# Patient Record
Sex: FEMALE | Race: WHITE | NOT HISPANIC OR LATINO | Employment: OTHER | URBAN - METROPOLITAN AREA
[De-identification: names, ages, dates, MRNs, and addresses within clinical notes are randomized per-mention and may not be internally consistent; named-entity substitution may affect disease eponyms.]

---

## 2017-02-21 LAB — HCV AB SER-ACNC: NEGATIVE

## 2018-02-21 ENCOUNTER — OFFICE VISIT (OUTPATIENT)
Dept: FAMILY MEDICINE CLINIC | Facility: CLINIC | Age: 63
End: 2018-02-21
Payer: MEDICARE

## 2018-02-21 VITALS
RESPIRATION RATE: 14 BRPM | SYSTOLIC BLOOD PRESSURE: 112 MMHG | TEMPERATURE: 98 F | HEIGHT: 61 IN | HEART RATE: 80 BPM | WEIGHT: 145.8 LBS | DIASTOLIC BLOOD PRESSURE: 76 MMHG | BODY MASS INDEX: 27.53 KG/M2

## 2018-02-21 DIAGNOSIS — Z12.39 SCREENING FOR BREAST CANCER: ICD-10-CM

## 2018-02-21 DIAGNOSIS — M51.36 DDD (DEGENERATIVE DISC DISEASE), LUMBAR: ICD-10-CM

## 2018-02-21 DIAGNOSIS — E53.8 B12 DEFICIENCY: Primary | ICD-10-CM

## 2018-02-21 DIAGNOSIS — I10 BENIGN ESSENTIAL HYPERTENSION: ICD-10-CM

## 2018-02-21 DIAGNOSIS — M48.02 CERVICAL STENOSIS OF SPINAL CANAL: ICD-10-CM

## 2018-02-21 DIAGNOSIS — D51.0 PERNICIOUS ANEMIA: ICD-10-CM

## 2018-02-21 DIAGNOSIS — E03.9 HYPOTHYROIDISM, UNSPECIFIED TYPE: ICD-10-CM

## 2018-02-21 DIAGNOSIS — J30.9 ALLERGIC RHINITIS, UNSPECIFIED CHRONICITY, UNSPECIFIED SEASONALITY, UNSPECIFIED TRIGGER: ICD-10-CM

## 2018-02-21 DIAGNOSIS — M51.9 DISC DISORDER OF LUMBAR REGION: ICD-10-CM

## 2018-02-21 PROCEDURE — 96372 THER/PROPH/DIAG INJ SC/IM: CPT | Performed by: FAMILY MEDICINE

## 2018-02-21 PROCEDURE — 99203 OFFICE O/P NEW LOW 30 MIN: CPT | Performed by: FAMILY MEDICINE

## 2018-02-21 RX ORDER — CLONAZEPAM 0.5 MG/1
TABLET ORAL
Refills: 2 | COMMUNITY
Start: 2018-02-12 | End: 2018-04-11 | Stop reason: SDUPTHER

## 2018-02-21 RX ORDER — TIZANIDINE 4 MG/1
TABLET ORAL
COMMUNITY
End: 2018-02-21 | Stop reason: SDUPTHER

## 2018-02-21 RX ORDER — SERTRALINE HYDROCHLORIDE 100 MG/1
200 TABLET, FILM COATED ORAL DAILY
COMMUNITY
End: 2018-08-21 | Stop reason: SDUPTHER

## 2018-02-21 RX ORDER — FLUTICASONE PROPIONATE 50 MCG
2 SPRAY, SUSPENSION (ML) NASAL DAILY
Qty: 16 G | Refills: 5 | Status: SHIPPED | OUTPATIENT
Start: 2018-02-21 | End: 2018-11-13 | Stop reason: SDUPTHER

## 2018-02-21 RX ORDER — LEVOTHYROXINE SODIUM 0.12 MG/1
1 TABLET ORAL DAILY
COMMUNITY
Start: 2013-04-23 | End: 2018-03-01 | Stop reason: SDUPTHER

## 2018-02-21 RX ORDER — DICYCLOMINE HYDROCHLORIDE 10 MG/1
CAPSULE ORAL
COMMUNITY
End: 2018-04-11 | Stop reason: SDUPTHER

## 2018-02-21 RX ORDER — CYANOCOBALAMIN 1000 UG/ML
1000 INJECTION INTRAMUSCULAR; SUBCUTANEOUS
Status: SHIPPED | OUTPATIENT
Start: 2018-02-21

## 2018-02-21 RX ORDER — OMEPRAZOLE 40 MG/1
40 CAPSULE, DELAYED RELEASE ORAL DAILY
Refills: 3 | COMMUNITY
Start: 2018-01-31 | End: 2018-04-11 | Stop reason: SDUPTHER

## 2018-02-21 RX ORDER — MONTELUKAST SODIUM 10 MG/1
TABLET ORAL
COMMUNITY
End: 2018-04-11 | Stop reason: ALTCHOICE

## 2018-02-21 RX ORDER — TIZANIDINE HYDROCHLORIDE 4 MG/1
CAPSULE, GELATIN COATED ORAL
Refills: 1 | COMMUNITY
Start: 2018-01-07 | End: 2018-10-10 | Stop reason: SDUPTHER

## 2018-02-21 RX ORDER — METAXALONE 800 MG/1
TABLET ORAL
COMMUNITY
End: 2018-10-10 | Stop reason: SDUPTHER

## 2018-02-21 RX ORDER — GABAPENTIN 600 MG/1
TABLET ORAL
COMMUNITY
End: 2018-07-11

## 2018-02-21 RX ORDER — AMLODIPINE BESYLATE 5 MG/1
5 TABLET ORAL DAILY
Refills: 3 | COMMUNITY
Start: 2018-01-17 | End: 2019-03-19 | Stop reason: SDUPTHER

## 2018-02-21 RX ORDER — FLUTICASONE PROPIONATE 50 MCG
SPRAY, SUSPENSION (ML) NASAL
COMMUNITY
End: 2018-02-21 | Stop reason: SDUPTHER

## 2018-02-21 RX ORDER — HYDROCODONE BITARTRATE AND ACETAMINOPHEN 5; 300 MG/1; MG/1
TABLET ORAL
COMMUNITY
End: 2018-02-21 | Stop reason: ALTCHOICE

## 2018-02-21 RX ORDER — PNV NO.95/FERROUS FUM/FOLIC AC 28MG-0.8MG
TABLET ORAL
COMMUNITY
End: 2018-07-11

## 2018-02-21 RX ORDER — LIDOCAINE 50 MG/G
PATCH TOPICAL
COMMUNITY
End: 2019-03-19 | Stop reason: SDUPTHER

## 2018-02-21 RX ORDER — SIMVASTATIN 20 MG
TABLET ORAL
COMMUNITY
End: 2018-04-11 | Stop reason: ALTCHOICE

## 2018-02-21 RX ORDER — OMEGA-3-ACID ETHYL ESTERS 1 G/1
2 CAPSULE, LIQUID FILLED ORAL DAILY
COMMUNITY
End: 2022-03-31

## 2018-02-21 RX ORDER — NEBIVOLOL 10 MG/1
20 TABLET ORAL DAILY
COMMUNITY
End: 2021-05-03 | Stop reason: SDUPTHER

## 2018-02-21 RX ORDER — HYDROCODONE BITARTRATE AND ACETAMINOPHEN 5; 325 MG/1; MG/1
1 TABLET ORAL EVERY 6 HOURS PRN
Qty: 30 TABLET | Refills: 0 | Status: SHIPPED | OUTPATIENT
Start: 2018-02-21 | End: 2018-04-11 | Stop reason: SDUPTHER

## 2018-02-21 RX ORDER — ATORVASTATIN CALCIUM 40 MG/1
40 TABLET, FILM COATED ORAL 3 TIMES WEEKLY
Refills: 3 | COMMUNITY
Start: 2018-01-31 | End: 2019-09-19 | Stop reason: SDUPTHER

## 2018-02-21 RX ADMIN — CYANOCOBALAMIN 1000 MCG: 1000 INJECTION INTRAMUSCULAR; SUBCUTANEOUS at 15:34

## 2018-02-21 NOTE — PROGRESS NOTES
FAMILY PRACTICE OFFICE VISIT       NAME: Blas Sibley  AGE: 60 y o  SEX: female       : 1955        MRN: [id number]    DATE: 2018  TIME: 9:54 PM    Assessment and Plan     Problem List Items Addressed This Visit     Benign essential hypertension    Hypothyroidism    Pernicious anemia    Cervical stenosis of spinal canal    Disc disorder of lumbar region      Other Visit Diagnoses     B12 deficiency    -  Primary    Relevant Medications    cyanocobalamin injection 1,000 mcg    DDD (degenerative disc disease), lumbar        Relevant Medications    HYDROcodone-acetaminophen (NORCO) 5-325 mg per tablet    Allergic rhinitis, unspecified chronicity, unspecified seasonality, unspecified trigger        Relevant Medications    fluticasone (FLONASE) 50 mcg/act nasal spray    Screening for breast cancer        Relevant Orders    Mammo screening bilateral w 3d & cad       Patient presents for initial exam   Will proceed with routine blood work  Prescription for 30 tablets of hydrocodone that patient uses sparingly as needed going to [place] pain med check is performed  Patient will continue daily medications for chronic medical conditions  Referral for mammography  She is up-to-date with colonoscopy  Will review bone density screening at next office visit  She is grieving death of significant other, symptoms of depression and anxiety are overall well controlled, she is surrounded by supportive family and friends and will contact me if her symptoms are changing at any time  Will schedule follow-up in 4 weeks with blood work in the interim  There are no Patient Instructions on file for this visit  Chief Complaint     Chief Complaint   Patient presents with    Annual Exam     Patient is here for an initial office visit  History of Present Illness     Patient presents to establish with our practice  Complex past medical and past surgical history reviewed  Medications updated    History of PSVT, she is under routine care of Cardiology, no chest pain, palpitations or dizziness  Recent appointment few weeks ago, lipid panel performed by Cardiology reviewed, excellent control, patient uses Lipitor 40 milligrams 3 days a week  She remains on Norvasc for hypertension  Patient is due for routine blood work   History of depression and anxiety  OnZoloft  200 mg po qd and Clonopin, chronic regimen, medications work well  Patient has been under ongoing stress within past 2 weeks as she has lost significant other due to sudden  heart attack  She is coping well, great family and friend support  She is mother of 2 adult sons, grandmother  chronic LBP, chronic neck pain,   DJD-  No connective tissue desease, patient is under care of [facility]  She is also suffering from chronic hip pain due to dysplasia  She uses scale ox in daytime as needed for muscle spasms, she uses tizanidine at nighttime she never max is both muscle relaxants together  She is requesting short-term prescription for Vicodin, she uses pain medications infrequently as needed only  [facility] search web site checked, patient's refills are appropriate, last prescription for oxycodone was prescribed bio a for 30 tablets and no refills  Patient states that Vicodin works better with less sedating side effects  Known  Osteoporosis - could tolerate oral Rx no recent bone density scan   Due for GYN exam a mammography    [facility] - DR Bouchra Cerda   History of pernicious anemia, patient is on vitamin B12 injections on a monthly basis  She remains on iron supplements as well  She is up-to-date with colonoscopy          Review of Systems   Review of Systems   Constitutional: Positive for fatigue  HENT: Negative  Eyes: Negative  Respiratory: Negative  Cardiovascular: Negative  Gastrointestinal: Negative  Endocrine: Negative  Genitourinary: Negative      Musculoskeletal: Positive for arthralgias, back pain, myalgias and neck pain  Allergic/Immunologic: Positive for environmental allergies  Neurological: Negative  Hematological: Negative  Psychiatric/Behavioral: Positive for dysphoric mood  The patient is nervous/anxious  Active Problem List     Patient Active Problem List   Diagnosis    Benign essential hypertension    Hypothyroidism    Lumbosacral radiculitis    Pernicious anemia    Cervical stenosis of spinal canal    Disc disorder of lumbar region       Past Medical History:  No past medical history on file  Past Surgical History:  No past surgical history on file  Family History:  No family history on file  Social History:  Social History     Social History    Marital status: /Civil Union     Spouse name: N/A    Number of children: N/A    Years of education: N/A     Occupational History    Not on file  Social History Main Topics    Smoking status: Never Smoker    Smokeless tobacco: Never Used    Alcohol use No    Drug use: No    Sexual activity: Not on file     Other Topics Concern    Not on file     Social History Narrative    No narrative on file     I have reviewed the patient's medical history in detail; there are no changes to the history as noted in the electronic medical record  Objective     Vitals:    02/21/18 0919   BP: 112/76   Pulse: 80   Resp: 14   Temp: 98 °F (36 7 °C)     Wt Readings from Last 3 Encounters:   02/21/18 66 1 kg (145 lb 12 8 oz)   10/22/13 63 kg (139 lb)       Physical Exam   Constitutional: She is oriented to person, place, and time  She appears well-developed and well-nourished  HENT:   Head: Normocephalic and atraumatic  Eyes: Conjunctivae are normal    Neck: Neck supple  Cardiovascular: Normal rate, regular rhythm and normal heart sounds  No murmur heard  Neurological: She is alert and oriented to person, place, and time  Psychiatric: She has a normal mood and affect   Judgment and thought content normal  Nursing note and vitals reviewed  Pertinent Laboratory/Diagnostic Studies:  No results found for: GLUCOSE, BUN, CREATININE, CALCIUM, NA, K, CO2, CL  No results found for: ALT, AST, GGT, ALKPHOS, BILITOT    No results found for: WBC, HGB, HCT, MCV, PLT    No results found for: TSH    No results found for: CHOL  No results found for: TRIG  No results found for: HDL  No results found for: LDLCALC  No results found for: HGBA1C    No results found for this or any previous visit      Orders Placed This Encounter   Procedures    Mammo screening bilateral w 3d & cad       ALLERGIES:  Allergies   Allergen Reactions    Anesthesia S-I-40  [Propofol]     Celecoxib     Cephalexin     Choline Magnesium Trisalicylate     Diclofenac     Fentanyl     Other     Sulfa Antibiotics     Zolpidem        Current Medications     Current Outpatient Prescriptions   Medication Sig Dispense Refill    amLODIPine (NORVASC) 5 mg tablet Take 5 mg by mouth daily  3    atorvastatin (LIPITOR) 40 mg tablet Take 40 mg by mouth every evening  3    Cholecalciferol ([employer]S VITAMIN D3) 1000 units capsule Take by mouth      clonazePAM (KlonoPIN) 0 5 mg tablet TAKE 1 TO 2 TABLETS EVERY EVENING AS NEEDED FOR ANXIETY  2    dicyclomine (BENTYL) 10 mg capsule Take by mouth      Ferrous Sulfate (IRON) 325 (65 Fe) MG TABS Take by mouth      fluticasone (FLONASE) 50 mcg/act nasal spray 2 sprays into each nostril daily 16 g 5    levothyroxine 125 mcg tablet Take 1 tablet by mouth daily      lidocaine (LIDODERM) 5 % Apply topically      metaxalone (SKELAXIN) 800 mg tablet Take by mouth      nebivolol (BYSTOLIC) 10 mg tablet Take by mouth      omega-3-acid ethyl esters (LOVAZA) 1 g capsule Take 2 g by mouth daily      omeprazole (PriLOSEC) 40 MG capsule Take 40 mg by mouth daily  3    sertraline (ZOLOFT) 100 mg tablet Take by mouth      TiZANidine (ZANAFLEX) 4 MG capsule TAKE ONE CAPSULE BY MOUTH 3 TIMES A DAY AS NEEDED FOR MUSCLE SPASMS  1  gabapentin (NEURONTIN) 600 MG tablet Take by mouth      HYDROcodone-acetaminophen (NORCO) 5-325 mg per tablet Take 1 tablet by mouth every 6 (six) hours as needed for pain (severe back/ neck  pain) Max Daily Amount: 4 tablets 30 tablet 0    montelukast (SINGULAIR) 10 mg tablet Take by mouth      simvastatin (ZOCOR) 20 mg tablet Take by mouth       Current Facility-Administered Medications   Medication Dose Route Frequency Provider Last Rate Last Dose    cyanocobalamin injection 1,000 mcg  1,000 mcg Intramuscular Q30 Days Dimas Peraza MD   1,000 mcg at 02/21/18 1534         [facility] [place]   There are no preventive care reminders to display for this patient  There is no immunization history on file for this patient      Dimas Peraza MD

## 2018-03-01 DIAGNOSIS — E03.9 HYPOTHYROIDISM, UNSPECIFIED TYPE: Primary | ICD-10-CM

## 2018-03-01 RX ORDER — LEVOTHYROXINE SODIUM 0.12 MG/1
125 TABLET ORAL DAILY
Qty: 30 TABLET | Refills: 3 | Status: SHIPPED | OUTPATIENT
Start: 2018-03-01 | End: 2018-06-09 | Stop reason: SDUPTHER

## 2018-03-28 ENCOUNTER — CLINICAL SUPPORT (OUTPATIENT)
Dept: FAMILY MEDICINE CLINIC | Facility: CLINIC | Age: 63
End: 2018-03-28
Payer: MEDICARE

## 2018-03-28 DIAGNOSIS — E53.8 B12 DEFICIENCY: Primary | ICD-10-CM

## 2018-03-28 PROCEDURE — 96372 THER/PROPH/DIAG INJ SC/IM: CPT | Performed by: FAMILY MEDICINE

## 2018-03-28 RX ORDER — CYANOCOBALAMIN 1000 UG/ML
1000 INJECTION INTRAMUSCULAR; SUBCUTANEOUS
Status: SHIPPED | OUTPATIENT
Start: 2018-03-28

## 2018-03-29 RX ADMIN — CYANOCOBALAMIN 1000 MCG: 1000 INJECTION INTRAMUSCULAR; SUBCUTANEOUS at 07:17

## 2018-04-11 ENCOUNTER — OFFICE VISIT (OUTPATIENT)
Dept: FAMILY MEDICINE CLINIC | Facility: CLINIC | Age: 63
End: 2018-04-11
Payer: MEDICARE

## 2018-04-11 VITALS
HEIGHT: 60 IN | BODY MASS INDEX: 28.27 KG/M2 | WEIGHT: 144 LBS | TEMPERATURE: 97.1 F | SYSTOLIC BLOOD PRESSURE: 110 MMHG | RESPIRATION RATE: 16 BRPM | DIASTOLIC BLOOD PRESSURE: 60 MMHG | HEART RATE: 74 BPM

## 2018-04-11 DIAGNOSIS — M48.02 CERVICAL STENOSIS OF SPINAL CANAL: ICD-10-CM

## 2018-04-11 DIAGNOSIS — Z12.11 SCREENING FOR COLON CANCER: ICD-10-CM

## 2018-04-11 DIAGNOSIS — K21.9 GASTROESOPHAGEAL REFLUX DISEASE WITHOUT ESOPHAGITIS: ICD-10-CM

## 2018-04-11 DIAGNOSIS — E03.9 HYPOTHYROIDISM, UNSPECIFIED TYPE: Primary | ICD-10-CM

## 2018-04-11 DIAGNOSIS — M51.36 DDD (DEGENERATIVE DISC DISEASE), LUMBAR: ICD-10-CM

## 2018-04-11 DIAGNOSIS — M81.0 OSTEOPOROSIS, UNSPECIFIED OSTEOPOROSIS TYPE, UNSPECIFIED PATHOLOGICAL FRACTURE PRESENCE: ICD-10-CM

## 2018-04-11 DIAGNOSIS — F41.9 ANXIETY: ICD-10-CM

## 2018-04-11 DIAGNOSIS — D51.0 PERNICIOUS ANEMIA: ICD-10-CM

## 2018-04-11 DIAGNOSIS — K58.0 IRRITABLE BOWEL SYNDROME WITH DIARRHEA: ICD-10-CM

## 2018-04-11 DIAGNOSIS — I10 BENIGN ESSENTIAL HYPERTENSION: ICD-10-CM

## 2018-04-11 DIAGNOSIS — Z00.00 HEALTHCARE MAINTENANCE: ICD-10-CM

## 2018-04-11 DIAGNOSIS — D50.8 IRON DEFICIENCY ANEMIA SECONDARY TO INADEQUATE DIETARY IRON INTAKE: ICD-10-CM

## 2018-04-11 PROBLEM — M81.8 OTHER OSTEOPOROSIS WITHOUT CURRENT PATHOLOGICAL FRACTURE: Status: ACTIVE | Noted: 2018-04-11

## 2018-04-11 PROCEDURE — 99214 OFFICE O/P EST MOD 30 MIN: CPT | Performed by: NURSE PRACTITIONER

## 2018-04-11 PROCEDURE — G0438 PPPS, INITIAL VISIT: HCPCS | Performed by: NURSE PRACTITIONER

## 2018-04-11 RX ORDER — HYDROCODONE BITARTRATE AND ACETAMINOPHEN 5; 325 MG/1; MG/1
1 TABLET ORAL EVERY 6 HOURS PRN
Qty: 30 TABLET | Refills: 0 | Status: SHIPPED | OUTPATIENT
Start: 2018-04-11 | End: 2018-06-04 | Stop reason: SDUPTHER

## 2018-04-11 RX ORDER — OMEPRAZOLE 40 MG/1
40 CAPSULE, DELAYED RELEASE ORAL DAILY
Qty: 30 CAPSULE | Refills: 5 | Status: SHIPPED | OUTPATIENT
Start: 2018-04-11 | End: 2018-07-11

## 2018-04-11 RX ORDER — DICYCLOMINE HYDROCHLORIDE 10 MG/1
10 CAPSULE ORAL
Qty: 120 CAPSULE | Refills: 5 | Status: SHIPPED | OUTPATIENT
Start: 2018-04-11 | End: 2018-10-13 | Stop reason: SDUPTHER

## 2018-04-11 RX ORDER — CLONAZEPAM 0.5 MG/1
0.5 TABLET ORAL
Qty: 30 TABLET | Refills: 2 | Status: SHIPPED | OUTPATIENT
Start: 2018-04-11 | End: 2018-05-25

## 2018-04-11 NOTE — PROGRESS NOTES
FAMILY PRACTICE OFFICE VISIT       NAME: Maulik June  AGE: 58 y o  SEX: female       : 1955        MRN: [id number]    DATE: 2018    Assessment and Plan     Problem List Items Addressed This Visit     Benign essential hypertension    Hypothyroidism - Primary    Pernicious anemia    Cervical stenosis of spinal canal    Gastroesophageal reflux disease without esophagitis    Relevant Medications    dicyclomine (BENTYL) 10 mg capsule    omeprazole (PriLOSEC) 40 MG capsule    Iron deficiency anemia secondary to inadequate dietary iron intake    Irritable bowel syndrome with diarrhea    Relevant Medications    dicyclomine (BENTYL) 10 mg capsule      Other Visit Diagnoses     DDD (degenerative disc disease), lumbar        Relevant Medications    HYDROcodone-acetaminophen (NORCO) 5-325 mg per tablet    Osteoporosis, unspecified osteoporosis type, unspecified pathological fracture presence        Relevant Orders    DXA bone density spine hip and pelvis    Anxiety        Relevant Medications    clonazePAM (KlonoPIN) 0 5 mg tablet    Screening for colon cancer        Relevant Orders    Ambulatory referral to Gastroenterology    Healthcare maintenance            1  Hypothyroidism, unspecified type  Levothyroxine 125 mcg daily  Due for TSH, has labs slips and will complete blood work as requested by Dr Urvashi Sauceda at last visit  2  Benign essential hypertension  Stable on Amlodipine 5 mg daily and Bystolic 10 mg daily  3  Pernicious anemia  Vitamin B12 injections monthly  Due for CBC  Has a slip, ordered by Dr Urvashi Sauceda at last visit  She will complete  4  Iron deficiency anemia secondary to inadequate dietary iron intake  Ferrous Sulfate 325 mg daily  Due for CBC, has order and she will complete  5  Cervical stenosis of spinal canal  Follows with Dr Bobbi Spurling at [place] [id number] and chiropractor, Dr Kiah Roach at [facility]  Stable on Gabapentin and Tizanidine 4 mg at bedtime daily   Skelaxin as needed during the day  She is aware not to take both of these medications at the same time  Requests a refill of Vicodin today, which she uses sparingly  [place] PDMP queried and appropriate  Prescription provided  Has used more vicodin over the last week than usual, due to an increase in left shoulder and arm pain  Chiropractic care is also helping with recent pain increase  6  DDD (degenerative disc disease), lumbar  See above  - HYDROcodone-acetaminophen (NORCO) 5-325 mg per tablet; Take 1 tablet by mouth every 6 (six) hours as needed for pain (severe back/ neck  pain) Max Daily Amount: 4 tablets  Dispense: 30 tablet; Refill: 0    7  Osteoporosis, unspecified osteoporosis type, unspecified pathological fracture presence  States she has a history of osteoporosis, ? Osteoporosis or osteopenia, will check Dexa scan  - DXA bone density spine hip and pelvis; Future    8  Irritable bowel syndrome with diarrhea  Stable on Dicyclomine  [facility] was [facility] with [facility], but has not seen him for quite some time, due to the stress of her 's illness and death 2 years ago  Renewed Dicylcomine today and have recommended she resume care with Dr Erinn Morton  - dicyclomine (BENTYL) 10 mg capsule; Take 1 capsule (10 mg total) by mouth 4 (four) times a day (before meals and at bedtime)  Dispense: 120 capsule; Refill: 5    9  Gastroesophageal reflux disease without esophagitis  Stable on Omeprazole  She has been taking this for many years  We discussed trial of weaning off this medication, due to long term side effects  She does however, sometimes feel regurgitation when bending over  Recommend she re-establish care with Dr Erinn Morton  - omeprazole (PriLOSEC) 40 MG capsule; Take 1 capsule (40 mg total) by mouth daily  Dispense: 30 capsule; Refill: 5    10  Anxiety  Stable on Sertraline 100 mg daily and Clonazepam 0 5 mg at bedtime  Clonazepam refilled today  [place] quer[place] and appropriate       - clonazePAM (KlonoPIN) 0 5 mg tablet; Take 1 tablet (0 5 mg total) by mouth daily at bedtime  Dispense: 30 tablet; Refill: 2    11  Screening for colon cancer  Up to date per patient  Will attempt to obtain copy of last colonoscopy  - Ambulatory referral to Gastroenterology; Future    12  Healthcare maintenance  Up to date with annual eye exam, Dr Lacho Santana  Colonoscopy up to date, Dr Willy Mckinney, [facility]  Mammogram and annual gyn exam due, she follows with Kentrell Willson CNM  Dexa-states she has a history of osteoporosis, but has not had a dexa scan for several years  Hepatitis C screening completed and negative  Zostavax completed at age 60  Complete blood work and follow up with Dr Hector Metzger in 3 months  Chief Complaint     Chief Complaint   Patient presents with    Follow-up    Medicare Wellness Visit     initial       History of Present Illness     Patient presents today for follow up visit    History of low back pain with lumbar DDD and neck pain due to cervical stenosis  She follows with chiropractor, Dr Jose Barfield at [facility] and Dr Melissa Tamayo at [facility]  She has been having increased left arm and shoulder pain over the last week and has been seeing Dr Jose Barfield weekly  She takes Tizanidine on a daily basis at bedtime  Uses Skelaxin on occasion during the day if needed  She is aware she cannot take both at the same time  Using Vicodin on an as needed basis sparingly  Requests refill of vicodin today  States she has about 4-6 pills left, but has been taking them more frequently over the past few days due to increased pain  She has a spinal cord stimulator in place that is not functioning  She does not wish to have it removed  Currently grieving  Lost her  2 years ago and has lost her significant other about 2 months ago  Anxiety and depression well controlled with Sertraline 100 mg daily, and Clonazepam 0 5 mg at bedtime        Health Maintenance:   Up to date with annual eye exam, Dr Nellie Min  Colonoscopy up to date, Dr Mireya Conrad, [facility]  Mammogram and annual gyn exam due, she follows with Areli Campos CNM  Dexa-states she has a history of osteoporosis, but has not had a dexa scan for several years  Hepatitis C screening negative  Zostavax completed at age 60  Seasonal allergic rhinitis well controlled with Flonase daily during spring and fall  Uses an OTC antihistamine as needed  Follows with cardiology for PSVT, HTN, and Hyperlipidemia, Dr Judah Reyes  IBS-D, taking Dicyclomine 2-3 times per day  GERD, stable with Prilosec, has taken Prilosec for many years  MITCH and pernicious anemia monthly B12 injections and Ferrous sulfate 325 mg daily  Review of Systems   Review of Systems   Constitutional: Negative for chills, fatigue, fever and unexpected weight change  HENT: Negative  Eyes: Negative  Respiratory: Negative for cough and shortness of breath  Cardiovascular: Negative for chest pain, palpitations and leg swelling  Gastrointestinal: Positive for diarrhea  Negative for abdominal pain, nausea and vomiting  Endocrine: Negative  Genitourinary: Negative for difficulty urinating  Musculoskeletal: Positive for arthralgias and back pain  Skin: Negative for rash  Neurological: Negative for dizziness, weakness and headaches  Hematological: Negative for adenopathy  Does not bruise/bleed easily  Psychiatric/Behavioral: Negative for suicidal ideas  The patient is nervous/anxious          Active Problem List     Patient Active Problem List   Diagnosis    Benign essential hypertension    Hypothyroidism    Lumbosacral radiculitis    Pernicious anemia    Cervical stenosis of spinal canal    Disc disorder of lumbar region    Other osteoporosis without current pathological fracture    Gastroesophageal reflux disease without esophagitis    Seasonal allergic rhinitis due to pollen    PSVT (paroxysmal supraventricular tachycardia) (HCC)    Iron deficiency anemia secondary to inadequate dietary iron intake    Irritable bowel syndrome with diarrhea       Past Medical History:  Past Medical History:   Diagnosis Date    Anxiety and depression     Bleeding ulcer     GERD (gastroesophageal reflux disease)     Spinal meningioma ([address] )        Past Surgical History:  Past Surgical History:   Procedure Laterality Date    SPINAL CORD STIMULATOR IMPLANT      SPINE SURGERY         Family History:  No family history on file  Social History:  Social History     Social History    Marital status:      Spouse name: N/A    Number of children: N/A    Years of education: N/A     Occupational History    Not on file  Social History Main Topics    Smoking status: Never Smoker    Smokeless tobacco: Never Used    Alcohol use No    Drug use: No    Sexual activity: Not on file     Other Topics Concern    Not on file     Social History Narrative        Retired    2 adult sons    Grandchildren       I have reviewed the patient's medical history in detail; there are no changes to the history as noted in the electronic medical record  Objective     Vitals:    04/11/18 1020   BP: 110/60   BP Location: Left leg   Patient Position: Sitting   Cuff Size: Standard   Pulse: 74   Resp: 16   Temp: (!) 97 1 °F (36 2 °C)   TempSrc: Tympanic   Weight: 65 3 kg (144 lb)   Height: 5' (1 524 m)     Wt Readings from Last 3 Encounters:   04/11/18 65 3 kg (144 lb)   02/21/18 66 1 kg (145 lb 12 8 oz)   10/22/13 63 kg (139 lb)       Physical Exam   Constitutional: She is oriented to person, place, and time  She appears well-developed and well-nourished  No distress  HENT:   Head: Normocephalic and atraumatic     Right Ear: Tympanic membrane and ear canal normal    Left Ear: Tympanic membrane and ear canal normal    Nose: Nose normal    Mouth/Throat: Oropharynx is clear and moist    Eyes: Conjunctivae and EOM are normal  Pupils are equal, round, and reactive to light  Neck: Normal range of motion  Neck supple  No thyromegaly present  No carotid bruit auscultated bilaterally   Cardiovascular: Normal rate and regular rhythm  No murmur heard  Pulmonary/Chest: Effort normal and breath sounds normal    Abdominal: Soft  Bowel sounds are normal  There is no tenderness  Musculoskeletal: Normal range of motion  She exhibits no edema  Lymphadenopathy:     She has no cervical adenopathy  Neurological: She is alert and oriented to person, place, and time  Skin: No rash noted  Psychiatric: She has a normal mood and affect  Nursing note and vitals reviewed          Orders Placed This Encounter   Procedures    DXA bone density spine hip and pelvis    Ambulatory referral to Gastroenterology       ALLERGIES:  Allergies   Allergen Reactions    Anesthesia S-I-40  [Propofol]     Celecoxib     Cephalexin     Choline Magnesium Trisalicylate     Diclofenac     Fentanyl     Other     Sulfa Antibiotics     Zolpidem        Current Medications     Current Outpatient Prescriptions   Medication Sig Dispense Refill    amLODIPine (NORVASC) 5 mg tablet Take 5 mg by mouth daily  3    atorvastatin (LIPITOR) 40 mg tablet Take 40 mg by mouth every evening  3    Calcium Carbonate-Vitamin D (CALCIUM CREAMIES PO) Take 600 mg by mouth daily      Cholecalciferol (CVS VITAMIN D3) 1000 units capsule Take by mouth      clonazePAM (KlonoPIN) 0 5 mg tablet Take 1 tablet (0 5 mg total) by mouth daily at bedtime 30 tablet 2    dicyclomine (BENTYL) 10 mg capsule Take 1 capsule (10 mg total) by mouth 4 (four) times a day (before meals and at bedtime) 120 capsule 5    Ferrous Sulfate (IRON) 325 (65 Fe) MG TABS Take by mouth      fluticasone (FLONASE) 50 mcg/act nasal spray 2 sprays into each nostril daily 16 g 5    HYDROcodone-acetaminophen (NORCO) 5-325 mg per tablet Take 1 tablet by mouth every 6 (six) hours as needed for pain (severe back/ neck  pain) Max Daily Amount: 4 tablets 30 tablet 0    levothyroxine 125 mcg tablet Take 1 tablet (125 mcg total) by mouth daily 30 tablet 3    lidocaine (LIDODERM) 5 % Apply topically      metaxalone (SKELAXIN) 800 mg tablet Take by mouth      nebivolol (BYSTOLIC) 10 mg tablet Take by mouth      omega-3-acid ethyl esters (LOVAZA) 1 g capsule Take 2 g by mouth daily      omeprazole (PriLOSEC) 40 MG capsule Take 1 capsule (40 mg total) by mouth daily 30 capsule 5    sertraline (ZOLOFT) 100 mg tablet Take by mouth      TiZANidine (ZANAFLEX) 4 MG capsule TAKE ONE CAPSULE BY MOUTH 3 TIMES A DAY AS NEEDED FOR MUSCLE SPASMS  1    gabapentin (NEURONTIN) 600 MG tablet Take by mouth       Current Facility-Administered Medications   Medication Dose Route Frequency Provider Last Rate Last Dose    cyanocobalamin injection 1,000 mcg  1,000 mcg Intramuscular Q30 Days Trinity Galvin MD   1,000 mcg at 02/21/18 1534    cyanocobalamin injection 1,000 mcg  1,000 mcg Intramuscular Q30 Days Trinity Galvin MD   1,000 mcg at 03/29/18 0717         Health Maintenance     Health Maintenance   Topic Date Due    HIV SCREENING  1955    Hepatitis C Screening  1955    COLONOSCOPY  1955    Depression Screening PHQ-9  04/09/1966    DTaP,Tdap,and Td Vaccines (2 - Td) 02/17/2018    INFLUENZA VACCINE  Completed     Immunization History   Administered Date(s) Administered    Tdap 02/17/2008    Zoster 04/29/2014       LAKEISHA Camarillo

## 2018-04-11 NOTE — PROGRESS NOTES
HPI:  Viki España is a 58 y o  female here for her Initial Wellness Visit  Patient Active Problem List   Diagnosis    Benign essential hypertension    Hypothyroidism    Lumbosacral radiculitis    Pernicious anemia    Cervical stenosis of spinal canal    Disc disorder of lumbar region    Other osteoporosis without current pathological fracture    Gastroesophageal reflux disease without esophagitis    Seasonal allergic rhinitis due to pollen    PSVT (paroxysmal supraventricular tachycardia) (HCC)    Iron deficiency anemia secondary to inadequate dietary iron intake    Irritable bowel syndrome with diarrhea     Past Medical History:   Diagnosis Date    Anxiety and depression     Bleeding ulcer     GERD (gastroesophageal reflux disease)     Spinal meningioma ([address] )      Past Surgical History:   Procedure Laterality Date    SPINAL CORD STIMULATOR IMPLANT      SPINE SURGERY       No family history on file    History   Smoking Status    Never Smoker   Smokeless Tobacco    Never Used     History   Alcohol Use No      History   Drug Use No     /60 (BP Location: Left leg, Patient Position: Sitting, Cuff Size: Standard)   Pulse 74   Temp (!) 97 1 °F (36 2 °C) (Tympanic)   Resp 16   Ht 5' (1 524 m)   Wt 65 3 kg (144 lb)   BMI 28 12 kg/m²       Current Outpatient Prescriptions   Medication Sig Dispense Refill    amLODIPine (NORVASC) 5 mg tablet Take 5 mg by mouth daily  3    atorvastatin (LIPITOR) 40 mg tablet Take 40 mg by mouth 3 (three) times a week    3    Calcium Carbonate-Vitamin D (CALCIUM CREAMIES PO) Take 600 mg by mouth daily      Cholecalciferol ([facility] VITAMIN D3) 1000 units capsule Take by mouth      clonazePAM (KlonoPIN) 0 5 mg tablet Take 1 tablet (0 5 mg total) by mouth daily at bedtime 30 tablet 2    dicyclomine (BENTYL) 10 mg capsule Take 1 capsule (10 mg total) by mouth 4 (four) times a day (before meals and at bedtime) 120 capsule 5    Ferrous Sulfate (IRON) 325 (65 Fe) MG TABS Take by mouth      fluticasone (FLONASE) 50 mcg/act nasal spray 2 sprays into each nostril daily 16 g 5    HYDROcodone-acetaminophen (NORCO) 5-325 mg per tablet Take 1 tablet by mouth every 6 (six) hours as needed for pain (severe back/ neck  pain) Max Daily Amount: 4 tablets 30 tablet 0    levothyroxine 125 mcg tablet Take 1 tablet (125 mcg total) by mouth daily 30 tablet 3    lidocaine (LIDODERM) 5 % Apply topically      metaxalone (SKELAXIN) 800 mg tablet Take by mouth      nebivolol (BYSTOLIC) 10 mg tablet Take by mouth      omega-3-acid ethyl esters (LOVAZA) 1 g capsule Take 2 g by mouth daily      omeprazole (PriLOSEC) 40 MG capsule Take 1 capsule (40 mg total) by mouth daily 30 capsule 5    sertraline (ZOLOFT) 100 mg tablet Take by mouth      TiZANidine (ZANAFLEX) 4 MG capsule TAKE ONE CAPSULE BY MOUTH 3 TIMES A DAY AS NEEDED FOR MUSCLE SPASMS  1    gabapentin (NEURONTIN) 600 MG tablet Take by mouth       Current Facility-Administered Medications   Medication Dose Route Frequency Provider Last Rate Last Dose    cyanocobalamin injection 1,000 mcg  1,000 mcg Intramuscular Q30 Days Gregoria Perez MD   1,000 mcg at 02/21/18 1534    cyanocobalamin injection 1,000 mcg  1,000 mcg Intramuscular Q30 Days Gregoria Perez MD   1,000 mcg at 03/29/18 5991     Allergies   Allergen Reactions    Anesthesia S-I-40  [Propofol]     Celecoxib     Cephalexin     Choline Magnesium Trisalicylate     Diclofenac     Fentanyl     Other     Sulfa Antibiotics     Zolpidem      Immunization History   Administered Date(s) Administered    Tdap 02/17/2008    Zoster 04/29/2014       Patient Care Team:  Gregoria Perez MD as PCP - General (Family Medicine)    Medicare Screening Tests and Risk Assessments:  AWV Clinical     ISAR:   Previous hospitalizations?:  No       Once in a Lifetime Medicare Screening:   EKG performed?:  No    AAA screening performed? (if performed, please add date to Health Maintenance):  No       Medicare Screening Tests and Risk Assessment:   AAA Risk Assessment    None Indicated:  Yes    Osteoporosis Risk Assessment     Female:  Yes   :  Yes :  No   Age over 48:  Yes Low body weight (<127lbs):  No   Tobacco use:  No Alcohol use:  Yes   Low calcium diet:  No PMHX of fractures:  Yes   FHX of fractures:  Yes    HIV Risk Assessment    None indicated:  Yes        Drug and Alcohol Use:   Tobacco use    Cigarettes:  never smoker    Smokeless:  never used smokeless tobacco    Tobacco use duration    Tobacco Cessation Readiness    Alcohol use    Alcohol use:  rare use    Concern about alcohol use:  No    Alcohol Treatment Readiness   Illicit Drug Use    Drug use:  never    Drug type:  no sedative use       Diet & Exercise:   Diet   What is your diet?:  Low Saturated Fat, Low Carb, No Added Salt, Limited junk food   How many servings a day of the following:   Fruits and Vegetables:  3-4 Meat:  1-2   Whole Grains:  2 Simple Carbs:  2   Dairy:  1 Soda:  1   Coffee:  1 Tea:  1   Exercise    Do you currently exercise?:  yes    Frequency:  daily    Type of exercise:  walking   [facility]        Cognitive Impairment Screening:   Depression screening preformed:  Yes     PHQ-9 Depression scale score:  5   Depression screening results:  mild to moderate symptoms   Cognitive Impairment Screening    Do you have difficulty learning or retaining new information?:  No        Functional Ability/Level of Safety:   Hearing    Hearing difficulties:  Yes Bilateral:  slightly decreased   Hearing aid:  No    Hearing Impairment Assessment    Hearing status:  Hearing loss in left ear, Hearing loss in right ear   Current Activities    Status:  unlimited ADL's, unlimited IADL's, unlimited social activities, unlimited driving   Help needed with the folllowing:    Using the phone:  No Transportation:  No   Shopping:  No Preparing Meals:  No   Doing Housework:  No Doing Laundry:  No   Managing Medications:  No Managing Money:  No   ADL    Feeding:  Independant   Oral hygiene and Facial grooming:  Independant   Bathing:  Independant   Upper Body Dressing:  Independant   Lower Body Dressing:  Independant   Toileting:  Independant   Bed Mobility:  Additional time   Fall Risk   Have you fallen in the last 12 months?:  Yes Are you unsteady on your feet?:  Yes   How many times?:  2 Are you taking any medications that may cause fatigue or dizziness?:  Yes   Do you have any chronic conditions that may contribute to a fall?:  Neuopathy Do you rush to the bathroom potentially risking a fall?:  No   Injury History   Polypharmacy:  Yes Antidepressant Use:  Yes   Sedative Use:  No Antihypertensive Use:  Yes   Previous Fall:  Yes Alcohol Use:  Yes   Deconditioning:  No Visual Impairment:  No   Cogitive Impairment:  No Mmobility Impairment:  Yes   Postural Hypotension:  No Urinary Incontinence:  Yes       Home Safety:   Are there hazards in your environment?:  No   Home Safety Risk Factors   Unfamilar with surroundings:  No Uneven floors:  No   Stairs or handrail saftey risk:  No Loose rugs:  No   Household clutter:  No Poor household lighting:  No   No grab bars in bathroom:  No Further evaluation needed:  No       Advanced Directives:   Advanced Directives    Living Will:  No    Advanced directive:  No    Patient's End of Life Decisions    End of life decisions comments:  Papers given and explained       Urinary Incontinence:   Do you have urinary incontinence?:  Yes        Glaucoma:            Provider Screening     Preventative Screening/Counseling:   Cardiovascular Screening/Counseling:   (Labs Q5 years, EKG optional one-time)   General:  Screening Current Counseling:  Healthy Diet, Improve Exercise Tolerance Due for: Lab Panel/Analytes:  Lipid Panel         Diabetes Screening/Counseling:   (2 tests/year if Pre-Diabetes or 1 test/year if no Diabetes)   General:  Risks and Benefits Discussed Counseling:  Healthy Diet, Healthy Weight, Improve Physical Activity Due for: Labs:  Blood Glucose         Colorectal Cancer Screening/Counseling:   (FOBT Q1 yr; Flex Sig Q4 yrs or Q10 yrs after Screening Colonoscopy; Screening Colonoscpy Q2 yrs High Risk or Q10 yrs Low Risk; Barium Enema Q2 yrs High Risk or Q4 yrs Low Risk)   General:  Screening Current           Prostate Cancer Screening/Counseling:   (Annual)          Breast Cancer Screening/Counseling:   (Baseline Age 28 - 43; Annual Age 36+)   General:  Risks and Benefits Discussed Due for: Studies:  mammogram         Cervical Cancer Screening/Counseling:   (Annual for High Risk or Childbearing Age with Abnormal Pap in Last 3 yrs; Every 2 all others)   General:  Risks and Benefits Discussed Due for: Studies:  Cervical Pap Smear          Osteoporosis Screening/Counseling:   (Every 2 Yrs if at risk or more if medically necessary)   General:  Risks and Benefits Discussed  Due for: Studies:  DXA Axial         AAA Screening/Counseling:   (Once per Lifetime with risk factors)    General:  Screening Not Indicated           Glaucoma Screening/Counseling:   (Annual)   General:  Screening Current          HIV Screening/Counseling:   (Voluntary; Once annually for high risk OR 3 times for Pregnancy at diagnosis of IUP; 3rd trimester; and at Labor         Hepatitis C Screening:             Immunizations:   Zostavax (Medicare D Coverage, Pt >64 yo):  Zostavax Vaccine UTD       Other Preventative Couseling (Non-Medicare Wellness Visit Required):       Referrals (Non-Medicare Wellness Visit Required):       Medical Equipment/Suppliers:           Physical Exam :MMSE=30  Physical Exam   Constitutional: She is oriented to person, place, and time  She appears well-developed and well-nourished  No distress  HENT:   Head: Normocephalic and atraumatic     Right Ear: Tympanic membrane and ear canal normal    Left Ear: Tympanic membrane and ear canal normal    Nose: Nose normal    Mouth/Throat: Uvula is midline and oropharynx is clear and moist    Eyes: Conjunctivae and EOM are normal  Pupils are equal, round, and reactive to light  Neck: Normal range of motion  Neck supple  No thyromegaly present  Cardiovascular: Normal rate, regular rhythm and normal heart sounds  Pulmonary/Chest: Effort normal and breath sounds normal    Abdominal: Soft  Bowel sounds are normal  There is no tenderness  Musculoskeletal: She exhibits no edema  Lymphadenopathy:     She has no cervical adenopathy  Neurological: She is alert and oriented to person, place, and time  Skin: Skin is warm and dry  Capillary refill takes less than 2 seconds  No rash noted  Psychiatric: She has a normal mood and affect  Nursing note and vitals reviewed  Reviewed Updated [facility] Prior Wellness Visits:   Last Medicare wellness visit information was reviewed, patient interviewed , no change since last AWVno  Last Medicare wellness visit information was reviewed, patient interviewed and updates made to the record today no    Assessment and Plan:  1  Hypothyroidism, unspecified type     2  Benign essential hypertension     3  Pernicious anemia     4  Iron deficiency anemia secondary to inadequate dietary iron intake     5  Cervical stenosis of spinal canal     6  DDD (degenerative disc disease), lumbar  HYDROcodone-acetaminophen (NORCO) 5-325 mg per tablet   7  Osteoporosis, unspecified osteoporosis type, unspecified pathological fracture presence  DXA bone density spine hip and pelvis   8  Irritable bowel syndrome with diarrhea  dicyclomine (BENTYL) 10 mg capsule   9  Gastroesophageal reflux disease without esophagitis  omeprazole (PriLOSEC) 40 MG capsule   10  Anxiety  clonazePAM (KlonoPIN) 0 5 mg tablet   11  Screening for colon cancer  Ambulatory referral to Gastroenterology   12   Healthcare maintenance       Up to date with annual eye exam, Dr Bo Hardwick  Colonoscopy up to date, Dr Kadie Garnica, [facility]  Mammogram and annual gyn exam due, she follows with Andrew Nuñez CNM  Dexa-states she has a history of osteoporosis, but has not had a dexa scan for several years  Dexa ordered  Hepatitis C screening completed and negative  Zostavax completed at age 60         Health Maintenance Due   Topic Date Due    HIV SCREENING  1955    Hepatitis C Screening  1955    COLONOSCOPY  1955    Depression Screening PHQ-9  04/09/1966    DTaP,Tdap,and Td Vaccines (2 - Td) 02/17/2018

## 2018-04-12 PROBLEM — K58.0 IRRITABLE BOWEL SYNDROME WITH DIARRHEA: Status: ACTIVE | Noted: 2018-04-12

## 2018-04-12 PROBLEM — K21.9 GASTROESOPHAGEAL REFLUX DISEASE WITHOUT ESOPHAGITIS: Status: ACTIVE | Noted: 2018-04-12

## 2018-04-12 PROBLEM — I47.1 PSVT (PAROXYSMAL SUPRAVENTRICULAR TACHYCARDIA) (HCC): Status: ACTIVE | Noted: 2018-04-12

## 2018-04-12 PROBLEM — J30.1 SEASONAL ALLERGIC RHINITIS DUE TO POLLEN: Status: ACTIVE | Noted: 2018-04-12

## 2018-04-12 PROBLEM — D50.8 IRON DEFICIENCY ANEMIA SECONDARY TO INADEQUATE DIETARY IRON INTAKE: Status: ACTIVE | Noted: 2018-04-12

## 2018-04-12 PROBLEM — I47.10 PSVT (PAROXYSMAL SUPRAVENTRICULAR TACHYCARDIA): Status: ACTIVE | Noted: 2018-04-12

## 2018-04-30 ENCOUNTER — APPOINTMENT (OUTPATIENT)
Dept: LAB | Facility: CLINIC | Age: 63
End: 2018-04-30
Payer: MEDICARE

## 2018-04-30 ENCOUNTER — CLINICAL SUPPORT (OUTPATIENT)
Dept: FAMILY MEDICINE CLINIC | Facility: CLINIC | Age: 63
End: 2018-04-30
Payer: MEDICARE

## 2018-04-30 DIAGNOSIS — I10 BENIGN ESSENTIAL HYPERTENSION: ICD-10-CM

## 2018-04-30 DIAGNOSIS — M51.9 DISC DISORDER OF LUMBAR REGION: ICD-10-CM

## 2018-04-30 DIAGNOSIS — E53.8 B12 DEFICIENCY: Primary | ICD-10-CM

## 2018-04-30 DIAGNOSIS — E03.9 HYPOTHYROIDISM, UNSPECIFIED TYPE: ICD-10-CM

## 2018-04-30 DIAGNOSIS — D51.0 PERNICIOUS ANEMIA: ICD-10-CM

## 2018-04-30 LAB
25(OH)D3 SERPL-MCNC: 23.1 NG/ML (ref 30–100)
ALBUMIN SERPL BCP-MCNC: 4.4 G/DL (ref 3.5–5)
ALP SERPL-CCNC: 119 U/L (ref 46–116)
ALT SERPL W P-5'-P-CCNC: 40 U/L (ref 12–78)
ANION GAP SERPL CALCULATED.3IONS-SCNC: 6 MMOL/L (ref 4–13)
AST SERPL W P-5'-P-CCNC: 31 U/L (ref 5–45)
BASOPHILS # BLD AUTO: 0.02 THOUSANDS/ΜL (ref 0–0.1)
BASOPHILS NFR BLD AUTO: 0 % (ref 0–1)
BILIRUB SERPL-MCNC: 0.6 MG/DL (ref 0.2–1)
BUN SERPL-MCNC: 22 MG/DL (ref 5–25)
CALCIUM SERPL-MCNC: 9.6 MG/DL (ref 8.3–10.1)
CHLORIDE SERPL-SCNC: 101 MMOL/L (ref 100–108)
CO2 SERPL-SCNC: 31 MMOL/L (ref 21–32)
CREAT SERPL-MCNC: 0.74 MG/DL (ref 0.6–1.3)
EOSINOPHIL # BLD AUTO: 0.14 THOUSAND/ΜL (ref 0–0.61)
EOSINOPHIL NFR BLD AUTO: 2 % (ref 0–6)
ERYTHROCYTE [DISTWIDTH] IN BLOOD BY AUTOMATED COUNT: 13.2 % (ref 11.6–15.1)
GFR SERPL CREATININE-BSD FRML MDRD: 86 ML/MIN/1.73SQ M
GLUCOSE SERPL-MCNC: 100 MG/DL (ref 65–140)
HCT VFR BLD AUTO: 42.7 % (ref 34.8–46.1)
HGB BLD-MCNC: 13.9 G/DL (ref 11.5–15.4)
LYMPHOCYTES # BLD AUTO: 1.91 THOUSANDS/ΜL (ref 0.6–4.47)
LYMPHOCYTES NFR BLD AUTO: 24 % (ref 14–44)
MCH RBC QN AUTO: 30.5 PG (ref 26.8–34.3)
MCHC RBC AUTO-ENTMCNC: 32.6 G/DL (ref 31.4–37.4)
MCV RBC AUTO: 94 FL (ref 82–98)
MONOCYTES # BLD AUTO: 0.49 THOUSAND/ΜL (ref 0.17–1.22)
MONOCYTES NFR BLD AUTO: 6 % (ref 4–12)
NEUTROPHILS # BLD AUTO: 5.32 THOUSANDS/ΜL (ref 1.85–7.62)
NEUTS SEG NFR BLD AUTO: 68 % (ref 43–75)
NRBC BLD AUTO-RTO: 0 /100 WBCS
PLATELET # BLD AUTO: 244 THOUSANDS/UL (ref 149–390)
PMV BLD AUTO: 9.7 FL (ref 8.9–12.7)
POTASSIUM SERPL-SCNC: 4.3 MMOL/L (ref 3.5–5.3)
PROT SERPL-MCNC: 8.9 G/DL (ref 6.4–8.2)
RBC # BLD AUTO: 4.56 MILLION/UL (ref 3.81–5.12)
SODIUM SERPL-SCNC: 138 MMOL/L (ref 136–145)
TSH SERPL DL<=0.05 MIU/L-ACNC: 0.42 UIU/ML (ref 0.36–3.74)
VIT B12 SERPL-MCNC: >6000 PG/ML (ref 100–900)
WBC # BLD AUTO: 7.9 THOUSAND/UL (ref 4.31–10.16)

## 2018-04-30 PROCEDURE — 85025 COMPLETE CBC W/AUTO DIFF WBC: CPT

## 2018-04-30 PROCEDURE — 96372 THER/PROPH/DIAG INJ SC/IM: CPT | Performed by: FAMILY MEDICINE

## 2018-04-30 PROCEDURE — 82607 VITAMIN B-12: CPT

## 2018-04-30 PROCEDURE — 80053 COMPREHEN METABOLIC PANEL: CPT

## 2018-04-30 PROCEDURE — 82306 VITAMIN D 25 HYDROXY: CPT

## 2018-04-30 PROCEDURE — 84443 ASSAY THYROID STIM HORMONE: CPT

## 2018-04-30 PROCEDURE — 36415 COLL VENOUS BLD VENIPUNCTURE: CPT

## 2018-04-30 RX ORDER — CYANOCOBALAMIN 1000 UG/ML
1000 INJECTION INTRAMUSCULAR; SUBCUTANEOUS
Status: SHIPPED | OUTPATIENT
Start: 2018-04-30

## 2018-04-30 RX ADMIN — CYANOCOBALAMIN 1000 MCG: 1000 INJECTION INTRAMUSCULAR; SUBCUTANEOUS at 17:37

## 2018-05-08 ENCOUNTER — TELEPHONE (OUTPATIENT)
Dept: FAMILY MEDICINE CLINIC | Facility: CLINIC | Age: 63
End: 2018-05-08

## 2018-05-08 DIAGNOSIS — R74.8 ELEVATED ALKALINE PHOSPHATASE LEVEL: Primary | ICD-10-CM

## 2018-05-08 NOTE — TELEPHONE ENCOUNTER
Patient is aware of MD instructions  Patient is taking Vitamin D3 2000 units daily  FYI  Patient states that she switched to Zantac 150mg daily for acid reflux and is doing much better

## 2018-05-08 NOTE — TELEPHONE ENCOUNTER
Please advise patient to increase dose of vitamin D3 from 2000 units once a day to 4000 units daily    Thank you

## 2018-05-08 NOTE — TELEPHONE ENCOUNTER
----- Message from Terry Tom MD sent at 5/8/2018  4:02 PM EDT -----  Please contact patient  I received her blood work  Was all normal aside from minimal elevation of 1 of liver function tests and decreased level of vitamin-D  Her vitamin-D level is 23, optimal is over 40  Please find out what's her actual current dose of vitamin-D so I can make adjustments  Patient should repeat blood work including liver function test in early July prior to her next office visit with me    Thank you

## 2018-05-23 ENCOUNTER — TELEPHONE (OUTPATIENT)
Dept: FAMILY MEDICINE CLINIC | Facility: CLINIC | Age: 63
End: 2018-05-23

## 2018-05-23 DIAGNOSIS — F41.1 GAD (GENERALIZED ANXIETY DISORDER): Primary | ICD-10-CM

## 2018-05-23 NOTE — TELEPHONE ENCOUNTER
Patient called stating that her [facility] not [facility] Clonazepam  5 mg but they do have 1mg  Can you change script to state take 1/2 tablet at night?   Please call and advise

## 2018-05-25 PROBLEM — F41.1 GAD (GENERALIZED ANXIETY DISORDER): Status: ACTIVE | Noted: 2018-05-25

## 2018-05-25 RX ORDER — CLONAZEPAM 1 MG/1
0.5 TABLET ORAL DAILY
Qty: 15 TABLET | Refills: 2 | Status: SHIPPED | OUTPATIENT
Start: 2018-05-25 | End: 2018-08-21 | Stop reason: SDUPTHER

## 2018-05-25 NOTE — TELEPHONE ENCOUNTER
I will send new prescription for Klonopin 1 mg tablets to be used as a half a tablet once a day    Thank you

## 2018-06-04 DIAGNOSIS — M51.36 DDD (DEGENERATIVE DISC DISEASE), LUMBAR: ICD-10-CM

## 2018-06-05 ENCOUNTER — CLINICAL SUPPORT (OUTPATIENT)
Dept: FAMILY MEDICINE CLINIC | Facility: CLINIC | Age: 63
End: 2018-06-05
Payer: MEDICARE

## 2018-06-05 DIAGNOSIS — E53.8 VITAMIN B12 DEFICIENCY: Primary | ICD-10-CM

## 2018-06-05 PROCEDURE — 96372 THER/PROPH/DIAG INJ SC/IM: CPT | Performed by: FAMILY MEDICINE

## 2018-06-05 RX ORDER — HYDROCODONE BITARTRATE AND ACETAMINOPHEN 5; 325 MG/1; MG/1
1 TABLET ORAL EVERY 6 HOURS PRN
Qty: 30 TABLET | Refills: 0 | Status: SHIPPED | OUTPATIENT
Start: 2018-06-05 | End: 2018-07-11 | Stop reason: SDUPTHER

## 2018-06-05 RX ORDER — CYANOCOBALAMIN 1000 UG/ML
1000 INJECTION INTRAMUSCULAR; SUBCUTANEOUS ONCE
Status: COMPLETED | OUTPATIENT
Start: 2018-06-05 | End: 2018-06-05

## 2018-06-05 RX ADMIN — CYANOCOBALAMIN 1000 MCG: 1000 INJECTION INTRAMUSCULAR; SUBCUTANEOUS at 15:47

## 2018-06-09 DIAGNOSIS — E03.9 HYPOTHYROIDISM, UNSPECIFIED TYPE: ICD-10-CM

## 2018-06-09 RX ORDER — LEVOTHYROXINE SODIUM 0.12 MG/1
125 TABLET ORAL DAILY
Qty: 30 TABLET | Refills: 2 | Status: SHIPPED | OUTPATIENT
Start: 2018-06-09 | End: 2018-09-07 | Stop reason: SDUPTHER

## 2018-07-11 ENCOUNTER — OFFICE VISIT (OUTPATIENT)
Dept: FAMILY MEDICINE CLINIC | Facility: CLINIC | Age: 63
End: 2018-07-11
Payer: MEDICARE

## 2018-07-11 ENCOUNTER — APPOINTMENT (OUTPATIENT)
Dept: LAB | Facility: CLINIC | Age: 63
End: 2018-07-11
Payer: MEDICARE

## 2018-07-11 VITALS
DIASTOLIC BLOOD PRESSURE: 60 MMHG | BODY MASS INDEX: 28.7 KG/M2 | WEIGHT: 146.2 LBS | TEMPERATURE: 97.2 F | HEIGHT: 60 IN | HEART RATE: 70 BPM | SYSTOLIC BLOOD PRESSURE: 102 MMHG

## 2018-07-11 DIAGNOSIS — M51.36 DDD (DEGENERATIVE DISC DISEASE), LUMBAR: ICD-10-CM

## 2018-07-11 DIAGNOSIS — R74.8 ELEVATED ALKALINE PHOSPHATASE LEVEL: ICD-10-CM

## 2018-07-11 DIAGNOSIS — I10 BENIGN ESSENTIAL HYPERTENSION: ICD-10-CM

## 2018-07-11 DIAGNOSIS — E78.2 MIXED HYPERLIPIDEMIA: ICD-10-CM

## 2018-07-11 DIAGNOSIS — K21.9 GASTROESOPHAGEAL REFLUX DISEASE WITHOUT ESOPHAGITIS: ICD-10-CM

## 2018-07-11 DIAGNOSIS — E53.8 VITAMIN B 12 DEFICIENCY: ICD-10-CM

## 2018-07-11 DIAGNOSIS — K58.0 IRRITABLE BOWEL SYNDROME WITH DIARRHEA: ICD-10-CM

## 2018-07-11 DIAGNOSIS — D51.0 PERNICIOUS ANEMIA: ICD-10-CM

## 2018-07-11 DIAGNOSIS — E03.9 HYPOTHYROIDISM, UNSPECIFIED TYPE: Primary | ICD-10-CM

## 2018-07-11 DIAGNOSIS — M51.9 DISC DISORDER OF LUMBAR REGION: ICD-10-CM

## 2018-07-11 DIAGNOSIS — F41.1 GAD (GENERALIZED ANXIETY DISORDER): ICD-10-CM

## 2018-07-11 LAB
25(OH)D3 SERPL-MCNC: 31 NG/ML (ref 30–100)
ALBUMIN SERPL BCP-MCNC: 4.1 G/DL (ref 3.5–5)
ALP SERPL-CCNC: 118 U/L (ref 46–116)
ALT SERPL W P-5'-P-CCNC: 34 U/L (ref 12–78)
AST SERPL W P-5'-P-CCNC: 27 U/L (ref 5–45)
BILIRUB DIRECT SERPL-MCNC: 0.09 MG/DL (ref 0–0.2)
BILIRUB SERPL-MCNC: 0.55 MG/DL (ref 0.2–1)
CHOLEST SERPL-MCNC: 189 MG/DL (ref 50–200)
HDLC SERPL-MCNC: 54 MG/DL (ref 40–60)
LDLC SERPL CALC-MCNC: 108 MG/DL (ref 0–100)
NONHDLC SERPL-MCNC: 135 MG/DL
PROT SERPL-MCNC: 8.4 G/DL (ref 6.4–8.2)
TRIGL SERPL-MCNC: 135 MG/DL

## 2018-07-11 PROCEDURE — 80061 LIPID PANEL: CPT

## 2018-07-11 PROCEDURE — 36415 COLL VENOUS BLD VENIPUNCTURE: CPT

## 2018-07-11 PROCEDURE — 82306 VITAMIN D 25 HYDROXY: CPT

## 2018-07-11 PROCEDURE — 80076 HEPATIC FUNCTION PANEL: CPT

## 2018-07-11 PROCEDURE — 99214 OFFICE O/P EST MOD 30 MIN: CPT | Performed by: FAMILY MEDICINE

## 2018-07-11 RX ORDER — OMEPRAZOLE 20 MG/1
CAPSULE, DELAYED RELEASE ORAL
COMMUNITY
End: 2018-07-11

## 2018-07-11 RX ORDER — FERROUS SULFATE 325(65) MG
TABLET ORAL
COMMUNITY

## 2018-07-11 RX ORDER — DOXYCYCLINE HYCLATE 100 MG
TABLET ORAL
COMMUNITY
End: 2019-05-09

## 2018-07-11 RX ORDER — BIOTIN 1 MG
TABLET ORAL
COMMUNITY
End: 2018-07-11

## 2018-07-11 RX ORDER — HYDROCODONE BITARTRATE AND ACETAMINOPHEN 5; 325 MG/1; MG/1
1 TABLET ORAL EVERY 6 HOURS PRN
Qty: 30 TABLET | Refills: 0 | Status: SHIPPED | OUTPATIENT
Start: 2018-07-11 | End: 2018-08-21 | Stop reason: SDUPTHER

## 2018-07-11 RX ORDER — CYANOCOBALAMIN 1000 UG/ML
1000 INJECTION INTRAMUSCULAR; SUBCUTANEOUS
Status: SHIPPED | OUTPATIENT
Start: 2018-07-11 | End: 2019-07-06

## 2018-07-11 RX ADMIN — CYANOCOBALAMIN 1000 MCG: 1000 INJECTION INTRAMUSCULAR; SUBCUTANEOUS at 12:20

## 2018-07-11 NOTE — PROGRESS NOTES
FAMILY PRACTICE OFFICE VISIT       NAME: Ivy Nayak  AGE: 58 y o  SEX: female       : 1955        MRN: [id number]    DATE: 2018  TIME: 5:54 AM    Assessment and Plan     Problem List Items Addressed This Visit     Benign essential hypertension    Hypothyroidism - Primary    Pernicious anemia    Disc disorder of lumbar region    Gastroesophageal reflux disease without esophagitis    Irritable bowel syndrome with diarrhea    Elevated alkaline phosphatase level    Relevant Orders    Vitamin D 25 hydroxy (Completed)    KERON (generalized anxiety disorder)    Mixed hyperlipidemia    Relevant Orders    Lipid panel (Completed)      Other Visit Diagnoses     DDD (degenerative disc disease), lumbar        Relevant Medications    HYDROcodone-acetaminophen (NORCO) 5-325 mg per tablet    Vitamin B 12 deficiency        Relevant Medications    cyanocobalamin injection 1,000 mcg       Follow-up chronic medical conditions  Hypertension  Well controlled, continue Bystolic and Norvasc  Hyperlipidemia:  Continue Lipitor  Patient remains under care of Cardiology at [facility] due to remote history of PSVT she denies symptoms of chest pain or palpitations  Hypothyroidism:  Continue Synthroid  Chronic significant osteoarthritis  Lumbar sacral and cervical spine disc disease  Patient is under care of chiropractor  She uses Skelaxin and tizanidine in alternating matter  She is well aware not to combine those 2 muscle relaxants  Chronic pain is well controlled with sparingly and p r n  use of hydrocodone  Generalized anxiety disorder  Depression  Symptoms are well controlled on Zoloft 200 mg daily and Klonopin as needed  Vitamin B12 deficiency  Iron deficiency  Patient uses iron sulfate daily  She is up-to-date with colonoscopy and EGD  Will continue with vitamin B12 injections on a monthly basis  Acid reflux disease:  Patient remains on PP I   IBS:  She uses Bentyl as needed    Will proceed with blood work including LFTs, lipid panel and vitamin-D  Will follow-up over the phone  Routine follow-up in the office in 4 months  I have spent 25 minutes with Patient  today in which greater than 50% of this time was spent in counseling/coordination of care regarding Diagnostic results, Prognosis, Risks and benefits of tx options, Intructions for management, Patient and family education, Importance of tx compliance, Risk factor reductions and Impressions  There are no Patient Instructions on file for this visit  Chief Complaint     Chief Complaint   Patient presents with    Follow-up     Patient is here for a follow up visit  History of Present Illness     Patient presents for follow-up of chronic medical conditions  She has been experiencing chronic pain of osteoarthritis  , chronic low back and neck pain, known lumbar sacral disc disease  She uses oxycodone rarely, sparingly as needed  Patient remains on tizanidine on a as needed basis as well  Patient remains under care of chiropractor, DR James Joshua   Results of recent blood work discussed  Patient remains on medications for hypertension, hyperlipidemia  Her alkaline phosphatase was very mildly elevated at 119  She denies any abdominal pain, dyspepsia or nausea/vomiting  Patient does have gallbladder  Her vitamin-D level was decreased  She remains on vitamin B12 injections on a monthly basis as directed  Symptoms of depression anxiety are well controlled on Zoloft 200 mg daily and Klonopin 0 5 mg as needed  Patient remains on levothyroxine for treatment of hypothyroidism  UTD with colonoscopy and EGD till 78 th bday  Patient is up-to-date with mammography  Review of Systems   Review of Systems   Constitutional: Negative  HENT: Negative  Respiratory: Negative  Cardiovascular: Negative  Gastrointestinal: Negative  Genitourinary: Negative      Musculoskeletal: Positive for arthralgias and back pain    Allergic/Immunologic: Negative  Neurological: Negative  Hematological: Negative  Psychiatric/Behavioral:        As outlined in HPI       Active Problem List     Patient Active Problem List   Diagnosis    Benign essential hypertension    Hypothyroidism    Lumbosacral radiculitis    Pernicious anemia    Cervical stenosis of spinal canal    Disc disorder of lumbar region    Other osteoporosis without current pathological fracture    Gastroesophageal reflux disease without esophagitis    Seasonal allergic rhinitis due to pollen    PSVT (paroxysmal supraventricular tachycardia) (HCC)    Iron deficiency anemia secondary to inadequate dietary iron intake    Irritable bowel syndrome with diarrhea    Elevated alkaline phosphatase level    KERON (generalized anxiety disorder)    Mixed hyperlipidemia       Past Medical History:  Past Medical History:   Diagnosis Date    Anxiety and depression     Bleeding ulcer     GERD (gastroesophageal reflux disease)     Spinal meningioma ([address] )        Past Surgical History:  Past Surgical History:   Procedure Laterality Date    SPINAL CORD STIMULATOR IMPLANT      SPINE SURGERY         Family History:  Family History   Problem Relation Age of Onset    Heart disease Mother     Esophageal cancer Father     Kidney cancer Father     Thyroid disease Father     Diabetes Son        Social History:  Social History     Social History    Marital status:      Spouse name: N/A    Number of children: N/A    Years of education: N/A     Occupational History    Not on file       Social History Main Topics    Smoking status: Never Smoker    Smokeless tobacco: Never Used    Alcohol use No    Drug use: No    Sexual activity: Not on file     Other Topics Concern    Not on file     Social History Narrative        Retired    2 adult sons    Grandchildren       Objective     Vitals:    07/11/18 1050   BP: 102/60   Pulse: 70   Temp: (!) 97 2 °F (36 2 °C)     Wt Readings from Last 3 Encounters:   07/11/18 66 3 kg (146 lb 3 2 oz)   04/11/18 65 3 kg (144 lb)   02/21/18 66 1 kg (145 lb 12 8 oz)       Physical Exam   Constitutional: She is oriented to person, place, and time  She appears well-developed and well-nourished  HENT:   Head: Normocephalic and atraumatic  Eyes: Conjunctivae are normal    Neck: Neck supple  Carotid bruit is not present  No thyromegaly present  Cardiovascular: Normal rate, regular rhythm and normal heart sounds  Pulmonary/Chest: Effort normal and breath sounds normal  She has no wheezes  She has no rales  Abdominal: Soft  Normal appearance and bowel sounds are normal  She exhibits no distension and no abdominal bruit  There is no tenderness  There is no rebound  Musculoskeletal: Normal range of motion  Chronic arthritic deformities bilateral hands/fingers  T-spine kyphosis   Neurological: She is alert and oriented to person, place, and time  No cranial nerve deficit  Psychiatric: She has a normal mood and affect  Her behavior is normal    Nursing note and vitals reviewed        Pertinent Laboratory/Diagnostic Studies:  Lab Results   Component Value Date    GLUCOSE 100 04/30/2018    BUN 22 04/30/2018    CREATININE 0 74 04/30/2018    CALCIUM 9 6 04/30/2018     04/30/2018    K 4 3 04/30/2018    CO2 31 04/30/2018     04/30/2018     Lab Results   Component Value Date    ALT 34 07/11/2018    AST 27 07/11/2018    ALKPHOS 118 (H) 07/11/2018    BILITOT 0 55 07/11/2018       Lab Results   Component Value Date    WBC 7 90 04/30/2018    HGB 13 9 04/30/2018    HCT 42 7 04/30/2018    MCV 94 04/30/2018     04/30/2018       No results found for: TSH    Lab Results   Component Value Date    CHOL 189 07/11/2018     Lab Results   Component Value Date    TRIG 135 07/11/2018     Lab Results   Component Value Date    HDL 54 07/11/2018     Lab Results   Component Value Date    LDLCALC 108 (H) 07/11/2018     No results found for: HGBA1C    Results for orders placed or performed in visit on 04/30/18   CBC and differential   Result Value Ref Range    WBC 7 90 4 31 - 10 16 Thousand/uL    RBC 4 56 3 81 - 5 12 Million/uL    Hemoglobin 13 9 11 5 - 15 4 g/dL    Hematocrit 42 7 34 8 - 46 1 %    MCV 94 82 - 98 fL    MCH 30 5 26 8 - 34 3 pg    MCHC 32 6 31 4 - 37 4 g/dL    RDW 13 2 11 6 - 15 1 %    MPV 9 7 8 9 - 12 7 fL    Platelets 156 977 - 180 Thousands/uL    nRBC 0 /100 WBCs    Neutrophils Relative 68 43 - 75 %    Lymphocytes Relative 24 14 - 44 %    Monocytes Relative 6 4 - 12 %    Eosinophils Relative 2 0 - 6 %    Basophils Relative 0 0 - 1 %    Neutrophils Absolute 5 32 1 85 - 7 62 Thousands/µL    Lymphocytes Absolute 1 91 0 60 - 4 47 Thousands/µL    Monocytes Absolute 0 49 0 17 - 1 22 Thousand/µL    Eosinophils Absolute 0 14 0 00 - 0 61 Thousand/µL    Basophils Absolute 0 02 0 00 - 0 10 Thousands/µL   Comprehensive metabolic panel   Result Value Ref Range    Sodium 138 136 - 145 mmol/L    Potassium 4 3 3 5 - 5 3 mmol/L    Chloride 101 100 - 108 mmol/L    CO2 31 21 - 32 mmol/L    Anion Gap 6 4 - 13 mmol/L    BUN 22 5 - 25 mg/dL    Creatinine 0 74 0 60 - 1 30 mg/dL    Glucose 100 65 - 140 mg/dL    Calcium 9 6 8 3 - 10 1 mg/dL    AST 31 5 - 45 U/L    ALT 40 12 - 78 U/L    Alkaline Phosphatase 119 (H) 46 - 116 U/L    Total Protein 8 9 (H) 6 4 - 8 2 g/dL    Albumin 4 4 3 5 - 5 0 g/dL    Total Bilirubin 0 60 0 20 - 1 00 mg/dL    eGFR 86 ml/min/1 73sq m   TSH, 3rd generation   Result Value Ref Range    TSH 3RD GENERATON 0 420 0 358 - 3 740 uIU/mL   Vitamin D 25 hydroxy   Result Value Ref Range    Vit D, 25-Hydroxy 23 1 (L) 30 0 - 100 0 ng/mL   Vitamin B12   Result Value Ref Range    Vitamin B-12 >6,000 (H) 100 - 900 pg/mL       Orders Placed This Encounter   Procedures    Vitamin D 25 hydroxy    Lipid panel       ALLERGIES:  Allergies   Allergen Reactions    Anesthesia S-I-40  [Propofol]     Celecoxib     Cephalexin     Cephalosporins Other (See Comments)     GI Upset (vomit/diarrhea)    Choline Magnesium Trisalicylate     Diclofenac     Diclofenac Potassium GI Intolerance    Diclofenac Sodium GI Intolerance    Fentanyl     Metformin      Other reaction(s): Nausea and/or vomiting    Nabumetone GI Intolerance    Other GI Intolerance    Propoxyphene Other (See Comments)     unknown    Sulfa Antibiotics     Sulfacetamide     Zolpidem        Current Medications     Current Outpatient Prescriptions   Medication Sig Dispense Refill    amLODIPine (NORVASC) 5 mg tablet Take 5 mg by mouth daily  3    atorvastatin (LIPITOR) 40 mg tablet Take 40 mg by mouth 3 (three) times a week    3    Calcium Carbonate-Vitamin D (CALCIUM CREAMIES PO) Take 600 mg by mouth daily      Cholecalciferol ([employer] VITAMIN D3) 1000 units capsule Take by mouth      clonazePAM (KlonoPIN) 1 mg tablet Take 0 5 tablets (0 5 mg total) by mouth daily 15 tablet 2    Cyanocobalamin 1000 MCG/ML LIQD cyanocobalamin (vit B-12) 1,000 mcg/mL injection solution      doxycycline hyclate (VIBRA-TABS) 100 mg tablet doxycycline hyclate 100 mg tablet      ferrous sulfate 325 (65 Fe) mg tablet Take by oral route        fluticasone (FLONASE) 50 mcg/act nasal spray 2 sprays into each nostril daily 16 g 5    HYDROcodone-acetaminophen (NORCO) 5-325 mg per tablet Take 1 tablet by mouth every 6 (six) hours as needed for pain (severe back/ neck  pain) Max Daily Amount: 4 tablets 30 tablet 0    levothyroxine 125 mcg tablet TAKE 1 TABLET (125 MCG TOTAL) BY MOUTH DAILY 30 tablet 2    lidocaine (LIDODERM) 5 % Apply topically      metaxalone (SKELAXIN) 800 mg tablet Take by mouth      nebivolol (BYSTOLIC) 10 mg tablet Take by mouth      omega-3-acid ethyl esters (LOVAZA) 1 g capsule Take 2 g by mouth daily      sertraline (ZOLOFT) 100 mg tablet Take 200 mg by mouth daily       TiZANidine (ZANAFLEX) 4 MG capsule TAKE ONE CAPSULE BY MOUTH 3 TIMES A DAY AS NEEDED FOR MUSCLE SPASMS  1    dicyclomine (BENTYL) 10 mg capsule Take 1 capsule (10 mg total) by mouth 4 (four) times a day (before meals and at bedtime) 120 capsule 5     Current Facility-Administered Medications   Medication Dose Route Frequency Provider Last Rate Last Dose    cyanocobalamin injection 1,000 mcg  1,000 mcg Intramuscular Q30 Days Zoe Goldstein MD   1,000 mcg at 02/21/18 1534    cyanocobalamin injection 1,000 mcg  1,000 mcg Intramuscular Q30 Days Zoe Goldstein MD   1,000 mcg at 03/29/18 3686    cyanocobalamin injection 1,000 mcg  1,000 mcg Intramuscular Q30 Days Zoe Goldstein MD   1,000 mcg at 04/30/18 1737    cyanocobalamin injection 1,000 mcg  1,000 mcg Intramuscular Q30 Days Zoe Goldstein MD   1,000 mcg at 07/11/18 1220         Health Maintenance     Health Maintenance   Topic Date Due    HIV SCREENING  1955    Hepatitis C Screening  1955    CRC Screening: Colonoscopy  1955    DTaP,Tdap,and Td Vaccines (2 - Td) 02/17/2018    INFLUENZA VACCINE  09/01/2018    Depression Screening PHQ-9  07/11/2019     Immunization History   Administered Date(s) Administered    Influenza 10/08/2007, 10/24/2008, 10/13/2009, 10/17/2011, 10/13/2012, 10/21/2013, 11/05/2014, 10/02/2015, 10/13/2016, 10/10/2017, 10/10/2017    Td (adult), Unspecified 08/12/1993    Tdap 02/17/2008    Zoster 04/29/2014       Zoe Goldstein MD

## 2018-07-13 ENCOUNTER — TELEPHONE (OUTPATIENT)
Dept: FAMILY MEDICINE CLINIC | Facility: CLINIC | Age: 63
End: 2018-07-13

## 2018-07-13 DIAGNOSIS — R74.8 ELEVATED ALKALINE PHOSPHATASE LEVEL: Primary | ICD-10-CM

## 2018-07-13 NOTE — TELEPHONE ENCOUNTER
----- Message from Cecille Hess MD sent at 7/13/2018  6:03 AM EDT -----  Please contact patient  I received her blood work  Her vitamin-D level has improved significantly  Her cholesterol is excellent  Please continue same daily medications  Alkaline phosphatase, liver test, is still very minimally elevated only by 2 points above normal   I am not concerned about this minimal elevation per se but just for comprehensive sake I believe it would be prudent to check right upper quadrant ultrasound to rule out possible quite gallstones  I will place orders      Thank you

## 2018-08-10 ENCOUNTER — CLINICAL SUPPORT (OUTPATIENT)
Dept: FAMILY MEDICINE CLINIC | Facility: CLINIC | Age: 63
End: 2018-08-10
Payer: MEDICARE

## 2018-08-10 DIAGNOSIS — E53.8 B12 DEFICIENCY: Primary | ICD-10-CM

## 2018-08-10 PROCEDURE — 96372 THER/PROPH/DIAG INJ SC/IM: CPT | Performed by: FAMILY MEDICINE

## 2018-08-10 RX ORDER — CYANOCOBALAMIN 1000 UG/ML
1000 INJECTION INTRAMUSCULAR; SUBCUTANEOUS
Status: SHIPPED | OUTPATIENT
Start: 2018-08-10

## 2018-08-10 RX ADMIN — CYANOCOBALAMIN 1000 MCG: 1000 INJECTION INTRAMUSCULAR; SUBCUTANEOUS at 10:17

## 2018-08-21 DIAGNOSIS — F41.1 GAD (GENERALIZED ANXIETY DISORDER): ICD-10-CM

## 2018-08-21 DIAGNOSIS — M51.36 DDD (DEGENERATIVE DISC DISEASE), LUMBAR: ICD-10-CM

## 2018-08-21 RX ORDER — SERTRALINE HYDROCHLORIDE 100 MG/1
TABLET, FILM COATED ORAL
Qty: 60 TABLET | Refills: 3 | Status: SHIPPED | OUTPATIENT
Start: 2018-08-21 | End: 2018-12-13 | Stop reason: SDUPTHER

## 2018-08-21 RX ORDER — CLONAZEPAM 1 MG/1
0.5 TABLET ORAL DAILY
Qty: 15 TABLET | Refills: 3 | Status: SHIPPED | OUTPATIENT
Start: 2018-08-21 | End: 2018-12-18 | Stop reason: SDUPTHER

## 2018-08-22 DIAGNOSIS — M51.36 DDD (DEGENERATIVE DISC DISEASE), LUMBAR: ICD-10-CM

## 2018-08-22 RX ORDER — HYDROCODONE BITARTRATE AND ACETAMINOPHEN 5; 325 MG/1; MG/1
TABLET ORAL
Qty: 30 TABLET | Refills: 0 | Status: SHIPPED | OUTPATIENT
Start: 2018-08-22 | End: 2018-09-29 | Stop reason: SDUPTHER

## 2018-08-22 RX ORDER — HYDROCODONE BITARTRATE AND ACETAMINOPHEN 5; 325 MG/1; MG/1
1 TABLET ORAL EVERY 6 HOURS PRN
Qty: 30 TABLET | Refills: 0 | OUTPATIENT
Start: 2018-08-22

## 2018-09-07 DIAGNOSIS — E03.9 HYPOTHYROIDISM, UNSPECIFIED TYPE: ICD-10-CM

## 2018-09-08 RX ORDER — OMEPRAZOLE 40 MG/1
CAPSULE, DELAYED RELEASE ORAL
COMMUNITY
Start: 2018-08-09 | End: 2018-10-10

## 2018-09-08 RX ORDER — LEVOTHYROXINE SODIUM 0.12 MG/1
125 TABLET ORAL DAILY
Qty: 30 TABLET | Refills: 2 | Status: SHIPPED | OUTPATIENT
Start: 2018-09-08 | End: 2019-01-07 | Stop reason: SDUPTHER

## 2018-09-10 ENCOUNTER — CLINICAL SUPPORT (OUTPATIENT)
Dept: FAMILY MEDICINE CLINIC | Facility: CLINIC | Age: 63
End: 2018-09-10
Payer: MEDICARE

## 2018-09-10 DIAGNOSIS — E53.8 B12 DEFICIENCY: Primary | ICD-10-CM

## 2018-09-10 PROCEDURE — 96372 THER/PROPH/DIAG INJ SC/IM: CPT | Performed by: FAMILY MEDICINE

## 2018-09-10 RX ORDER — CYANOCOBALAMIN 1000 UG/ML
1000 INJECTION INTRAMUSCULAR; SUBCUTANEOUS
Status: SHIPPED | OUTPATIENT
Start: 2018-09-10

## 2018-09-10 RX ADMIN — CYANOCOBALAMIN 1000 MCG: 1000 INJECTION INTRAMUSCULAR; SUBCUTANEOUS at 11:27

## 2018-09-29 DIAGNOSIS — M51.36 DDD (DEGENERATIVE DISC DISEASE), LUMBAR: ICD-10-CM

## 2018-09-29 RX ORDER — HYDROCODONE BITARTRATE AND ACETAMINOPHEN 5; 325 MG/1; MG/1
TABLET ORAL
Qty: 30 TABLET | Refills: 0 | Status: SHIPPED | OUTPATIENT
Start: 2018-09-29 | End: 2018-11-09 | Stop reason: SDUPTHER

## 2018-10-02 ENCOUNTER — TELEPHONE (OUTPATIENT)
Dept: FAMILY MEDICINE CLINIC | Facility: CLINIC | Age: 63
End: 2018-10-02

## 2018-10-02 NOTE — TELEPHONE ENCOUNTER
Patient states Dr Rayne Irwin asked her to call today to give an update on how she is doing & she is getting better slowly but surely

## 2018-10-10 ENCOUNTER — CLINICAL SUPPORT (OUTPATIENT)
Dept: FAMILY MEDICINE CLINIC | Facility: CLINIC | Age: 63
End: 2018-10-10
Payer: MEDICARE

## 2018-10-10 ENCOUNTER — OFFICE VISIT (OUTPATIENT)
Dept: FAMILY MEDICINE CLINIC | Facility: CLINIC | Age: 63
End: 2018-10-10
Payer: MEDICARE

## 2018-10-10 VITALS
SYSTOLIC BLOOD PRESSURE: 140 MMHG | WEIGHT: 148.8 LBS | BODY MASS INDEX: 29.21 KG/M2 | HEART RATE: 60 BPM | RESPIRATION RATE: 16 BRPM | DIASTOLIC BLOOD PRESSURE: 84 MMHG | TEMPERATURE: 98.4 F | HEIGHT: 60 IN

## 2018-10-10 DIAGNOSIS — E53.8 B12 DEFICIENCY: Primary | ICD-10-CM

## 2018-10-10 DIAGNOSIS — S33.8XXA SACRUM SPRAIN, INITIAL ENCOUNTER: Primary | ICD-10-CM

## 2018-10-10 DIAGNOSIS — Z23 NEED FOR INFLUENZA VACCINATION: ICD-10-CM

## 2018-10-10 DIAGNOSIS — Z23 ENCOUNTER FOR IMMUNIZATION: ICD-10-CM

## 2018-10-10 DIAGNOSIS — M51.9 DISC DISORDER OF LUMBAR REGION: ICD-10-CM

## 2018-10-10 DIAGNOSIS — K21.9 GASTROESOPHAGEAL REFLUX DISEASE WITHOUT ESOPHAGITIS: ICD-10-CM

## 2018-10-10 PROCEDURE — 90682 RIV4 VACC RECOMBINANT DNA IM: CPT

## 2018-10-10 PROCEDURE — 99213 OFFICE O/P EST LOW 20 MIN: CPT | Performed by: FAMILY MEDICINE

## 2018-10-10 PROCEDURE — 90471 IMMUNIZATION ADMIN: CPT

## 2018-10-10 RX ORDER — METAXALONE 800 MG/1
800 TABLET ORAL 2 TIMES DAILY PRN
Qty: 60 TABLET | Refills: 2 | Status: SHIPPED | OUTPATIENT
Start: 2018-10-10 | End: 2020-10-08

## 2018-10-10 RX ORDER — CYANOCOBALAMIN 1000 UG/ML
1000 INJECTION INTRAMUSCULAR; SUBCUTANEOUS ONCE
Status: COMPLETED | OUTPATIENT
Start: 2018-10-10 | End: 2018-10-10

## 2018-10-10 RX ORDER — TIZANIDINE HYDROCHLORIDE 4 MG/1
CAPSULE, GELATIN COATED ORAL
Qty: 60 CAPSULE | Refills: 0
Start: 2018-10-10 | End: 2019-02-04 | Stop reason: SDUPTHER

## 2018-10-10 RX ORDER — PANTOPRAZOLE SODIUM 40 MG/1
40 TABLET, DELAYED RELEASE ORAL DAILY
Qty: 30 TABLET | Refills: 1 | Status: SHIPPED | OUTPATIENT
Start: 2018-10-10 | End: 2020-08-18 | Stop reason: SDUPTHER

## 2018-10-10 RX ADMIN — CYANOCOBALAMIN 1000 MCG: 1000 INJECTION INTRAMUSCULAR; SUBCUTANEOUS at 12:28

## 2018-10-10 NOTE — PROGRESS NOTES
FAMILY PRACTICE OFFICE VISIT       NAME: Deisy Bell  AGE: 58 y o  SEX: female       : 1955        MRN: [id number]        Assessment and Plan     Problem List Items Addressed This Visit     Disc disorder of lumbar region    Relevant Medications    metaxalone (SKELAXIN) 800 mg tablet    TiZANidine (ZANAFLEX) 4 MG capsule    Gastroesophageal reflux disease without esophagitis    Relevant Medications    RaNITidine HCl (ZANTAC 75 PO)    pantoprazole (PROTONIX) 40 mg tablet      Other Visit Diagnoses     Sacrum sprain, initial encounter    -  Primary    Relevant Orders    XR sacrum and coccyx    Need for influenza vaccination           Patient presents for evaluation of lower back pain after recent injury  Will proceed with x-ray to rule out possibility of occult fracture  Patient will continue regimen of ibuprofen 600 mg q day and oxycodone as needed daily  She will start using muscle relaxants as outlined above  I advised her to start Protonix once a day while on ibuprofen for secondary GI protection due to remote history of peptic ulcer disease  Patient will use PPI only while on anti-inflammatory  Orders for  TENS unit  Flu vaccine was administered today        Patient Instructions   Please take 600 mg of ibuprofen  Once a day after food    Please use Protonix while you are on ibuprofen for secondary gastrointestinal protection          Chief Complaint     Chief Complaint   Patient presents with    Back Pain     Pt is here w/ c/o lower back pian after falling two weeks ago        History of Present Illness     Patient presents for evaluation of lower back pain after falling in late September  Reportedly she lost balance while making her bed and hit her lower back/tailbone area against the chest   She has been experiencing pain in the lower sacrum/coccygeal area ever since  Patient uses oxycodone once daily  She has tried ibuprofen 600 mg on a few occasions and medication has helped    Patient is concerned about long-term use of ibuprofen due to remote history of peptic ulcer disease  She denies any GI upset at present time  Patient uses Zantac over-the-counter as needed  She is also requesting refill of tizanidine and  Skelaxin   Patient usually uses Skelaxin daytime and tizanidine at night  Review of Systems   Review of Systems   Constitutional: Negative  HENT: Negative  Respiratory: Negative  Gastrointestinal: Negative  Musculoskeletal: Positive for arthralgias and back pain  Neurological: Negative  Psychiatric/Behavioral: Negative  Active Problem List     Patient Active Problem List   Diagnosis    Benign essential hypertension    Hypothyroidism    Lumbosacral radiculitis    Pernicious anemia    Cervical stenosis of spinal canal    Disc disorder of lumbar region    Other osteoporosis without current pathological fracture    Gastroesophageal reflux disease without esophagitis    Seasonal allergic rhinitis due to pollen    PSVT (paroxysmal supraventricular tachycardia) (HCC)    Iron deficiency anemia secondary to inadequate dietary iron intake    Irritable bowel syndrome with diarrhea    Elevated alkaline phosphatase level    KERON (generalized anxiety disorder)    Mixed hyperlipidemia       Past Medical History:  Past Medical History:   Diagnosis Date    Anxiety and depression     Bleeding ulcer     GERD (gastroesophageal reflux disease)     Spinal meningioma ([address] )        Past Surgical History:  Past Surgical History:   Procedure Laterality Date    SPINAL CORD STIMULATOR IMPLANT      SPINE SURGERY         Family History:  Family History   Problem Relation Age of Onset    Heart disease Mother     Esophageal cancer Father     Kidney cancer Father     Thyroid disease Father     Diabetes Son        Social History:  Social History     Social History    Marital status:       Spouse name: N/A    Number of children: N/A    Years of education: N/A Occupational History    Not on file  Social History Main Topics    Smoking status: Never Smoker    Smokeless tobacco: Never Used    Alcohol use No    Drug use: No    Sexual activity: Not on file     Other Topics Concern    Not on file     Social History Narrative        Retired    2 adult sons    Grandchildren         Objective     Vitals:    10/10/18 1504   BP: 140/84   BP Location: Left arm   Patient Position: Sitting   Cuff Size: Adult   Pulse: 60   Resp: 16   Temp: 98 4 °F (36 9 °C)   TempSrc: Tympanic   Weight: 67 5 kg (148 lb 12 8 oz)   Height: 5' (1 524 m)     Wt Readings from Last 3 Encounters:   10/10/18 67 5 kg (148 lb 12 8 oz)   07/11/18 66 3 kg (146 lb 3 2 oz)   04/11/18 65 3 kg (144 lb)       Physical Exam   Constitutional: She is oriented to person, place, and time  She appears well-developed and well-nourished  HENT:   Head: Normocephalic and atraumatic  Eyes: Conjunctivae are normal    Neck: Neck supple  Carotid bruit is not present  No thyromegaly present  Cardiovascular: Normal rate, regular rhythm and normal heart sounds  No murmur heard  Pulmonary/Chest: Effort normal and breath sounds normal  No respiratory distress  She has no wheezes  Abdominal: She exhibits no abdominal bruit  Musculoskeletal: Normal range of motion  She exhibits no edema  Tenderness with palpation of sacrum and coccyx  Negative straight leg raising bilaterally  No tenderness with palpation of lumbar spine  No ecchymosis or localized edema  Neurological: She is alert and oriented to person, place, and time  No cranial nerve deficit  Coordination normal    Psychiatric: She has a normal mood and affect  Her behavior is normal    Nursing note and vitals reviewed        Pertinent Laboratory/Diagnostic Studies:  Lab Results   Component Value Date    BUN 22 04/30/2018    CREATININE 0 74 04/30/2018    CALCIUM 9 6 04/30/2018     04/30/2018    K 4 3 04/30/2018    CO2 31 04/30/2018  04/30/2018     Lab Results   Component Value Date    ALT 34 07/11/2018    AST 27 07/11/2018    ALKPHOS 118 (H) 07/11/2018       Lab Results   Component Value Date    WBC 7 90 04/30/2018    HGB 13 9 04/30/2018    HCT 42 7 04/30/2018    MCV 94 04/30/2018     04/30/2018       No results found for: TSH    No results found for: CHOL  Lab Results   Component Value Date    TRIG 135 07/11/2018     Lab Results   Component Value Date    HDL 54 07/11/2018     Lab Results   Component Value Date    LDLCALC 108 (H) 07/11/2018     No results found for: HGBA1C    Results for orders placed or performed in visit on 07/11/18   Hepatic function panel   Result Value Ref Range    Total Bilirubin 0 55 0 20 - 1 00 mg/dL    Bilirubin, Direct 0 09 0 00 - 0 20 mg/dL    Alkaline Phosphatase 118 (H) 46 - 116 U/L    AST 27 5 - 45 U/L    ALT 34 12 - 78 U/L    Total Protein 8 4 (H) 6 4 - 8 2 g/dL    Albumin 4 1 3 5 - 5 0 g/dL   Vitamin D 25 hydroxy   Result Value Ref Range    Vit D, 25-Hydroxy 31 0 30 0 - 100 0 ng/mL   Lipid panel   Result Value Ref Range    Cholesterol 189 50 - 200 mg/dL    Triglycerides 135 <=150 mg/dL    HDL, Direct 54 40 - 60 mg/dL    LDL Calculated 108 (H) 0 - 100 mg/dL    Non-HDL-Chol (CHOL-HDL) 135 mg/dl       Orders Placed This Encounter   Procedures    XR sacrum and coccyx       ALLERGIES:  Allergies   Allergen Reactions    Anesthesia S-I-40  [Propofol]     Celecoxib     Cephalexin     Cephalosporins Other (See Comments)     GI Upset (vomit/diarrhea)    Choline Magnesium Trisalicylate     Diclofenac     Diclofenac Potassium GI Intolerance    Diclofenac Sodium GI Intolerance    Fentanyl     Metformin      Other reaction(s): Nausea and/or vomiting    Nabumetone GI Intolerance    Other GI Intolerance    Propoxyphene Other (See Comments)     unknown    Sulfa Antibiotics     Sulfacetamide     Zolpidem        Current Medications     Current Outpatient Prescriptions   Medication Sig Dispense Refill  amLODIPine (NORVASC) 5 mg tablet Take 5 mg by mouth daily  3    atorvastatin (LIPITOR) 40 mg tablet Take 40 mg by mouth 3 (three) times a week    3    Calcium Carbonate-Vitamin D (CALCIUM CREAMIES PO) Take 600 mg by mouth daily      Cholecalciferol ([employer] VITAMIN D3) 1000 units capsule Take by mouth      clonazePAM (KlonoPIN) 1 mg tablet TAKE 0 5 TABLETS (0 5 MG TOTAL) BY MOUTH DAILY 15 tablet 3    Cyanocobalamin 1000 MCG/ML LIQD cyanocobalamin (vit B-12) 1,000 mcg/mL injection solution      dicyclomine (BENTYL) 10 mg capsule Take 1 capsule (10 mg total) by mouth 4 (four) times a day (before meals and at bedtime) 120 capsule 5    doxycycline hyclate (VIBRA-TABS) 100 mg tablet doxycycline hyclate 100 mg tablet      ferrous sulfate 325 (65 Fe) mg tablet Take by oral route        fluticasone (FLONASE) 50 mcg/act nasal spray 2 sprays into each nostril daily 16 g 5    HYDROcodone-acetaminophen (NORCO) 5-325 mg per tablet Take 1 tablet 3 times a day as needed for severe low back pain 30 tablet 0    levothyroxine 125 mcg tablet TAKE 1 TABLET (125 MCG TOTAL) BY MOUTH DAILY 30 tablet 2    lidocaine (LIDODERM) 5 % Apply topically      metaxalone (SKELAXIN) 800 mg tablet Take 1 tablet (800 mg total) by mouth 2 (two) times a day as needed for muscle spasms 60 tablet 2    nebivolol (BYSTOLIC) 10 mg tablet Take 10 mg by mouth daily        omega-3-acid ethyl esters (LOVAZA) 1 g capsule Take 2 g by mouth daily      RaNITidine HCl (ZANTAC 75 PO) Take 75 mg by mouth daily      sertraline (ZOLOFT) 100 mg tablet TAKE 2 TABLETS BY MOUTH EVERY DAY 60 tablet 3    TiZANidine (ZANAFLEX) 4 MG capsule Take 2 caps qhs PRN : muscle spasms 60 capsule 0    pantoprazole (PROTONIX) 40 mg tablet Take 1 tablet (40 mg total) by mouth daily 30 tablet 1     Current Facility-Administered Medications   Medication Dose Route Frequency Provider Last Rate Last Dose    cyanocobalamin injection 1,000 mcg  1,000 mcg Intramuscular Q30 Days Kaylyn Morris MD   1,000 mcg at 02/21/18 1534    cyanocobalamin injection 1,000 mcg  1,000 mcg Intramuscular Q30 Days Kaylyn Morris MD   1,000 mcg at 03/29/18 6358    cyanocobalamin injection 1,000 mcg  1,000 mcg Intramuscular Q30 Days Kaylyn Morris MD   1,000 mcg at 04/30/18 1737    cyanocobalamin injection 1,000 mcg  1,000 mcg Intramuscular Q30 Days Kaylyn Morris MD   1,000 mcg at 07/11/18 1220    cyanocobalamin injection 1,000 mcg  1,000 mcg Intramuscular Y81 Days Dennise Soler MD   3,649 mcg at 08/10/18 1017    cyanocobalamin injection 1,000 mcg  1,000 mcg Intramuscular Q30 Days Kaylyn Morris MD   1,000 mcg at 09/10/18 1127         Health Maintenance     Health Maintenance   Topic Date Due    DTaP,Tdap,and Td Vaccines (2 - Td) 02/17/2018    Depression Screening PHQ  07/11/2019    CRC Screening: Colonoscopy  08/28/2023    INFLUENZA VACCINE  Completed     Immunization History   Administered Date(s) Administered    Influenza 10/08/2007, 10/24/2008, 10/13/2009, 10/17/2011, 10/13/2012, 10/21/2013, 11/05/2014, 10/02/2015, 10/13/2016, 10/10/2017, 10/10/2017    Influenza, recombinant, quadrivalent,injectable, preservative free 10/10/2018    Td (adult), Unspecified 08/12/1993    Tdap 02/17/2008    Zoster 04/29/2014       Kaylyn Morris MD

## 2018-10-10 NOTE — PATIENT INSTRUCTIONS
Please take 600 mg of ibuprofen  Once a day after food    Please use Protonix while you are on ibuprofen for secondary gastrointestinal protection

## 2018-10-11 DIAGNOSIS — M54.41 CHRONIC BILATERAL LOW BACK PAIN WITH BILATERAL SCIATICA: Primary | ICD-10-CM

## 2018-10-11 DIAGNOSIS — M54.42 CHRONIC BILATERAL LOW BACK PAIN WITH BILATERAL SCIATICA: Primary | ICD-10-CM

## 2018-10-11 DIAGNOSIS — G89.29 CHRONIC BILATERAL LOW BACK PAIN WITH BILATERAL SCIATICA: Primary | ICD-10-CM

## 2018-10-13 DIAGNOSIS — K58.0 IRRITABLE BOWEL SYNDROME WITH DIARRHEA: ICD-10-CM

## 2018-10-15 RX ORDER — DICYCLOMINE HYDROCHLORIDE 10 MG/1
CAPSULE ORAL
Qty: 120 CAPSULE | Refills: 5 | Status: SHIPPED | OUTPATIENT
Start: 2018-10-15 | End: 2019-05-09

## 2018-11-09 ENCOUNTER — CLINICAL SUPPORT (OUTPATIENT)
Dept: FAMILY MEDICINE CLINIC | Facility: CLINIC | Age: 63
End: 2018-11-09
Payer: MEDICARE

## 2018-11-09 DIAGNOSIS — E53.8 B12 DEFICIENCY: Primary | ICD-10-CM

## 2018-11-09 DIAGNOSIS — M51.36 DDD (DEGENERATIVE DISC DISEASE), LUMBAR: ICD-10-CM

## 2018-11-09 RX ORDER — CYANOCOBALAMIN 1000 UG/ML
1000 INJECTION INTRAMUSCULAR; SUBCUTANEOUS ONCE
Status: COMPLETED | OUTPATIENT
Start: 2018-11-09 | End: 2018-11-09

## 2018-11-09 RX ORDER — HYDROCODONE BITARTRATE AND ACETAMINOPHEN 5; 325 MG/1; MG/1
TABLET ORAL
Qty: 30 TABLET | Refills: 0 | Status: SHIPPED | OUTPATIENT
Start: 2018-11-09 | End: 2018-12-18 | Stop reason: SDUPTHER

## 2018-11-09 RX ADMIN — CYANOCOBALAMIN 1000 MCG: 1000 INJECTION INTRAMUSCULAR; SUBCUTANEOUS at 10:53

## 2018-11-13 DIAGNOSIS — J30.9 ALLERGIC RHINITIS: ICD-10-CM

## 2018-11-14 RX ORDER — FLUTICASONE PROPIONATE 50 MCG
SPRAY, SUSPENSION (ML) NASAL
Qty: 3 BOTTLE | Refills: 3 | Status: SHIPPED | OUTPATIENT
Start: 2018-11-14 | End: 2022-06-24 | Stop reason: ALTCHOICE

## 2018-12-10 ENCOUNTER — CLINICAL SUPPORT (OUTPATIENT)
Dept: FAMILY MEDICINE CLINIC | Facility: CLINIC | Age: 63
End: 2018-12-10
Payer: MEDICARE

## 2018-12-10 DIAGNOSIS — E53.8 B12 DEFICIENCY: Primary | ICD-10-CM

## 2018-12-10 PROCEDURE — 96372 THER/PROPH/DIAG INJ SC/IM: CPT | Performed by: FAMILY MEDICINE

## 2018-12-10 RX ORDER — CYANOCOBALAMIN 1000 UG/ML
1000 INJECTION INTRAMUSCULAR; SUBCUTANEOUS ONCE
Status: COMPLETED | OUTPATIENT
Start: 2018-12-10 | End: 2018-12-10

## 2018-12-10 RX ADMIN — CYANOCOBALAMIN 1000 MCG: 1000 INJECTION INTRAMUSCULAR; SUBCUTANEOUS at 17:44

## 2018-12-13 DIAGNOSIS — F41.1 GAD (GENERALIZED ANXIETY DISORDER): ICD-10-CM

## 2018-12-14 RX ORDER — SERTRALINE HYDROCHLORIDE 100 MG/1
TABLET, FILM COATED ORAL
Qty: 60 TABLET | Refills: 3 | Status: SHIPPED | OUTPATIENT
Start: 2018-12-14 | End: 2019-04-15 | Stop reason: SDUPTHER

## 2018-12-18 DIAGNOSIS — F41.1 GAD (GENERALIZED ANXIETY DISORDER): ICD-10-CM

## 2018-12-18 DIAGNOSIS — M51.36 DDD (DEGENERATIVE DISC DISEASE), LUMBAR: ICD-10-CM

## 2018-12-18 RX ORDER — CLONAZEPAM 1 MG/1
TABLET ORAL
Qty: 15 TABLET | Refills: 3 | Status: SHIPPED | OUTPATIENT
Start: 2018-12-18 | End: 2019-02-12 | Stop reason: SDUPTHER

## 2018-12-18 RX ORDER — HYDROCODONE BITARTRATE AND ACETAMINOPHEN 5; 325 MG/1; MG/1
TABLET ORAL
Qty: 30 TABLET | Refills: 0 | Status: SHIPPED | OUTPATIENT
Start: 2018-12-18 | End: 2018-12-21 | Stop reason: SDUPTHER

## 2018-12-19 DIAGNOSIS — F41.1 GAD (GENERALIZED ANXIETY DISORDER): ICD-10-CM

## 2018-12-21 ENCOUNTER — TELEPHONE (OUTPATIENT)
Dept: FAMILY MEDICINE CLINIC | Facility: CLINIC | Age: 63
End: 2018-12-21

## 2018-12-21 DIAGNOSIS — M51.36 DDD (DEGENERATIVE DISC DISEASE), LUMBAR: ICD-10-CM

## 2018-12-21 RX ORDER — HYDROCODONE BITARTRATE AND ACETAMINOPHEN 5; 325 MG/1; MG/1
TABLET ORAL
Qty: 30 TABLET | Refills: 0 | Status: SHIPPED | OUTPATIENT
Start: 2018-12-21 | End: 2019-02-04 | Stop reason: SDUPTHER

## 2018-12-21 RX ORDER — CLONAZEPAM 1 MG/1
TABLET ORAL
Qty: 15 TABLET | Refills: 1 | Status: SHIPPED | OUTPATIENT
Start: 2018-12-21 | End: 2019-02-11 | Stop reason: SDUPTHER

## 2018-12-21 NOTE — TELEPHONE ENCOUNTER
The prescription for Hydrocodone-acetaminophen was not transmitted to the pharmacy  Please redo the order

## 2019-01-07 DIAGNOSIS — E03.9 HYPOTHYROIDISM, UNSPECIFIED TYPE: ICD-10-CM

## 2019-01-07 RX ORDER — LEVOTHYROXINE SODIUM 125 UG/1
125 TABLET ORAL DAILY
Qty: 30 TABLET | Refills: 2 | Status: SHIPPED | OUTPATIENT
Start: 2019-01-07 | End: 2019-04-15 | Stop reason: SDUPTHER

## 2019-01-10 ENCOUNTER — CLINICAL SUPPORT (OUTPATIENT)
Dept: FAMILY MEDICINE CLINIC | Facility: CLINIC | Age: 64
End: 2019-01-10
Payer: MEDICARE

## 2019-01-10 DIAGNOSIS — E53.8 VITAMIN B12 DEFICIENCY: Primary | ICD-10-CM

## 2019-01-10 PROCEDURE — 96372 THER/PROPH/DIAG INJ SC/IM: CPT

## 2019-01-10 RX ORDER — CYANOCOBALAMIN 1000 UG/ML
1000 INJECTION INTRAMUSCULAR; SUBCUTANEOUS ONCE
Status: COMPLETED | OUTPATIENT
Start: 2019-01-10 | End: 2019-01-10

## 2019-01-10 RX ADMIN — CYANOCOBALAMIN 1000 MCG: 1000 INJECTION INTRAMUSCULAR; SUBCUTANEOUS at 11:57

## 2019-02-04 DIAGNOSIS — M51.36 DDD (DEGENERATIVE DISC DISEASE), LUMBAR: ICD-10-CM

## 2019-02-04 DIAGNOSIS — M51.9 DISC DISORDER OF LUMBAR REGION: ICD-10-CM

## 2019-02-04 RX ORDER — HYDROCODONE BITARTRATE AND ACETAMINOPHEN 5; 325 MG/1; MG/1
TABLET ORAL
Qty: 30 TABLET | Refills: 0 | Status: SHIPPED | OUTPATIENT
Start: 2019-02-04 | End: 2019-03-21 | Stop reason: SDUPTHER

## 2019-02-04 RX ORDER — TIZANIDINE HYDROCHLORIDE 4 MG/1
CAPSULE, GELATIN COATED ORAL
Qty: 60 CAPSULE | Refills: 3 | Status: SHIPPED | OUTPATIENT
Start: 2019-02-04 | End: 2019-06-04 | Stop reason: SDUPTHER

## 2019-02-11 ENCOUNTER — CLINICAL SUPPORT (OUTPATIENT)
Dept: FAMILY MEDICINE CLINIC | Facility: CLINIC | Age: 64
End: 2019-02-11
Payer: MEDICARE

## 2019-02-11 DIAGNOSIS — F41.1 GAD (GENERALIZED ANXIETY DISORDER): ICD-10-CM

## 2019-02-11 DIAGNOSIS — E53.8 VITAMIN B12 DEFICIENCY: Primary | ICD-10-CM

## 2019-02-11 PROCEDURE — 96372 THER/PROPH/DIAG INJ SC/IM: CPT | Performed by: FAMILY MEDICINE

## 2019-02-11 RX ORDER — CYANOCOBALAMIN 1000 UG/ML
1000 INJECTION INTRAMUSCULAR; SUBCUTANEOUS
Status: SHIPPED | OUTPATIENT
Start: 2019-02-11

## 2019-02-11 RX ADMIN — CYANOCOBALAMIN 1000 MCG: 1000 INJECTION INTRAMUSCULAR; SUBCUTANEOUS at 09:58

## 2019-02-12 RX ORDER — CLONAZEPAM 1 MG/1
0.5 TABLET ORAL DAILY
Qty: 30 TABLET | Refills: 5 | Status: SHIPPED | OUTPATIENT
Start: 2019-02-12 | End: 2019-03-19 | Stop reason: SDUPTHER

## 2019-03-11 ENCOUNTER — CLINICAL SUPPORT (OUTPATIENT)
Dept: FAMILY MEDICINE CLINIC | Facility: CLINIC | Age: 64
End: 2019-03-11
Payer: MEDICARE

## 2019-03-11 DIAGNOSIS — E53.8 VITAMIN B12 DEFICIENCY: Primary | ICD-10-CM

## 2019-03-11 PROCEDURE — 96372 THER/PROPH/DIAG INJ SC/IM: CPT

## 2019-03-11 RX ORDER — CYANOCOBALAMIN 1000 UG/ML
1000 INJECTION INTRAMUSCULAR; SUBCUTANEOUS ONCE
Status: COMPLETED | OUTPATIENT
Start: 2019-03-11 | End: 2019-03-11

## 2019-03-11 RX ADMIN — CYANOCOBALAMIN 1000 MCG: 1000 INJECTION INTRAMUSCULAR; SUBCUTANEOUS at 17:41

## 2019-03-19 DIAGNOSIS — R52 PAIN: ICD-10-CM

## 2019-03-19 DIAGNOSIS — F41.1 GAD (GENERALIZED ANXIETY DISORDER): ICD-10-CM

## 2019-03-19 DIAGNOSIS — I10 BENIGN ESSENTIAL HYPERTENSION: Primary | ICD-10-CM

## 2019-03-21 DIAGNOSIS — M51.36 DDD (DEGENERATIVE DISC DISEASE), LUMBAR: ICD-10-CM

## 2019-03-21 RX ORDER — AMLODIPINE BESYLATE 5 MG/1
5 TABLET ORAL DAILY
Qty: 30 TABLET | Refills: 5 | Status: SHIPPED | OUTPATIENT
Start: 2019-03-21 | End: 2019-08-26 | Stop reason: SDUPTHER

## 2019-03-21 RX ORDER — LIDOCAINE 50 MG/G
PATCH TOPICAL
Qty: 60 PATCH | Refills: 5 | Status: SHIPPED | OUTPATIENT
Start: 2019-03-21 | End: 2021-05-14

## 2019-03-21 RX ORDER — HYDROCODONE BITARTRATE AND ACETAMINOPHEN 5; 325 MG/1; MG/1
TABLET ORAL
Qty: 30 TABLET | Refills: 0 | Status: SHIPPED | OUTPATIENT
Start: 2019-03-21 | End: 2019-04-23 | Stop reason: SDUPTHER

## 2019-03-21 RX ORDER — CLONAZEPAM 1 MG/1
0.5 TABLET ORAL DAILY
Qty: 30 TABLET | Refills: 5 | Status: SHIPPED | OUTPATIENT
Start: 2019-03-21 | End: 2019-09-17 | Stop reason: SDUPTHER

## 2019-04-15 ENCOUNTER — CLINICAL SUPPORT (OUTPATIENT)
Dept: FAMILY MEDICINE CLINIC | Facility: CLINIC | Age: 64
End: 2019-04-15
Payer: MEDICARE

## 2019-04-15 DIAGNOSIS — E53.8 VITAMIN B12 DEFICIENCY: Primary | ICD-10-CM

## 2019-04-15 DIAGNOSIS — F41.1 GAD (GENERALIZED ANXIETY DISORDER): ICD-10-CM

## 2019-04-15 DIAGNOSIS — E03.9 HYPOTHYROIDISM, UNSPECIFIED TYPE: ICD-10-CM

## 2019-04-15 PROCEDURE — 96372 THER/PROPH/DIAG INJ SC/IM: CPT | Performed by: FAMILY MEDICINE

## 2019-04-15 RX ORDER — CYANOCOBALAMIN 1000 UG/ML
1000 INJECTION INTRAMUSCULAR; SUBCUTANEOUS ONCE
Status: COMPLETED | OUTPATIENT
Start: 2019-04-15 | End: 2019-04-15

## 2019-04-15 RX ADMIN — CYANOCOBALAMIN 2000 MCG: 1000 INJECTION INTRAMUSCULAR; SUBCUTANEOUS at 18:04

## 2019-04-16 ENCOUNTER — TELEPHONE (OUTPATIENT)
Dept: FAMILY MEDICINE CLINIC | Facility: CLINIC | Age: 64
End: 2019-04-16

## 2019-04-16 DIAGNOSIS — E78.2 MIXED HYPERLIPIDEMIA: ICD-10-CM

## 2019-04-16 DIAGNOSIS — D50.8 IRON DEFICIENCY ANEMIA SECONDARY TO INADEQUATE DIETARY IRON INTAKE: ICD-10-CM

## 2019-04-16 DIAGNOSIS — E03.9 ACQUIRED HYPOTHYROIDISM: Primary | ICD-10-CM

## 2019-04-16 DIAGNOSIS — E55.9 VITAMIN D DEFICIENCY: ICD-10-CM

## 2019-04-16 DIAGNOSIS — M81.0 OSTEOPOROSIS, UNSPECIFIED OSTEOPOROSIS TYPE, UNSPECIFIED PATHOLOGICAL FRACTURE PRESENCE: ICD-10-CM

## 2019-04-16 DIAGNOSIS — I10 BENIGN ESSENTIAL HYPERTENSION: ICD-10-CM

## 2019-04-16 DIAGNOSIS — E53.8 VITAMIN B12 DEFICIENCY: ICD-10-CM

## 2019-04-16 DIAGNOSIS — R74.8 ELEVATED ALKALINE PHOSPHATASE LEVEL: ICD-10-CM

## 2019-04-16 RX ORDER — LEVOTHYROXINE SODIUM 0.12 MG/1
TABLET ORAL
Qty: 30 TABLET | Refills: 2 | Status: SHIPPED | OUTPATIENT
Start: 2019-04-16 | End: 2019-07-28 | Stop reason: SDUPTHER

## 2019-04-16 RX ORDER — SERTRALINE HYDROCHLORIDE 100 MG/1
TABLET, FILM COATED ORAL
Qty: 60 TABLET | Refills: 3 | Status: SHIPPED | OUTPATIENT
Start: 2019-04-16 | End: 2019-08-26 | Stop reason: SDUPTHER

## 2019-04-22 ENCOUNTER — OFFICE VISIT (OUTPATIENT)
Dept: URGENT CARE | Facility: CLINIC | Age: 64
End: 2019-04-22
Payer: MEDICARE

## 2019-04-22 VITALS
HEIGHT: 60 IN | TEMPERATURE: 98.6 F | BODY MASS INDEX: 28.07 KG/M2 | DIASTOLIC BLOOD PRESSURE: 83 MMHG | RESPIRATION RATE: 16 BRPM | WEIGHT: 143 LBS | SYSTOLIC BLOOD PRESSURE: 154 MMHG | HEART RATE: 71 BPM | OXYGEN SATURATION: 94 %

## 2019-04-22 DIAGNOSIS — Z23 NEED FOR TDAP VACCINATION: Primary | ICD-10-CM

## 2019-04-22 DIAGNOSIS — S61.219A LACERATION WITHOUT FOREIGN BODY OF UNSPECIFIED FINGER WITHOUT DAMAGE TO NAIL, INITIAL ENCOUNTER: ICD-10-CM

## 2019-04-22 PROCEDURE — 90471 IMMUNIZATION ADMIN: CPT | Performed by: PHYSICIAN ASSISTANT

## 2019-04-22 PROCEDURE — 90715 TDAP VACCINE 7 YRS/> IM: CPT

## 2019-04-22 PROCEDURE — G0463 HOSPITAL OUTPT CLINIC VISIT: HCPCS | Performed by: PHYSICIAN ASSISTANT

## 2019-04-22 PROCEDURE — 12001 RPR S/N/AX/GEN/TRNK 2.5CM/<: CPT | Performed by: PHYSICIAN ASSISTANT

## 2019-04-22 PROCEDURE — 99213 OFFICE O/P EST LOW 20 MIN: CPT | Performed by: PHYSICIAN ASSISTANT

## 2019-04-23 DIAGNOSIS — M51.36 DDD (DEGENERATIVE DISC DISEASE), LUMBAR: ICD-10-CM

## 2019-04-24 RX ORDER — HYDROCODONE BITARTRATE AND ACETAMINOPHEN 5; 325 MG/1; MG/1
TABLET ORAL
Qty: 30 TABLET | Refills: 0 | Status: SHIPPED | OUTPATIENT
Start: 2019-04-24 | End: 2019-06-13 | Stop reason: SDUPTHER

## 2019-05-08 RX ORDER — MONTELUKAST SODIUM 10 MG/1
TABLET ORAL DAILY
COMMUNITY
End: 2019-05-09

## 2019-05-08 RX ORDER — NALOXONE HYDROCHLORIDE 4 MG/.1ML
1 SPRAY NASAL
COMMUNITY
Start: 2016-05-04 | End: 2019-05-09

## 2019-05-08 RX ORDER — OXYCODONE HYDROCHLORIDE AND ACETAMINOPHEN 5; 325 MG/1; MG/1
TABLET ORAL
COMMUNITY
End: 2019-05-09

## 2019-05-08 RX ORDER — GABAPENTIN 100 MG/1
200 CAPSULE ORAL
COMMUNITY
Start: 2017-08-21 | End: 2019-05-09

## 2019-05-09 ENCOUNTER — OFFICE VISIT (OUTPATIENT)
Dept: FAMILY MEDICINE CLINIC | Facility: CLINIC | Age: 64
End: 2019-05-09
Payer: MEDICARE

## 2019-05-09 VITALS
RESPIRATION RATE: 16 BRPM | HEART RATE: 66 BPM | DIASTOLIC BLOOD PRESSURE: 70 MMHG | SYSTOLIC BLOOD PRESSURE: 124 MMHG | BODY MASS INDEX: 28.78 KG/M2 | HEIGHT: 60 IN | OXYGEN SATURATION: 97 % | TEMPERATURE: 98.5 F | WEIGHT: 146.6 LBS

## 2019-05-09 DIAGNOSIS — Z13.820 SCREENING FOR OSTEOPOROSIS: ICD-10-CM

## 2019-05-09 DIAGNOSIS — K58.0 IRRITABLE BOWEL SYNDROME WITH DIARRHEA: ICD-10-CM

## 2019-05-09 DIAGNOSIS — M51.9 DISC DISORDER OF LUMBAR REGION: ICD-10-CM

## 2019-05-09 DIAGNOSIS — I10 BENIGN ESSENTIAL HYPERTENSION: Primary | ICD-10-CM

## 2019-05-09 DIAGNOSIS — Z12.31 VISIT FOR SCREENING MAMMOGRAM: ICD-10-CM

## 2019-05-09 DIAGNOSIS — K21.9 GASTROESOPHAGEAL REFLUX DISEASE WITHOUT ESOPHAGITIS: ICD-10-CM

## 2019-05-09 DIAGNOSIS — I47.1 PSVT (PAROXYSMAL SUPRAVENTRICULAR TACHYCARDIA) (HCC): ICD-10-CM

## 2019-05-09 DIAGNOSIS — F41.1 GAD (GENERALIZED ANXIETY DISORDER): ICD-10-CM

## 2019-05-09 DIAGNOSIS — E03.9 HYPOTHYROIDISM, UNSPECIFIED TYPE: ICD-10-CM

## 2019-05-09 DIAGNOSIS — R74.8 ELEVATED ALKALINE PHOSPHATASE LEVEL: ICD-10-CM

## 2019-05-09 DIAGNOSIS — Z23 ENCOUNTER FOR IMMUNIZATION: ICD-10-CM

## 2019-05-09 DIAGNOSIS — D50.8 IRON DEFICIENCY ANEMIA SECONDARY TO INADEQUATE DIETARY IRON INTAKE: ICD-10-CM

## 2019-05-09 DIAGNOSIS — Z00.00 ENCOUNTER FOR MEDICARE ANNUAL WELLNESS EXAM: ICD-10-CM

## 2019-05-09 DIAGNOSIS — E78.2 MIXED HYPERLIPIDEMIA: ICD-10-CM

## 2019-05-09 PROCEDURE — G0009 ADMIN PNEUMOCOCCAL VACCINE: HCPCS

## 2019-05-09 PROCEDURE — G0439 PPPS, SUBSEQ VISIT: HCPCS | Performed by: FAMILY MEDICINE

## 2019-05-09 PROCEDURE — 90670 PCV13 VACCINE IM: CPT

## 2019-05-09 PROCEDURE — 99214 OFFICE O/P EST MOD 30 MIN: CPT | Performed by: FAMILY MEDICINE

## 2019-05-12 PROBLEM — M41.20 SCOLIOSIS (AND KYPHOSCOLIOSIS), IDIOPATHIC: Status: ACTIVE | Noted: 2019-05-12

## 2019-05-12 PROBLEM — M81.8 OTHER OSTEOPOROSIS WITHOUT CURRENT PATHOLOGICAL FRACTURE: Status: RESOLVED | Noted: 2018-04-11 | Resolved: 2019-05-12

## 2019-05-22 ENCOUNTER — CLINICAL SUPPORT (OUTPATIENT)
Dept: FAMILY MEDICINE CLINIC | Facility: CLINIC | Age: 64
End: 2019-05-22
Payer: MEDICARE

## 2019-05-22 DIAGNOSIS — E53.8 VITAMIN B12 DEFICIENCY: Primary | ICD-10-CM

## 2019-05-22 PROCEDURE — 96372 THER/PROPH/DIAG INJ SC/IM: CPT

## 2019-05-22 RX ORDER — CYANOCOBALAMIN 1000 UG/ML
1000 INJECTION INTRAMUSCULAR; SUBCUTANEOUS ONCE
Status: COMPLETED | OUTPATIENT
Start: 2019-05-22 | End: 2019-05-22

## 2019-05-22 RX ADMIN — CYANOCOBALAMIN 1000 MCG: 1000 INJECTION INTRAMUSCULAR; SUBCUTANEOUS at 14:25

## 2019-06-04 DIAGNOSIS — M51.9 DISC DISORDER OF LUMBAR REGION: ICD-10-CM

## 2019-06-04 RX ORDER — TIZANIDINE HYDROCHLORIDE 4 MG/1
CAPSULE, GELATIN COATED ORAL
Qty: 60 CAPSULE | Refills: 3 | Status: SHIPPED | OUTPATIENT
Start: 2019-06-04 | End: 2019-08-19 | Stop reason: ALTCHOICE

## 2019-06-13 DIAGNOSIS — M51.36 DDD (DEGENERATIVE DISC DISEASE), LUMBAR: ICD-10-CM

## 2019-06-14 RX ORDER — HYDROCODONE BITARTRATE AND ACETAMINOPHEN 5; 325 MG/1; MG/1
TABLET ORAL
Qty: 30 TABLET | Refills: 0 | Status: SHIPPED | OUTPATIENT
Start: 2019-06-14 | End: 2019-07-22 | Stop reason: SDUPTHER

## 2019-06-19 ENCOUNTER — CLINICAL SUPPORT (OUTPATIENT)
Dept: FAMILY MEDICINE CLINIC | Facility: CLINIC | Age: 64
End: 2019-06-19
Payer: MEDICARE

## 2019-06-19 DIAGNOSIS — E53.8 VITAMIN B12 DEFICIENCY: Primary | ICD-10-CM

## 2019-06-19 PROCEDURE — 96372 THER/PROPH/DIAG INJ SC/IM: CPT

## 2019-06-19 RX ORDER — CYANOCOBALAMIN 1000 UG/ML
1000 INJECTION INTRAMUSCULAR; SUBCUTANEOUS ONCE
Status: COMPLETED | OUTPATIENT
Start: 2019-06-19 | End: 2019-06-19

## 2019-06-19 RX ADMIN — CYANOCOBALAMIN 1000 MCG: 1000 INJECTION INTRAMUSCULAR; SUBCUTANEOUS at 16:51

## 2019-07-22 DIAGNOSIS — M51.36 DDD (DEGENERATIVE DISC DISEASE), LUMBAR: ICD-10-CM

## 2019-07-22 RX ORDER — HYDROCODONE BITARTRATE AND ACETAMINOPHEN 5; 325 MG/1; MG/1
TABLET ORAL
Qty: 30 TABLET | Refills: 0 | Status: SHIPPED | OUTPATIENT
Start: 2019-07-22 | End: 2019-08-23 | Stop reason: SDUPTHER

## 2019-07-23 ENCOUNTER — CLINICAL SUPPORT (OUTPATIENT)
Dept: FAMILY MEDICINE CLINIC | Facility: CLINIC | Age: 64
End: 2019-07-23
Payer: MEDICARE

## 2019-07-23 DIAGNOSIS — E53.8 VITAMIN B12 DEFICIENCY: Primary | ICD-10-CM

## 2019-07-23 PROCEDURE — 96372 THER/PROPH/DIAG INJ SC/IM: CPT

## 2019-07-23 RX ORDER — CYANOCOBALAMIN 1000 UG/ML
1000 INJECTION INTRAMUSCULAR; SUBCUTANEOUS ONCE
Status: COMPLETED | OUTPATIENT
Start: 2019-07-23 | End: 2019-07-23

## 2019-07-23 RX ADMIN — CYANOCOBALAMIN 1000 MCG: 1000 INJECTION INTRAMUSCULAR; SUBCUTANEOUS at 14:32

## 2019-07-28 DIAGNOSIS — E03.9 HYPOTHYROIDISM, UNSPECIFIED TYPE: ICD-10-CM

## 2019-07-28 RX ORDER — LEVOTHYROXINE SODIUM 0.12 MG/1
TABLET ORAL
Qty: 30 TABLET | Refills: 2 | Status: SHIPPED | OUTPATIENT
Start: 2019-07-28 | End: 2019-10-18 | Stop reason: SDUPTHER

## 2019-08-19 DIAGNOSIS — M51.9 DISC DISORDER OF LUMBAR REGION: Primary | ICD-10-CM

## 2019-08-19 RX ORDER — TIZANIDINE 4 MG/1
8 TABLET ORAL
Qty: 60 TABLET | Refills: 0 | Status: SHIPPED | OUTPATIENT
Start: 2019-08-19 | End: 2019-09-18 | Stop reason: SDUPTHER

## 2019-08-23 ENCOUNTER — CLINICAL SUPPORT (OUTPATIENT)
Dept: FAMILY MEDICINE CLINIC | Facility: CLINIC | Age: 64
End: 2019-08-23
Payer: MEDICARE

## 2019-08-23 DIAGNOSIS — M51.36 DDD (DEGENERATIVE DISC DISEASE), LUMBAR: ICD-10-CM

## 2019-08-23 DIAGNOSIS — E53.8 VITAMIN B12 DEFICIENCY: Primary | ICD-10-CM

## 2019-08-23 PROCEDURE — 96372 THER/PROPH/DIAG INJ SC/IM: CPT

## 2019-08-23 RX ORDER — HYDROCODONE BITARTRATE AND ACETAMINOPHEN 5; 325 MG/1; MG/1
TABLET ORAL
Qty: 30 TABLET | Refills: 0 | Status: SHIPPED | OUTPATIENT
Start: 2019-08-23 | End: 2019-10-08 | Stop reason: SDUPTHER

## 2019-08-23 RX ORDER — CYANOCOBALAMIN 1000 UG/ML
1000 INJECTION INTRAMUSCULAR; SUBCUTANEOUS ONCE
Status: COMPLETED | OUTPATIENT
Start: 2019-08-23 | End: 2019-08-23

## 2019-08-23 RX ADMIN — CYANOCOBALAMIN 1000 MCG: 1000 INJECTION INTRAMUSCULAR; SUBCUTANEOUS at 08:18

## 2019-08-26 DIAGNOSIS — I10 BENIGN ESSENTIAL HYPERTENSION: ICD-10-CM

## 2019-08-26 DIAGNOSIS — F41.1 GAD (GENERALIZED ANXIETY DISORDER): ICD-10-CM

## 2019-08-26 RX ORDER — SERTRALINE HYDROCHLORIDE 100 MG/1
TABLET, FILM COATED ORAL
Qty: 60 TABLET | Refills: 3 | Status: SHIPPED | OUTPATIENT
Start: 2019-08-26 | End: 2019-12-23 | Stop reason: SDUPTHER

## 2019-08-26 RX ORDER — AMLODIPINE BESYLATE 5 MG/1
TABLET ORAL
Qty: 30 TABLET | Refills: 5 | Status: SHIPPED | OUTPATIENT
Start: 2019-08-26 | End: 2020-01-15

## 2019-09-17 DIAGNOSIS — F41.1 GAD (GENERALIZED ANXIETY DISORDER): ICD-10-CM

## 2019-09-18 DIAGNOSIS — M51.9 DISC DISORDER OF LUMBAR REGION: ICD-10-CM

## 2019-09-18 RX ORDER — CLONAZEPAM 1 MG/1
0.5 TABLET ORAL DAILY
Qty: 30 TABLET | Refills: 5 | Status: SHIPPED | OUTPATIENT
Start: 2019-09-18 | End: 2020-03-17 | Stop reason: SDUPTHER

## 2019-09-18 RX ORDER — TIZANIDINE 4 MG/1
TABLET ORAL
Qty: 60 TABLET | Refills: 3 | Status: SHIPPED | OUTPATIENT
Start: 2019-09-18 | End: 2020-02-20

## 2019-09-19 DIAGNOSIS — E78.2 MIXED HYPERLIPIDEMIA: Primary | ICD-10-CM

## 2019-09-20 RX ORDER — ATORVASTATIN CALCIUM 40 MG/1
40 TABLET, FILM COATED ORAL 3 TIMES WEEKLY
Qty: 12 TABLET | Refills: 1 | Status: SHIPPED | OUTPATIENT
Start: 2019-09-20 | End: 2019-11-26 | Stop reason: SDUPTHER

## 2019-09-23 ENCOUNTER — CLINICAL SUPPORT (OUTPATIENT)
Dept: FAMILY MEDICINE CLINIC | Facility: CLINIC | Age: 64
End: 2019-09-23
Payer: MEDICARE

## 2019-09-23 DIAGNOSIS — E53.8 VITAMIN B12 DEFICIENCY: Primary | ICD-10-CM

## 2019-09-23 PROCEDURE — 96372 THER/PROPH/DIAG INJ SC/IM: CPT

## 2019-09-23 RX ORDER — CYANOCOBALAMIN 1000 UG/ML
1000 INJECTION INTRAMUSCULAR; SUBCUTANEOUS
Status: SHIPPED | OUTPATIENT
Start: 2019-09-23

## 2019-09-23 RX ADMIN — CYANOCOBALAMIN 1000 MCG: 1000 INJECTION INTRAMUSCULAR; SUBCUTANEOUS at 17:39

## 2019-09-29 DIAGNOSIS — M51.9 DISC DISORDER OF LUMBAR REGION: ICD-10-CM

## 2019-09-29 RX ORDER — TIZANIDINE HYDROCHLORIDE 4 MG/1
CAPSULE, GELATIN COATED ORAL
Qty: 60 CAPSULE | Refills: 3 | OUTPATIENT
Start: 2019-09-29

## 2019-10-08 DIAGNOSIS — M51.36 DDD (DEGENERATIVE DISC DISEASE), LUMBAR: ICD-10-CM

## 2019-10-09 RX ORDER — HYDROCODONE BITARTRATE AND ACETAMINOPHEN 5; 325 MG/1; MG/1
TABLET ORAL
Qty: 30 TABLET | Refills: 0 | Status: SHIPPED | OUTPATIENT
Start: 2019-10-09 | End: 2019-11-15 | Stop reason: SDUPTHER

## 2019-10-18 DIAGNOSIS — E03.9 HYPOTHYROIDISM, UNSPECIFIED TYPE: ICD-10-CM

## 2019-10-18 RX ORDER — LEVOTHYROXINE SODIUM 0.12 MG/1
TABLET ORAL
Qty: 30 TABLET | Refills: 0 | Status: SHIPPED | OUTPATIENT
Start: 2019-10-18 | End: 2019-11-12 | Stop reason: SDUPTHER

## 2019-10-18 NOTE — TELEPHONE ENCOUNTER
Please contact patient  I refilled her levothyroxine  She has not had blood work done since July of 2018  I did place new blood work orders for her in April of this year, please strongly advised patient to proceed    Thank you

## 2019-10-29 ENCOUNTER — CLINICAL SUPPORT (OUTPATIENT)
Dept: FAMILY MEDICINE CLINIC | Facility: CLINIC | Age: 64
End: 2019-10-29
Payer: MEDICARE

## 2019-10-29 DIAGNOSIS — E53.8 VITAMIN B12 DEFICIENCY: Primary | ICD-10-CM

## 2019-10-29 PROCEDURE — 96372 THER/PROPH/DIAG INJ SC/IM: CPT

## 2019-10-29 RX ORDER — CYANOCOBALAMIN 1000 UG/ML
1000 INJECTION INTRAMUSCULAR; SUBCUTANEOUS
Status: SHIPPED | OUTPATIENT
Start: 2019-10-29

## 2019-10-29 RX ADMIN — CYANOCOBALAMIN 1000 MCG: 1000 INJECTION INTRAMUSCULAR; SUBCUTANEOUS at 20:10

## 2019-11-12 DIAGNOSIS — E03.9 HYPOTHYROIDISM, UNSPECIFIED TYPE: ICD-10-CM

## 2019-11-12 RX ORDER — LEVOTHYROXINE SODIUM 0.12 MG/1
TABLET ORAL
Qty: 30 TABLET | Refills: 0 | Status: SHIPPED | OUTPATIENT
Start: 2019-11-12 | End: 2019-12-15 | Stop reason: SDUPTHER

## 2019-11-12 NOTE — TELEPHONE ENCOUNTER
Please contact patient  I just refilled her levothyroxine again  She has not had blood work since 2018  She needs to proceed ASAP  Please see telephone note from October  Please either reach her over the phone or mail her a letter    Thank you

## 2019-11-15 DIAGNOSIS — M51.36 DDD (DEGENERATIVE DISC DISEASE), LUMBAR: ICD-10-CM

## 2019-11-17 RX ORDER — HYDROCODONE BITARTRATE AND ACETAMINOPHEN 5; 325 MG/1; MG/1
TABLET ORAL
Qty: 30 TABLET | Refills: 0 | Status: SHIPPED | OUTPATIENT
Start: 2019-11-17 | End: 2019-12-23 | Stop reason: SDUPTHER

## 2019-11-21 RX ORDER — SIMVASTATIN 20 MG
TABLET ORAL DAILY
COMMUNITY
End: 2019-11-22 | Stop reason: ALTCHOICE

## 2019-11-21 RX ORDER — GABAPENTIN 600 MG/1
TABLET ORAL
COMMUNITY
End: 2019-11-22 | Stop reason: ALTCHOICE

## 2019-11-21 RX ORDER — DICYCLOMINE HYDROCHLORIDE 10 MG/1
CAPSULE ORAL
COMMUNITY
End: 2019-11-22 | Stop reason: ALTCHOICE

## 2019-11-22 ENCOUNTER — OFFICE VISIT (OUTPATIENT)
Dept: FAMILY MEDICINE CLINIC | Facility: CLINIC | Age: 64
End: 2019-11-22
Payer: MEDICARE

## 2019-11-22 ENCOUNTER — APPOINTMENT (OUTPATIENT)
Dept: LAB | Facility: CLINIC | Age: 64
End: 2019-11-22
Payer: MEDICARE

## 2019-11-22 VITALS
TEMPERATURE: 97.7 F | DIASTOLIC BLOOD PRESSURE: 78 MMHG | OXYGEN SATURATION: 97 % | RESPIRATION RATE: 16 BRPM | WEIGHT: 153.2 LBS | HEIGHT: 60 IN | HEART RATE: 62 BPM | BODY MASS INDEX: 30.08 KG/M2 | SYSTOLIC BLOOD PRESSURE: 130 MMHG

## 2019-11-22 DIAGNOSIS — R74.8 ELEVATED ALKALINE PHOSPHATASE LEVEL: ICD-10-CM

## 2019-11-22 DIAGNOSIS — D50.8 IRON DEFICIENCY ANEMIA SECONDARY TO INADEQUATE DIETARY IRON INTAKE: ICD-10-CM

## 2019-11-22 DIAGNOSIS — D51.0 PERNICIOUS ANEMIA: ICD-10-CM

## 2019-11-22 DIAGNOSIS — M41.20 SCOLIOSIS (AND KYPHOSCOLIOSIS), IDIOPATHIC: ICD-10-CM

## 2019-11-22 DIAGNOSIS — E53.8 VITAMIN B12 DEFICIENCY: ICD-10-CM

## 2019-11-22 DIAGNOSIS — E03.9 HYPOTHYROIDISM, UNSPECIFIED TYPE: ICD-10-CM

## 2019-11-22 DIAGNOSIS — K21.9 GASTROESOPHAGEAL REFLUX DISEASE WITHOUT ESOPHAGITIS: ICD-10-CM

## 2019-11-22 DIAGNOSIS — E78.2 MIXED HYPERLIPIDEMIA: ICD-10-CM

## 2019-11-22 DIAGNOSIS — E03.9 ACQUIRED HYPOTHYROIDISM: ICD-10-CM

## 2019-11-22 DIAGNOSIS — F41.8 DEPRESSION WITH ANXIETY: ICD-10-CM

## 2019-11-22 DIAGNOSIS — E55.9 VITAMIN D DEFICIENCY: ICD-10-CM

## 2019-11-22 DIAGNOSIS — I47.1 PSVT (PAROXYSMAL SUPRAVENTRICULAR TACHYCARDIA) (HCC): ICD-10-CM

## 2019-11-22 DIAGNOSIS — I10 BENIGN ESSENTIAL HYPERTENSION: Primary | ICD-10-CM

## 2019-11-22 DIAGNOSIS — I10 BENIGN ESSENTIAL HYPERTENSION: ICD-10-CM

## 2019-11-22 LAB
25(OH)D3 SERPL-MCNC: 41.8 NG/ML (ref 30–100)
ALBUMIN SERPL BCP-MCNC: 4.4 G/DL (ref 3.5–5)
ALP SERPL-CCNC: 94 U/L (ref 46–116)
ALT SERPL W P-5'-P-CCNC: 34 U/L (ref 12–78)
ANION GAP SERPL CALCULATED.3IONS-SCNC: 2 MMOL/L (ref 4–13)
AST SERPL W P-5'-P-CCNC: 24 U/L (ref 5–45)
BILIRUB SERPL-MCNC: 0.45 MG/DL (ref 0.2–1)
BUN SERPL-MCNC: 21 MG/DL (ref 5–25)
CALCIUM SERPL-MCNC: 9.5 MG/DL (ref 8.3–10.1)
CHLORIDE SERPL-SCNC: 104 MMOL/L (ref 100–108)
CHOLEST SERPL-MCNC: 198 MG/DL (ref 50–200)
CO2 SERPL-SCNC: 32 MMOL/L (ref 21–32)
CREAT SERPL-MCNC: 0.69 MG/DL (ref 0.6–1.3)
ERYTHROCYTE [DISTWIDTH] IN BLOOD BY AUTOMATED COUNT: 12.8 % (ref 11.6–15.1)
GFR SERPL CREATININE-BSD FRML MDRD: 92 ML/MIN/1.73SQ M
GLUCOSE P FAST SERPL-MCNC: 105 MG/DL (ref 65–99)
HCT VFR BLD AUTO: 39.7 % (ref 34.8–46.1)
HDLC SERPL-MCNC: 52 MG/DL
HGB BLD-MCNC: 13.1 G/DL (ref 11.5–15.4)
IRON SERPL-MCNC: 91 UG/DL (ref 50–170)
LDLC SERPL CALC-MCNC: 115 MG/DL (ref 0–100)
MCH RBC QN AUTO: 30.7 PG (ref 26.8–34.3)
MCHC RBC AUTO-ENTMCNC: 33 G/DL (ref 31.4–37.4)
MCV RBC AUTO: 93 FL (ref 82–98)
NONHDLC SERPL-MCNC: 146 MG/DL
PLATELET # BLD AUTO: 196 THOUSANDS/UL (ref 149–390)
PMV BLD AUTO: 9.6 FL (ref 8.9–12.7)
POTASSIUM SERPL-SCNC: 4.5 MMOL/L (ref 3.5–5.3)
PROT SERPL-MCNC: 8.2 G/DL (ref 6.4–8.2)
RBC # BLD AUTO: 4.27 MILLION/UL (ref 3.81–5.12)
SODIUM SERPL-SCNC: 138 MMOL/L (ref 136–145)
TRIGL SERPL-MCNC: 154 MG/DL
TSH SERPL DL<=0.05 MIU/L-ACNC: 1.26 UIU/ML (ref 0.36–3.74)
VIT B12 SERPL-MCNC: 522 PG/ML (ref 100–900)
WBC # BLD AUTO: 6.69 THOUSAND/UL (ref 4.31–10.16)

## 2019-11-22 PROCEDURE — 80061 LIPID PANEL: CPT

## 2019-11-22 PROCEDURE — 80053 COMPREHEN METABOLIC PANEL: CPT

## 2019-11-22 PROCEDURE — 99214 OFFICE O/P EST MOD 30 MIN: CPT | Performed by: FAMILY MEDICINE

## 2019-11-22 PROCEDURE — 83540 ASSAY OF IRON: CPT

## 2019-11-22 PROCEDURE — 82607 VITAMIN B-12: CPT

## 2019-11-22 PROCEDURE — 85027 COMPLETE CBC AUTOMATED: CPT

## 2019-11-22 PROCEDURE — 84443 ASSAY THYROID STIM HORMONE: CPT

## 2019-11-22 PROCEDURE — 36415 COLL VENOUS BLD VENIPUNCTURE: CPT

## 2019-11-22 PROCEDURE — 82306 VITAMIN D 25 HYDROXY: CPT

## 2019-11-22 NOTE — PROGRESS NOTES
FAMILY PRACTICE OFFICE VISIT       NAME: Consuelo Rodas  AGE: 71 y o  SEX: female       : 1955        MRN: [id number]        Assessment and Plan     Problem List Items Addressed This Visit        Digestive    Gastroesophageal reflux disease without esophagitis     · Symptoms are well controlled with p r n  Protonix            Endocrine    Hypothyroidism     · History of thyroid nodules, advised patient to proceed with thyroid ultrasound  · Continue levothyroxine 125 mcg daily  · Monitor TSH            Cardiovascular and Mediastinum    Benign essential hypertension - Primary     · Blood pressure is well controlled on regimen of Bystolic 20 mg daily and Norvasc 5 mg daily         PSVT (paroxysmal supraventricular tachycardia) (HCC)     · LV cardiology  Follows  ·  On Bystolic 20 mg daily            Musculoskeletal and Integument    Scoliosis (and kyphoscoliosis), idiopathic     · Chronic severe low back/spine pain due to scoliosis and multilevel arthritic disc disease/stenosis  · Patient uses Vicodin very sparingly as needed, no history of misuse  · She remains on regimen of Skelaxin daytime and tizanidine nighttime            Other    Pernicious anemia     Vitamin B12 injections every month         Elevated alkaline phosphatase level     · Mild asymptomatic elevation noted on previous blood work  · Patient denies symptoms of abdominal pain or dyspepsia  · Pending re-evaluation and will proceed with right upper quadrant ultrasound if still elevated         Depression with anxiety     · Symptoms are well controlled on Zoloft 200 mg daily and Klonopin 0 5 mg daily         Mixed hyperlipidemia     · [facility]iology follows, currently on Lipitor 40 mg 3 days per week            Patient presents for follow-up  She will be proceeding with complete blood work today  Patient declines flu vaccine    I reminded her to proceed with health maintenance screenings including mammogram, gyn exam, bone density scan as well as thyroid and liver ultrasound  Will continue same daily medications for chronic medical conditions  Patient remains under care of [facility] Cardiology  Patient declines flu vaccine today  Follow up pending labs, diagnostic results and in 6 months  Patient Instructions   Please contact  by [facility] Central scheduling to schedule your mammogram, bone density scan, liver ultrasound and thyroid ultrasound [phone]      Discussed with the patient and all questioned fully answered  She will call me if any problems arise  M*Modal software was used to dictate this note  It may contain errors with dictating incorrect words/spelling  Please contact provider directly with any questions  Chief Complaint     Chief Complaint   Patient presents with    Follow-up     Pt is here for 3 mos f/u   Needs blood work       History of Present Illness     Patient presents for follow-up of chronic medical conditions  She remains under care of Cardiology for treatment of hypertension and PSVT  Patient reports that her insurance has declined Bystolic and she has been off medication for over a months with worsening of palpitations symptoms  She is back on Bystolic 5 mg daily as of now and feels well  Patient denies symptoms of chest pain, dyspnea, leg edema or palpitations  She remains on Lipitor 40 mg 3 days per week for hyperlipidemia  Patient is past due mammogram and Pap smear  Pending thyroid ultrasound, right upper quadrant ultrasound mammogram and DEXA scan, strongly advised her to proceed  Chronic arthritic back pain  Chronic polyarticular arthralgias, osteoarthritis  Patient uses Vicodin on a as needed basis, sparingly, no history of misuse  She remains on regimen of tizanidine for chronic muscle spasm  Patient is up-to-date with colonoscopy screening, last study 2013    Patient is following healthy lifestyle, she is active, enjoys her 2 new puppies, walks quite a bit     Symptoms of depression anxiety are well controlled on Zoloft 200 mg daily and Klonopin p r n                   Review of Systems   Review of Systems   Constitutional: Negative  HENT: Negative  Eyes: Negative  Respiratory: Negative  Cardiovascular: Negative  Gastrointestinal: Negative  Endocrine: Negative  Genitourinary: Negative  Musculoskeletal: Positive for arthralgias and back pain  Skin: Negative  Allergic/Immunologic: Negative  Neurological: Negative  Psychiatric/Behavioral: The patient is nervous/anxious  Active Problem List     Patient Active Problem List   Diagnosis    Benign essential hypertension    Hypothyroidism    Lumbosacral radiculitis    Pernicious anemia    Cervical stenosis of spinal canal    Disc disorder of lumbar region    Gastroesophageal reflux disease without esophagitis    Seasonal allergic rhinitis due to pollen    PSVT (paroxysmal supraventricular tachycardia) (HCC)    Iron deficiency anemia secondary to inadequate dietary iron intake    Irritable bowel syndrome with diarrhea    Elevated alkaline phosphatase level    Depression with anxiety    Mixed hyperlipidemia    Scoliosis (and kyphoscoliosis), idiopathic       Past Medical History:  Past Medical History:   Diagnosis Date    Anxiety and depression     Bleeding ulcer     Disease of thyroid gland     GERD (gastroesophageal reflux disease)     Scoliosis     Spinal meningioma ([address] )        Past Surgical History:  Past Surgical History:   Procedure Laterality Date    SPINAL CORD STIMULATOR IMPLANT      SPINE SURGERY         Family History:  Family History   Problem Relation Age of Onset    Heart disease Mother     Esophageal cancer Father     Kidney cancer Father     Thyroid disease Father     Diabetes Son        Social History:  Social History     Socioeconomic History    Marital status:       Spouse name: Not on file    Number of children: Not on file    Years of education: Not on file    Highest education level: Not on file   Occupational History    Not on file   Social Needs    Financial resource strain: Not on file    Food insecurity:     Worry: Not on file     Inability: Not on file    Transportation needs:     Medical: Not on file     Non-medical: Not on file   Tobacco Use    Smoking status: Never Smoker    Smokeless tobacco: Never Used   Substance and Sexual Activity    Alcohol use: Yes     Comment: rare    Drug use: No    Sexual activity: Not on file   Lifestyle    Physical activity:     Days per week: Not on file     Minutes per session: Not on file    Stress: Not on file   Relationships    Social connections:     Talks on phone: Not on file     Gets together: Not on file     Attends Yarsanism service: Not on file     Active member of club or organization: Not on file     Attends meetings of clubs or organizations: Not on file     Relationship status: Not on file    Intimate partner violence:     Fear of current or ex partner: Not on file     Emotionally abused: Not on file     Physically abused: Not on file     Forced sexual activity: Not on file   Other Topics Concern    Not on file   Social History Narrative        Retired    2 adult sons    Grandchildren       Objective     Vitals:    11/22/19 1103   BP: 130/78   Pulse: 62   Resp: 16   Temp: 97 7 °F (36 5 °C)   TempSrc: Tympanic   SpO2: 97%   Weight: 69 5 kg (153 lb 3 2 oz)   Height: 5' (1 524 m)     Wt Readings from Last 3 Encounters:   11/22/19 69 5 kg (153 lb 3 2 oz)   05/09/19 66 5 kg (146 lb 9 6 oz)   04/22/19 64 9 kg (143 lb)       Physical Exam   Constitutional: She is oriented to person, place, and time  She appears well-developed and well-nourished  HENT:   Head: Normocephalic and atraumatic  Eyes: Conjunctivae are normal    Neck: Neck supple  Carotid bruit is not present  No thyromegaly present  Cardiovascular: Normal rate, regular rhythm and normal heart sounds  No murmur heard  Pulmonary/Chest: Effort normal and breath sounds normal  No respiratory distress  She has no wheezes  Abdominal: Bowel sounds are normal  She exhibits no abdominal bruit  Musculoskeletal: Normal range of motion  She exhibits no edema  Neurological: She is alert and oriented to person, place, and time  No cranial nerve deficit  Coordination normal    Psychiatric: She has a normal mood and affect  Her behavior is normal    Nursing note and vitals reviewed  Pertinent Laboratory/Diagnostic Studies:  Lab Results   Component Value Date    BUN 21 11/22/2019    CREATININE 0 69 11/22/2019    CALCIUM 9 5 11/22/2019    K 4 5 11/22/2019    CO2 32 11/22/2019     11/22/2019     Lab Results   Component Value Date    ALT 34 11/22/2019    AST 24 11/22/2019    ALKPHOS 94 11/22/2019       Lab Results   Component Value Date    WBC 6 69 11/22/2019    HGB 13 1 11/22/2019    HCT 39 7 11/22/2019    MCV 93 11/22/2019     11/22/2019       No results found for: TSH    No results found for: CHOL  Lab Results   Component Value Date    TRIG 154 (H) 11/22/2019     Lab Results   Component Value Date    HDL 52 11/22/2019     Lab Results   Component Value Date    LDLCALC 115 (H) 11/22/2019     No results found for: HGBA1C    Results for orders placed or performed in visit on 05/09/19   Hepatitis C antibody   Result Value Ref Range    HEP C AB negative        No orders of the defined types were placed in this encounter        ALLERGIES:  Allergies   Allergen Reactions    Acetaminophen     Cephalosporins Other (See Comments)     GI Upset (vomit/diarrhea)    Medical Tape Rash    Metformin      Other reaction(s): Nausea and/or vomiting    Nabumetone GI Intolerance    Other GI Intolerance    Anesthesia S-I-40 [Propofol]     Celecoxib     Cephalexin     Choline Magnesium Trisalicylate     Diclofenac     Diclofenac Potassium GI Intolerance    Diclofenac Sodium GI Intolerance    Fentanyl     Propoxyphene Other (See Comments)     unknown    Sulfa Antibiotics     Sulfacetamide     Zolpidem        Current Medications     Current Outpatient Medications   Medication Sig Dispense Refill    amLODIPine (NORVASC) 5 mg tablet TAKE 1 TABLET BY MOUTH EVERY DAY 30 tablet 5    atorvastatin (LIPITOR) 40 mg tablet Take 1 tablet (40 mg total) by mouth 3 (three) times a week 12 tablet 1    Calcium Carbonate-Vitamin D (CALCIUM CREAMIES PO) Take 600 mg by mouth daily      Cholecalciferol ([employer] VITAMIN D3) 1000 units capsule Take 2,000 Units by mouth      clonazePAM (KlonoPIN) 1 mg tablet Take 0 5 tablets (0 5 mg total) by mouth daily 30 tablet 5    Cyanocobalamin 1000 MCG/ML LIQD cyanocobalamin (vit B-12) 1,000 mcg/mL injection solution      ferrous sulfate 325 (65 Fe) mg tablet Take by oral route        fluticasone (FLONASE) 50 mcg/act nasal spray USE 2 SPRAYS IN EACH NOSTRIL EVERY DAY 3 Bottle 3    HYDROcodone-acetaminophen (NORCO) 5-325 mg per tablet Take 1 tablet 3 times a day as needed for severe low back pain (Patient taking differently: 1 tablet daily Take 1 tablet 3 times a day as needed for severe low back pain) 30 tablet 0    levothyroxine 125 mcg tablet TAKE 1 TABLET BY MOUTH EVERY DAY 30 tablet 0    lidocaine (LIDODERM) 5 % Apply 1-2 patches daily as needed, remove in 12 hours 60 patch 5    metaxalone (SKELAXIN) 800 mg tablet Take 1 tablet (800 mg total) by mouth 2 (two) times a day as needed for muscle spasms 60 tablet 2    nebivolol (BYSTOLIC) 10 mg tablet Take 10 mg by mouth daily        omega-3-acid ethyl esters (LOVAZA) 1 g capsule Take 2 g by mouth daily      pantoprazole (PROTONIX) 40 mg tablet Take 1 tablet (40 mg total) by mouth daily (Patient taking differently: Take 40 mg by mouth as needed ) 30 tablet 1    sertraline (ZOLOFT) 100 mg tablet TAKE 2 TABLETS BY MOUTH EVERY DAY 60 tablet 3    tiZANidine (ZANAFLEX) 4 mg tablet TAKE 2 TABLETS BY MOUTH AT BEDTIME AS NEEDED FOR MUSCLE SPASMS 60 tablet 3     Current Facility-Administered Medications   Medication Dose Route Frequency Provider Last Rate Last Dose    cyanocobalamin injection 1,000 mcg  1,000 mcg Intramuscular Q30 Days Baldemar No MD   1,000 mcg at 02/21/18 1534    cyanocobalamin injection 1,000 mcg  1,000 mcg Intramuscular Q30 Days Baldemar No MD   1,000 mcg at 03/29/18 0989    cyanocobalamin injection 1,000 mcg  1,000 mcg Intramuscular Q30 Days Baldemar No MD   1,000 mcg at 04/30/18 1737    cyanocobalamin injection 1,000 mcg  1,000 mcg Intramuscular Y59 Days Lanie Lim MD   0,899 mcg at 08/10/18 1017    cyanocobalamin injection 1,000 mcg  1,000 mcg Intramuscular Q30 Days Baldemar No MD   1,000 mcg at 09/10/18 1127    cyanocobalamin injection 1,000 mcg  1,000 mcg Intramuscular Q30 Days Baldemar No MD   1,000 mcg at 02/11/19 3009    cyanocobalamin injection 1,000 mcg  1,000 mcg Intramuscular Q30 Days Baldemar No MD   1,000 mcg at 09/23/19 1739    cyanocobalamin injection 1,000 mcg  1,000 mcg Intramuscular Q30 Days Baldemar No MD   1,000 mcg at 10/29/19 2010       Medications Discontinued During This Encounter   Medication Reason    dicyclomine (BENTYL) 10 mg capsule Therapy completed    gabapentin (NEURONTIN) 600 MG tablet Therapy completed    RaNITidine HCl (ZANTAC 75 PO) Alternate therapy    simvastatin (ZOCOR) 20 mg tablet Alternate therapy       Health Maintenance     Health Maintenance   Topic Date Due    MAMMOGRAM  1955    DXA SCAN  1955    HIV Screening  04/09/1969    Cervical Cancer Screening  04/09/1975    Falls: Plan of Care  04/09/2019    Influenza Vaccine  11/22/2020 (Originally 7/1/2019)    Fall Risk  05/09/2020    Depression Screening PHQ  05/09/2020    Urinary Incontinence Screening  05/09/2020    Medicare Annual Wellness Visit (AWV)  05/09/2020    Pneumococcal Vaccine: 65+ Years (2 of 2 - PPSV23) 05/09/2020    BMI: Followup Plan  05/12/2020    BMI: Adult  11/22/2020    CRC Screening: Colonoscopy  08/28/2023    DTaP,Tdap,and Td Vaccines (3 - Td) 04/22/2029    Hepatitis C Screening  Completed    Pneumococcal Vaccine: Pediatrics (0 to 5 Years) and At-Risk Patients (6 to 59 Years)  Aged Out    HIB Vaccine  Aged Out    Hepatitis B Vaccine  Aged Out    IPV Vaccine  Aged Out    Hepatitis A Vaccine  Aged Out    Meningococcal ACWY Vaccine  Aged Out    HPV Vaccine  Aged [employer] History   Administered Date(s) Administered    INFLUENZA 10/08/2007, 10/24/2008, 10/13/2009, 10/17/2011, 10/13/2012, 10/21/2013, 11/05/2014, 10/02/2015, 10/13/2016, 10/10/2017, 10/10/2017    Influenza, recombinant, quadrivalent,injectable, preservative free 10/10/2018    Pneumococcal Conjugate 13-Valent 05/09/2019    Td (adult), Unspecified 08/12/1993    Tdap 02/17/2008, 04/22/2019    Zoster 04/29/2014       Ekaterina Rodriguez MD

## 2019-11-22 NOTE — PATIENT INSTRUCTIONS
Please contact  by [facility]'s Central scheduling to schedule your mammogram, bone density scan, liver ultrasound and thyroid ultrasound [phone]

## 2019-11-26 DIAGNOSIS — E78.2 MIXED HYPERLIPIDEMIA: ICD-10-CM

## 2019-11-27 ENCOUNTER — TELEPHONE (OUTPATIENT)
Dept: FAMILY MEDICINE CLINIC | Facility: CLINIC | Age: 64
End: 2019-11-27

## 2019-11-27 PROBLEM — F41.8 DEPRESSION WITH ANXIETY: Status: ACTIVE | Noted: 2018-05-25

## 2019-11-27 RX ORDER — ATORVASTATIN CALCIUM 40 MG/1
TABLET, FILM COATED ORAL
Qty: 18 TABLET | Refills: 3 | Status: SHIPPED | OUTPATIENT
Start: 2019-11-27 | End: 2020-01-15

## 2019-11-27 NOTE — TELEPHONE ENCOUNTER
Please contact patient  All her blood work was normal aside from borderline cholesterol  Her total cholesterol was 198 with LDL of 115  Her liver testing is actually back to normal which is great news  I would advise to increase dose of Lipitor from 40 mg 3 days a week to 40 mg 4 days a week  Since patient's liver testing is normal, she may hold off liver ultrasound but please proceed with thyroid ultrasound, mammogram and bone density scan as we have discussed    Thank you

## 2019-11-27 NOTE — ASSESSMENT & PLAN NOTE
· History of thyroid nodules, advised patient to proceed with thyroid ultrasound  · Continue levothyroxine 125 mcg daily    · Monitor TSH

## 2019-11-27 NOTE — ASSESSMENT & PLAN NOTE
· Mild asymptomatic elevation noted on previous blood work  · Patient denies symptoms of abdominal pain or dyspepsia    · Pending re-evaluation and will proceed with right upper quadrant ultrasound if still elevated

## 2019-11-27 NOTE — ASSESSMENT & PLAN NOTE
· [facility] Cardiology follows, currently on Lipitor 40 mg 3 days per week

## 2019-12-06 ENCOUNTER — CLINICAL SUPPORT (OUTPATIENT)
Dept: FAMILY MEDICINE CLINIC | Facility: CLINIC | Age: 64
End: 2019-12-06
Payer: MEDICARE

## 2019-12-06 DIAGNOSIS — E53.8 VITAMIN B12 DEFICIENCY: Primary | ICD-10-CM

## 2019-12-06 RX ORDER — CYANOCOBALAMIN 1000 UG/ML
1000 INJECTION INTRAMUSCULAR; SUBCUTANEOUS
Status: SHIPPED | OUTPATIENT
Start: 2019-12-06

## 2019-12-06 RX ADMIN — CYANOCOBALAMIN 1000 MCG: 1000 INJECTION INTRAMUSCULAR; SUBCUTANEOUS at 15:13

## 2019-12-15 DIAGNOSIS — E03.9 HYPOTHYROIDISM, UNSPECIFIED TYPE: ICD-10-CM

## 2019-12-16 RX ORDER — LEVOTHYROXINE SODIUM 0.12 MG/1
TABLET ORAL
Qty: 30 TABLET | Refills: 5 | Status: SHIPPED | OUTPATIENT
Start: 2019-12-16 | End: 2020-04-18

## 2019-12-23 DIAGNOSIS — F41.1 GAD (GENERALIZED ANXIETY DISORDER): ICD-10-CM

## 2019-12-23 DIAGNOSIS — M51.36 DDD (DEGENERATIVE DISC DISEASE), LUMBAR: ICD-10-CM

## 2019-12-23 RX ORDER — SERTRALINE HYDROCHLORIDE 100 MG/1
TABLET, FILM COATED ORAL
Qty: 60 TABLET | Refills: 5 | Status: SHIPPED | OUTPATIENT
Start: 2019-12-23 | End: 2020-04-17

## 2019-12-23 RX ORDER — HYDROCODONE BITARTRATE AND ACETAMINOPHEN 5; 325 MG/1; MG/1
TABLET ORAL
Qty: 30 TABLET | Refills: 0 | Status: SHIPPED | OUTPATIENT
Start: 2019-12-23 | End: 2020-01-31 | Stop reason: SDUPTHER

## 2020-01-07 ENCOUNTER — CLINICAL SUPPORT (OUTPATIENT)
Dept: FAMILY MEDICINE CLINIC | Facility: CLINIC | Age: 65
End: 2020-01-07
Payer: MEDICARE

## 2020-01-07 DIAGNOSIS — E53.8 VITAMIN B 12 DEFICIENCY: Primary | ICD-10-CM

## 2020-01-07 PROCEDURE — 96372 THER/PROPH/DIAG INJ SC/IM: CPT

## 2020-01-07 RX ORDER — CYANOCOBALAMIN 1000 UG/ML
1000 INJECTION INTRAMUSCULAR; SUBCUTANEOUS ONCE
Status: COMPLETED | OUTPATIENT
Start: 2020-01-07 | End: 2020-01-07

## 2020-01-07 RX ADMIN — CYANOCOBALAMIN 1000 MCG: 1000 INJECTION INTRAMUSCULAR; SUBCUTANEOUS at 17:42

## 2020-01-15 DIAGNOSIS — I10 BENIGN ESSENTIAL HYPERTENSION: ICD-10-CM

## 2020-01-15 DIAGNOSIS — E78.2 MIXED HYPERLIPIDEMIA: ICD-10-CM

## 2020-01-15 RX ORDER — ATORVASTATIN CALCIUM 40 MG/1
TABLET, FILM COATED ORAL
Qty: 18 TABLET | Refills: 5 | Status: SHIPPED | OUTPATIENT
Start: 2020-01-15 | End: 2020-07-12

## 2020-01-15 RX ORDER — AMLODIPINE BESYLATE 5 MG/1
TABLET ORAL
Qty: 30 TABLET | Refills: 5 | Status: SHIPPED | OUTPATIENT
Start: 2020-01-15 | End: 2020-07-12

## 2020-01-31 DIAGNOSIS — M51.36 DDD (DEGENERATIVE DISC DISEASE), LUMBAR: ICD-10-CM

## 2020-01-31 RX ORDER — HYDROCODONE BITARTRATE AND ACETAMINOPHEN 5; 325 MG/1; MG/1
TABLET ORAL
Qty: 30 TABLET | Refills: 0 | Status: SHIPPED | OUTPATIENT
Start: 2020-01-31 | End: 2020-03-17 | Stop reason: SDUPTHER

## 2020-02-07 ENCOUNTER — CLINICAL SUPPORT (OUTPATIENT)
Dept: FAMILY MEDICINE CLINIC | Facility: CLINIC | Age: 65
End: 2020-02-07
Payer: MEDICARE

## 2020-02-07 DIAGNOSIS — E53.8 VITAMIN B 12 DEFICIENCY: Primary | ICD-10-CM

## 2020-02-07 PROCEDURE — 96372 THER/PROPH/DIAG INJ SC/IM: CPT

## 2020-02-07 RX ORDER — CYANOCOBALAMIN 1000 UG/ML
1000 INJECTION INTRAMUSCULAR; SUBCUTANEOUS ONCE
Status: COMPLETED | OUTPATIENT
Start: 2020-02-07 | End: 2020-02-07

## 2020-02-07 RX ADMIN — CYANOCOBALAMIN 1000 MCG: 1000 INJECTION INTRAMUSCULAR; SUBCUTANEOUS at 12:31

## 2020-02-20 DIAGNOSIS — M51.9 DISC DISORDER OF LUMBAR REGION: ICD-10-CM

## 2020-02-20 RX ORDER — TIZANIDINE 4 MG/1
TABLET ORAL
Qty: 60 TABLET | Refills: 3 | Status: SHIPPED | OUTPATIENT
Start: 2020-02-20 | End: 2020-06-05

## 2020-03-17 DIAGNOSIS — M51.36 DDD (DEGENERATIVE DISC DISEASE), LUMBAR: ICD-10-CM

## 2020-03-17 DIAGNOSIS — F41.1 GAD (GENERALIZED ANXIETY DISORDER): ICD-10-CM

## 2020-03-18 ENCOUNTER — CLINICAL SUPPORT (OUTPATIENT)
Dept: FAMILY MEDICINE CLINIC | Facility: CLINIC | Age: 65
End: 2020-03-18
Payer: MEDICARE

## 2020-03-18 DIAGNOSIS — E53.8 VITAMIN B 12 DEFICIENCY: Primary | ICD-10-CM

## 2020-03-18 PROCEDURE — 96372 THER/PROPH/DIAG INJ SC/IM: CPT

## 2020-03-18 RX ORDER — CLONAZEPAM 1 MG/1
0.5 TABLET ORAL DAILY
Qty: 15 TABLET | Refills: 5 | Status: SHIPPED | OUTPATIENT
Start: 2020-03-18 | End: 2020-07-15 | Stop reason: SDUPTHER

## 2020-03-18 RX ORDER — HYDROCODONE BITARTRATE AND ACETAMINOPHEN 5; 325 MG/1; MG/1
TABLET ORAL
Qty: 30 TABLET | Refills: 0 | Status: SHIPPED | OUTPATIENT
Start: 2020-03-18 | End: 2020-04-16 | Stop reason: SDUPTHER

## 2020-03-18 RX ADMIN — CYANOCOBALAMIN 1000 MCG: 1000 INJECTION INTRAMUSCULAR; SUBCUTANEOUS at 09:25

## 2020-03-19 RX ORDER — CYANOCOBALAMIN 1000 UG/ML
1000 INJECTION INTRAMUSCULAR; SUBCUTANEOUS ONCE
Status: COMPLETED | OUTPATIENT
Start: 2020-03-19 | End: 2020-03-18

## 2020-04-16 DIAGNOSIS — M51.36 DDD (DEGENERATIVE DISC DISEASE), LUMBAR: ICD-10-CM

## 2020-04-17 DIAGNOSIS — F41.1 GAD (GENERALIZED ANXIETY DISORDER): ICD-10-CM

## 2020-04-17 RX ORDER — HYDROCODONE BITARTRATE AND ACETAMINOPHEN 5; 325 MG/1; MG/1
TABLET ORAL
Qty: 30 TABLET | Refills: 0 | Status: SHIPPED | OUTPATIENT
Start: 2020-04-17 | End: 2020-05-26 | Stop reason: SDUPTHER

## 2020-04-17 RX ORDER — SERTRALINE HYDROCHLORIDE 100 MG/1
TABLET, FILM COATED ORAL
Qty: 180 TABLET | Refills: 1 | Status: SHIPPED | OUTPATIENT
Start: 2020-04-17 | End: 2020-10-09

## 2020-04-18 DIAGNOSIS — E03.9 HYPOTHYROIDISM, UNSPECIFIED TYPE: ICD-10-CM

## 2020-04-18 RX ORDER — LEVOTHYROXINE SODIUM 0.12 MG/1
TABLET ORAL
Qty: 90 TABLET | Refills: 1 | Status: SHIPPED | OUTPATIENT
Start: 2020-04-18 | End: 2020-10-11

## 2020-04-20 ENCOUNTER — CLINICAL SUPPORT (OUTPATIENT)
Dept: FAMILY MEDICINE CLINIC | Facility: CLINIC | Age: 65
End: 2020-04-20
Payer: MEDICARE

## 2020-04-20 DIAGNOSIS — E53.8 VITAMIN B 12 DEFICIENCY: Primary | ICD-10-CM

## 2020-04-20 PROCEDURE — 4040F PNEUMOC VAC/ADMIN/RCVD: CPT

## 2020-04-20 PROCEDURE — 99211 OFF/OP EST MAY X REQ PHY/QHP: CPT

## 2020-04-20 PROCEDURE — 1160F RVW MEDS BY RX/DR IN RCRD: CPT

## 2020-04-20 PROCEDURE — 3075F SYST BP GE 130 - 139MM HG: CPT

## 2020-04-20 PROCEDURE — 3078F DIAST BP <80 MM HG: CPT

## 2020-04-20 RX ORDER — CYANOCOBALAMIN 1000 UG/ML
1000 INJECTION INTRAMUSCULAR; SUBCUTANEOUS ONCE
Status: COMPLETED | OUTPATIENT
Start: 2020-04-20 | End: 2020-04-20

## 2020-04-20 RX ADMIN — CYANOCOBALAMIN 1000 MCG: 1000 INJECTION INTRAMUSCULAR; SUBCUTANEOUS at 13:16

## 2020-05-26 DIAGNOSIS — J30.9 ALLERGIC RHINITIS: ICD-10-CM

## 2020-05-26 DIAGNOSIS — M51.36 DDD (DEGENERATIVE DISC DISEASE), LUMBAR: ICD-10-CM

## 2020-05-26 RX ORDER — FLUTICASONE PROPIONATE 50 MCG
2 SPRAY, SUSPENSION (ML) NASAL DAILY
Qty: 3 BOTTLE | Refills: 3 | Status: CANCELLED | OUTPATIENT
Start: 2020-05-26

## 2020-05-27 RX ORDER — HYDROCODONE BITARTRATE AND ACETAMINOPHEN 5; 325 MG/1; MG/1
TABLET ORAL
Qty: 30 TABLET | Refills: 0 | Status: SHIPPED | OUTPATIENT
Start: 2020-05-27 | End: 2020-07-17 | Stop reason: SDUPTHER

## 2020-05-28 ENCOUNTER — CLINICAL SUPPORT (OUTPATIENT)
Dept: FAMILY MEDICINE CLINIC | Facility: CLINIC | Age: 65
End: 2020-05-28
Payer: MEDICARE

## 2020-05-28 DIAGNOSIS — E53.8 VITAMIN B 12 DEFICIENCY: Primary | ICD-10-CM

## 2020-05-28 PROCEDURE — 96372 THER/PROPH/DIAG INJ SC/IM: CPT

## 2020-05-28 RX ORDER — CYANOCOBALAMIN 1000 UG/ML
1000 INJECTION INTRAMUSCULAR; SUBCUTANEOUS
Status: CANCELLED | OUTPATIENT
Start: 2020-05-28

## 2020-06-05 DIAGNOSIS — M51.9 DISC DISORDER OF LUMBAR REGION: ICD-10-CM

## 2020-06-05 RX ORDER — TIZANIDINE 4 MG/1
TABLET ORAL
Qty: 180 TABLET | Refills: 1 | Status: SHIPPED | OUTPATIENT
Start: 2020-06-05 | End: 2020-12-15

## 2020-07-02 ENCOUNTER — CLINICAL SUPPORT (OUTPATIENT)
Dept: FAMILY MEDICINE CLINIC | Facility: CLINIC | Age: 65
End: 2020-07-02
Payer: MEDICARE

## 2020-07-02 DIAGNOSIS — E53.8 VITAMIN B 12 DEFICIENCY: Primary | ICD-10-CM

## 2020-07-02 PROCEDURE — 96372 THER/PROPH/DIAG INJ SC/IM: CPT

## 2020-07-02 RX ORDER — CYANOCOBALAMIN 1000 UG/ML
1000 INJECTION INTRAMUSCULAR; SUBCUTANEOUS
Status: SHIPPED | OUTPATIENT
Start: 2020-07-02

## 2020-07-02 RX ADMIN — CYANOCOBALAMIN 1000 MCG: 1000 INJECTION INTRAMUSCULAR; SUBCUTANEOUS at 14:12

## 2020-07-12 DIAGNOSIS — E78.2 MIXED HYPERLIPIDEMIA: ICD-10-CM

## 2020-07-12 DIAGNOSIS — I10 BENIGN ESSENTIAL HYPERTENSION: ICD-10-CM

## 2020-07-12 RX ORDER — AMLODIPINE BESYLATE 5 MG/1
TABLET ORAL
Qty: 90 TABLET | Refills: 1 | Status: SHIPPED | OUTPATIENT
Start: 2020-07-12 | End: 2021-02-16 | Stop reason: SDUPTHER

## 2020-07-12 RX ORDER — ATORVASTATIN CALCIUM 40 MG/1
TABLET, FILM COATED ORAL
Qty: 54 TABLET | Refills: 1 | Status: SHIPPED | OUTPATIENT
Start: 2020-07-12 | End: 2021-04-04

## 2020-07-15 ENCOUNTER — OFFICE VISIT (OUTPATIENT)
Dept: FAMILY MEDICINE CLINIC | Facility: CLINIC | Age: 65
End: 2020-07-15
Payer: MEDICARE

## 2020-07-15 VITALS
TEMPERATURE: 97.6 F | BODY MASS INDEX: 31.14 KG/M2 | SYSTOLIC BLOOD PRESSURE: 108 MMHG | OXYGEN SATURATION: 96 % | RESPIRATION RATE: 16 BRPM | WEIGHT: 158.6 LBS | HEIGHT: 60 IN | DIASTOLIC BLOOD PRESSURE: 72 MMHG | HEART RATE: 69 BPM

## 2020-07-15 DIAGNOSIS — E78.2 MIXED HYPERLIPIDEMIA: ICD-10-CM

## 2020-07-15 DIAGNOSIS — F41.1 GAD (GENERALIZED ANXIETY DISORDER): ICD-10-CM

## 2020-07-15 DIAGNOSIS — M41.20 SCOLIOSIS (AND KYPHOSCOLIOSIS), IDIOPATHIC: ICD-10-CM

## 2020-07-15 DIAGNOSIS — Z12.11 SCREENING FOR COLON CANCER: ICD-10-CM

## 2020-07-15 DIAGNOSIS — F41.8 DEPRESSION WITH ANXIETY: ICD-10-CM

## 2020-07-15 DIAGNOSIS — E03.9 HYPOTHYROIDISM, UNSPECIFIED TYPE: ICD-10-CM

## 2020-07-15 DIAGNOSIS — Z00.00 ENCOUNTER FOR MEDICARE ANNUAL WELLNESS EXAM: ICD-10-CM

## 2020-07-15 DIAGNOSIS — E55.9 VITAMIN D DEFICIENCY: ICD-10-CM

## 2020-07-15 DIAGNOSIS — I47.1 PSVT (PAROXYSMAL SUPRAVENTRICULAR TACHYCARDIA) (HCC): ICD-10-CM

## 2020-07-15 DIAGNOSIS — I10 BENIGN ESSENTIAL HYPERTENSION: Primary | ICD-10-CM

## 2020-07-15 DIAGNOSIS — E53.8 VITAMIN B 12 DEFICIENCY: ICD-10-CM

## 2020-07-15 DIAGNOSIS — Z12.39 SCREENING FOR BREAST CANCER: ICD-10-CM

## 2020-07-15 PROCEDURE — 1160F RVW MEDS BY RX/DR IN RCRD: CPT | Performed by: FAMILY MEDICINE

## 2020-07-15 PROCEDURE — 4040F PNEUMOC VAC/ADMIN/RCVD: CPT | Performed by: FAMILY MEDICINE

## 2020-07-15 PROCEDURE — 3078F DIAST BP <80 MM HG: CPT | Performed by: FAMILY MEDICINE

## 2020-07-15 PROCEDURE — 3074F SYST BP LT 130 MM HG: CPT | Performed by: FAMILY MEDICINE

## 2020-07-15 PROCEDURE — 1036F TOBACCO NON-USER: CPT | Performed by: FAMILY MEDICINE

## 2020-07-15 PROCEDURE — 1123F ACP DISCUSS/DSCN MKR DOCD: CPT | Performed by: FAMILY MEDICINE

## 2020-07-15 PROCEDURE — 1125F AMNT PAIN NOTED PAIN PRSNT: CPT | Performed by: FAMILY MEDICINE

## 2020-07-15 PROCEDURE — 99214 OFFICE O/P EST MOD 30 MIN: CPT | Performed by: FAMILY MEDICINE

## 2020-07-15 PROCEDURE — G0439 PPPS, SUBSEQ VISIT: HCPCS | Performed by: FAMILY MEDICINE

## 2020-07-15 PROCEDURE — 1170F FXNL STATUS ASSESSED: CPT | Performed by: FAMILY MEDICINE

## 2020-07-15 NOTE — PROGRESS NOTES
FAMILY PRACTICE OFFICE VISIT       NAME: Amador Patrick  AGE: 76 y o  SEX: female       : 1955        MRN: [id number]        Assessment and Plan     Problem List Items Addressed This Visit        Endocrine    Hypothyroidism     · History of thyroid nodules, I again  advised patient to proceed with thyroid ultrasound  · Continue levothyroxine 125 mcg daily  · Monitor TSH         Relevant Orders    TSH, 3rd generation    US thyroid       Cardiovascular and Mediastinum    Benign essential hypertension - Primary     · Blood pressure is well controlled, continue regimen of amlodipine and Bystolic         Relevant Orders    CBC and differential    Comprehensive metabolic panel    PSVT (paroxysmal supraventricular tachycardia) ([facility] [address] )     · Patient is under care of Cardiology   · Patient reports no symptoms of palpitations, dyspnea, chest pain or dizziness  · EKG performed in the office today is unremarkable,will forward results to Cardiology group  · Continue Bystolic 5 mg daily         Relevant Orders    POCT ECG (Completed)       Musculoskeletal and Integument    Scoliosis (and kyphoscoliosis), idiopathic     · Chronic severe arthritic spine pain due to scoliosis and multilevel disc disease and stenosis of cervical and lumbar spine  · Patient remains on regimen of muscle relaxants on an as-needed basis  · She was not able to attend chiropractic treatments due to COVID-19 endemic and reports worsening of her pain  · Patient uses hydrocodone sparingly as needed, no history of misuse  Other    Depression with anxiety     · Chronic symptoms of depression anxiety  · Continue Zoloft 200 mg daily  · Patient reports worsening of anxiety symptoms lately, will increase dose of clonazepam to 0 5 mg b i d  p r n           Relevant Medications    clonazePAM (KlonoPIN) 1 mg tablet    Mixed hyperlipidemia     · Continue atorvastatin 40 mg 4 days per week, pending blood work         Relevant Orders Comprehensive metabolic panel    Lipid Panel with Direct LDL reflex    Vitamin B 12 deficiency     · Continue vitamin B12 injections on a monthly basis         Relevant Orders    Vitamin B12    KERON (generalized anxiety disorder)    Relevant Medications    clonazePAM (KlonoPIN) 1 mg tablet      Other Visit Diagnoses     Vitamin D deficiency        Relevant Orders    Vitamin D 25 hydroxy    Screening for breast cancer        Relevant Orders    Mammo screening bilateral w 3d & cad    Encounter for Medicare annual wellness exam        Screening for colon cancer        Relevant Orders    Cologuard      Patient presents for follow-up of chronic medical conditions  Assessment and plan as outlined above  She has been feeling generally stably  She reports worsening of stress due to recent COVID-19 endemic, 12 lb weight gain within past year  Patient is under care of Cardiology due to history of PSVT  Patient reports history of sleep apnea, recent home study performed as per Cardiology, I do not have results, patient will contact cardiology for proper follow-up  EKG performed in the office today reveals no acute findings, results forwarded to cardiology for review    Health maintenance:  I reminded patient to schedule mammogram; she is agreeable to proceed with Cologuard screening  Patient will proceed with blood work  Follow-up 6 months  Patient Instructions       Medicare Preventive Visit Patient Instructions  Thank you for completing your Welcome to Medicare Visit or Medicare Annual Wellness Visit today  Your next wellness visit will be due in one year (7/15/2021)  The screening/preventive services that you may require over the next 5-10 years are detailed below  Some tests may not apply to you based off risk factors and/or age  Screening tests ordered at today's visit but not completed yet may show as past due  Also, please note that scanned in results may not display below    Preventive Screenings:  Service Recommendations Previous Testing/Comments   Colorectal Cancer Screening  * Colonoscopy    * Fecal Occult Blood Test (FOBT)/Fecal Immunochemical Test (FIT)  * Fecal DNA/Cologuard Test  * Flexible Sigmoidoscopy Age: 54-65 years old   Colonoscopy: every 10 years (may be performed more frequently if at higher risk)  OR  FOBT/FIT: every 1 year  OR  Cologuard: every 3 years  OR  Sigmoidoscopy: every 5 years  Screening may be recommended earlier than age 48 if at higher risk for colorectal cancer  Also, an individualized decision between you and your healthcare provider will decide whether screening between the ages of 74-80 would be appropriate  Colonoscopy: 08/28/2013  FOBT/FIT: Not on file  Cologuard: Not on file  Sigmoidoscopy: Not on file    Screening Current     Breast Cancer Screening Age: 36 years old  Frequency: every 1-2 years  Not required if history of left and right mastectomy Mammogram: Not on file       Cervical Cancer Screening Between the ages of 21-29, pap smear recommended once every 3 years  Between the ages of 33-67, can perform pap smear with HPV co-testing every 5 years  Recommendations may differ for women with a history of total hysterectomy, cervical cancer, or abnormal pap smears in past  Pap Smear: Not on file    Screening Not Indicated   Hepatitis C Screening Once for adults born between 1945 and 1965  More frequently in patients at high risk for Hepatitis C Hep C Antibody: 02/21/2017    Screening Current   Diabetes Screening 1-2 times per year if you're at risk for diabetes or have pre-diabetes Fasting glucose: 105 mg/dL   A1C: No results in last 5 years    Screening Current   Cholesterol Screening Once every 5 years if you don't have a lipid disorder  May order more often based on risk factors  Lipid panel: 11/22/2019    Screening Not Indicated  History Lipid Disorder     Other Preventive Screenings Covered by Medicare:  1   Abdominal Aortic Aneurysm (AAA) Screening: covered once if your at risk  You're considered to be at risk if you have a family history of AAA  2  Lung Cancer Screening: covers low dose CT scan once per year if you meet all of the following conditions: (1) Age 50-69; (2) No signs or symptoms of lung cancer; (3) Current smoker or have quit smoking within the last 15 years; (4) You have a tobacco smoking history of at least 30 pack years (packs per day multiplied by number of years you smoked); (5) You get a written order from a healthcare provider  3  Glaucoma Screening: covered annually if you're considered high risk: (1) You have diabetes OR (2) Family history of glaucoma OR (3)  aged 48 and older OR (3)  American aged 72 and older  3  Osteoporosis Screening: covered every 2 years if you meet one of the following conditions: (1) You're estrogen deficient and at risk for osteoporosis based off medical history and other findings; (2) Have a vertebral abnormality; (3) On glucocorticoid therapy for more than 3 months; (4) Have primary hyperparathyroidism; (5) On osteoporosis medications and need to assess response to drug therapy  · Last bone density test (DXA Scan): Not on file  5  HIV Screening: covered annually if you're between the age of 12-76  Also covered annually if you are younger than 13 and older than 72 with risk factors for HIV infection  For pregnant patients, it is covered up to 3 times per pregnancy  Immunizations:  Immunization Recommendations   Influenza Vaccine Annual influenza vaccination during flu season is recommended for all persons aged >= 6 months who do not have contraindications   Pneumococcal Vaccine (Prevnar and Pneumovax)  * Prevnar = PCV13  * Pneumovax = PPSV23   Adults 25-60 years old: 1-3 doses may be recommended based on certain risk factors  Adults 72 years old: Prevnar (PCV13) vaccine recommended followed by Pneumovax (PPSV23) vaccine   If already received PPSV23 since turning 65, then PCV13 recommended at least one year after PPSV23 dose  Hepatitis B Vaccine 3 dose series if at intermediate or high risk (ex: diabetes, end stage renal disease, liver disease)   Tetanus (Td) Vaccine - COST NOT COVERED BY MEDICARE PART B Following completion of primary series, a booster dose should be given every 10 years to maintain immunity against tetanus  Td may also be given as tetanus wound prophylaxis  Tdap Vaccine - COST NOT COVERED BY MEDICARE PART B Recommended at least once for all adults  For pregnant patients, recommended with each pregnancy  Shingles Vaccine (Shingrix) - COST NOT COVERED BY MEDICARE PART B  2 shot series recommended in those aged 48 and above     Health Maintenance Due:      Topic Date Due    MAMMOGRAM  1955    DXA SCAN  1955    Cervical Cancer Screening  04/09/1975    CRC Screening: Colonoscopy  08/28/2023    Hepatitis C Screening  Completed     Immunizations Due:      Topic Date Due    Pneumococcal Vaccine: 65+ Years (2 of 2 - PPSV23) 05/09/2020     Advance Directives   What are advance directives? Advance directives are legal documents that state your wishes and plans for medical care  These plans are made ahead of time in case you lose your ability to make decisions for yourself  Advance directives can apply to any medical decision, such as the treatments you want, and if you want to donate organs  What are the types of advance directives? There are many types of advance directives, and each state has rules about how to use them  You may choose a combination of any of the following:  · Living will: This is a written record of the treatment you want  You can also choose which treatments you do not want, which to limit, and which to stop at a certain time  This includes surgery, medicine, IV fluid, and tube feedings  · Durable power of  for healthcare [facility]):   This is a written record that states who you want to make healthcare choices for you when you are unable to make them for yourself  This person, called a proxy, is usually a family member or a friend  You may choose more than 1 proxy  · Do not resuscitate (DNR) order:  A DNR order is used in case your heart stops beating or you stop breathing  It is a request not to have certain forms of treatment, such as CPR  A DNR order may be included in other types of advance directives  · Medical directive: This covers the care that you want if you are in a coma, near death, or unable to make decisions for yourself  You can list the treatments you want for each condition  Treatment may include pain medicine, surgery, blood transfusions, dialysis, IV or tube feedings, and a ventilator (breathing machine)  · Values history: This document has questions about your views, beliefs, and how you feel and think about life  This information can help others choose the care that you would choose  Why are advance directives important? An advance directive helps you control your care  Although spoken wishes may be used, it is better to have your wishes written down  Spoken wishes can be misunderstood, or not followed  Treatments may be given even if you do not want them  An advance directive may make it easier for your family to make difficult choices about your care  Urinary Incontinence   Urinary incontinence (UI)  is when you lose control of your bladder  UI develops because your bladder cannot store or empty urine properly  The 3 most common types of UI are stress incontinence, urge incontinence, or both  Medicines:   · May be given to help strengthen your bladder control  Report any side effects of medication to your healthcare provider  Do pelvic muscle exercises often:  Your pelvic muscles help you stop urinating  Squeeze these muscles tight for 5 seconds, then relax for 5 seconds  Gradually work up to squeezing for 10 seconds  Do 3 sets of 15 repetitions a day, or as directed   This will help strengthen your pelvic muscles and improve bladder control  Train your bladder:  Go to the bathroom at set times, such as every 2 hours, even if you do not feel the urge to go  You can also try to hold your urine when you feel the urge to go  For example, hold your urine for 5 minutes when you feel the urge to go  As that becomes easier, hold your urine for 10 minutes  Self-care:   · Keep a UI record  Write down how often you leak urine and how much you leak  Make a note of what you were doing when you leaked urine  · Drink liquids as directed  You may need to limit the amount of liquid you drink to help control your urine leakage  Do not drink any liquid right before you go to bed  Limit or do not have drinks that contain caffeine or alcohol  · Prevent constipation  Eat a variety of high-fiber foods  Good examples are high-fiber cereals, beans, vegetables, and whole-grain breads  Walking is the best way to trigger your intestines to have a bowel movement  · Exercise regularly and maintain a healthy weight  Weight loss and exercise will decrease pressure on your bladder and help you control your leakage  · Use a catheter as directed  to help empty your bladder  A catheter is a tiny, plastic tube that is put into your bladder to drain your urine  · Go to behavior therapy as directed  Behavior therapy may be used to help you learn to control your urge to urinate  Weight Management   Why it is important to manage your weight:  Being overweight increases your risk of health conditions such as heart disease, high blood pressure, type 2 diabetes, and certain types of cancer  It can also increase your risk for osteoarthritis, sleep apnea, and other respiratory problems  Aim for a slow, steady weight loss  Even a small amount of weight loss can lower your risk of health problems  How to lose weight safely:  A safe and healthy way to lose weight is to eat fewer calories and get regular exercise   You can lose up about 1 pound a week by decreasing the number of calories you eat by 500 calories each day  Healthy meal plan for weight management:  A healthy meal plan includes a variety of foods, contains fewer calories, and helps you stay healthy  A healthy meal plan includes the following:  · Eat whole-grain foods more often  A healthy meal plan should contain fiber  Fiber is the part of grains, fruits, and vegetables that is not broken down by your body  Whole-grain foods are healthy and provide extra fiber in your diet  Some examples of whole-grain foods are whole-wheat breads and pastas, oatmeal, brown rice, and bulgur  · Eat a variety of vegetables every day  Include dark, leafy greens such as spinach, kale, dima greens, and mustard greens  Eat yellow and orange vegetables such as carrots, sweet potatoes, and winter squash  · Eat a variety of fruits every day  Choose fresh or canned fruit (canned in its own juice or light syrup) instead of juice  Fruit juice has very little or no fiber  · Eat low-fat dairy foods  Drink fat-free (skim) milk or 1% milk  Eat fat-free yogurt and low-fat cottage cheese  Try low-fat cheeses such as mozzarella and other reduced-fat cheeses  · Choose meat and other protein foods that are low in fat  Choose beans or other legumes such as split peas or lentils  Choose fish, skinless poultry (chicken or turkey), or lean cuts of red meat (beef or pork)  Before you cook meat or poultry, cut off any visible fat  · Use less fat and oil  Try baking foods instead of frying them  Add less fat, such as margarine, sour cream, regular salad dressing and mayonnaise to foods  Eat fewer high-fat foods  Some examples of high-fat foods include french fries, doughnuts, ice cream, and cakes  · Eat fewer sweets  Limit foods and drinks that are high in sugar  This includes candy, cookies, regular soda, and sweetened drinks  Exercise:  Exercise at least 30 minutes per day on most days of the week   Some examples of exercise include walking, biking, dancing, and swimming  You can also fit in more physical activity by taking the stairs instead of the elevator or parking farther away from stores  Ask your healthcare provider about the best exercise plan for you  © Copyright [employer] 2018 Information is for End User's use only and may not be sold, redistributed or otherwise used for commercial purposes  All illustrations and images included in CareNotes® are the copyrighted property of [employer]  or [employer]        Discussed with the patient and all questioned fully answered  She will call me if any problems arise  M*Modal software was used to dictate this note  It may contain errors with dictating incorrect words/spelling  Please contact provider directly with any questions  Chief Complaint     Chief Complaint   Patient presents with    Medicare Wellness Visit    Follow-up       History of Present Illness     Patient presents for follow-up  She has been feeling stably  Chronic generalized myalgias, arthralgias and neck/back pain  Patient with known multilevel spine disc disease, stenosis and scoliosis  Patient was not able to follow-up with her chiropractor for active release therapy due to COVID-19 endemic  She remains on regimen of muscle relaxants and p r n  Vicodin for chronic severe arthritic pain  No history of misuse  Patient uses medications as directed  She is due for routine blood work  History of PSVT  Patient denies symptoms of chest pain, palpitations, shortness of breath or dizziness  Patient is under care of [address] [place] [address] Drive  She just completed home sleep study but did not hear back regarding results  I reviewed recent telephone message from heart care group  Patient reports previous history of obstructive sleep apnea  She lost her CPAP machine during recent move few years ago      Patient remains on Lipitor 40 mg 4 days per week for treatment of hyperlipidemia  Hypothyroidism:  Patient uses levothyroxine daily as directed  She is due blood blood work  History of thyroid nodules, patient unfortunately never proceeded with thyroid ultrasound as directed  Hypertension:  She remains on regimen of Bystolic and Norvasc  Anemia, iron and vitamin B12 deficiency  Patient remains on daily oral iron supplements and B12 injections on a monthly basis  Health maintenance:  Patient had reaction to pneumonia vaccine and prefers to hold off any further vaccinations  She is past due mammogram   Patient is due for colorectal screening but prefers to avoid colonoscopy, she is agreeable to proceed with Cologuard  Review of Systems   Review of Systems   Constitutional: Negative  HENT: Negative  Eyes: Negative  Respiratory: Negative  Cardiovascular: Negative  Endocrine: Negative  Genitourinary: Negative  Musculoskeletal: Positive for arthralgias, back pain, myalgias and neck pain  Skin: Negative  Allergic/Immunologic: Negative  Neurological: Negative  Psychiatric/Behavioral: Positive for dysphoric mood  The patient is nervous/anxious          Active Problem List     Patient Active Problem List   Diagnosis    Benign essential hypertension    Hypothyroidism    Lumbosacral radiculitis    Cervical stenosis of spinal canal    Disc disorder of lumbar region    Gastroesophageal reflux disease without esophagitis    Seasonal allergic rhinitis due to pollen    PSVT (paroxysmal supraventricular tachycardia) (HCC)    Iron deficiency anemia secondary to inadequate dietary iron intake    Irritable bowel syndrome with diarrhea    Depression with anxiety    Mixed hyperlipidemia    Scoliosis (and kyphoscoliosis), idiopathic    Vitamin B 12 deficiency    KERON (generalized anxiety disorder)       Past Medical History:  Past Medical History:   Diagnosis Date    Anxiety and depression     Bleeding ulcer     Disease of thyroid gland     GERD (gastroesophageal reflux disease)     Scoliosis     Spinal meningioma (HCC)        Past Surgical History:  Past Surgical History:   Procedure Laterality Date    SPINAL CORD STIMULATOR IMPLANT      SPINE SURGERY         Family History:  Family History   Problem Relation Age of Onset    Heart disease Mother     Esophageal cancer Father     Kidney cancer Father     Thyroid disease Father     Diabetes Son        Social History:  Social History     Socioeconomic History    Marital status:       Spouse name: Not on file    Number of children: Not on file    Years of education: Not on file    Highest education level: Not on file   Occupational History    Not on file   Social Needs    Financial resource strain: Not on file    Food insecurity:     Worry: Not on file     Inability: Not on file    Transportation needs:     Medical: Not on file     Non-medical: Not on file   Tobacco Use    Smoking status: Never Smoker    Smokeless tobacco: Never Used   Substance and Sexual Activity    Alcohol use: Yes     Comment: rare    Drug use: No    Sexual activity: Not on file   Lifestyle    Physical activity:     Days per week: Not on file     Minutes per session: Not on file    Stress: Not on file   Relationships    Social connections:     Talks on phone: Not on file     Gets together: Not on file     Attends Mandaeism service: Not on file     Active member of club or organization: Not on file     Attends meetings of clubs or organizations: Not on file     Relationship status: Not on file    Intimate partner violence:     Fear of current or ex partner: Not on file     Emotionally abused: Not on file     Physically abused: Not on file     Forced sexual activity: Not on file   Other Topics Concern    Not on file   Social History Narrative        Retired    2 adult sons    Grandchildren           Objective     Vitals:    07/15/20 0836 07/15/20 0925   BP: 142/78 108/72   BP Location: Left arm    Patient Position: Sitting    Cuff Size: Adult    Pulse: 69    Resp: 16    Temp: 97 6 °F (36 4 °C)    TempSrc: Temporal    SpO2: 96%    Weight: 71 9 kg (158 lb 9 6 oz)    Height: 5' (1 524 m)      Wt Readings from Last 3 Encounters:   07/15/20 71 9 kg (158 lb 9 6 oz)   11/22/19 69 5 kg (153 lb 3 2 oz)   05/09/19 66 5 kg (146 lb 9 6 oz)       Physical Exam   Constitutional: She is oriented to person, place, and time  She appears well-developed and well-nourished  HENT:   Head: Normocephalic and atraumatic  Eyes: Conjunctivae are normal    Neck: Neck supple  Carotid bruit is not present  No thyromegaly present  Cardiovascular: Normal rate, regular rhythm and normal heart sounds  No murmur heard  Pulmonary/Chest: Effort normal and breath sounds normal  No respiratory distress  She has no wheezes  Abdominal: Soft  Bowel sounds are normal  She exhibits no distension and no abdominal bruit  Musculoskeletal: Normal range of motion  She exhibits no edema  Neurological: She is alert and oriented to person, place, and time  No cranial nerve deficit  Coordination normal    Psychiatric: She has a normal mood and affect  Her behavior is normal    Nursing note and vitals reviewed  EKG:  Normal sinus rhythm, 64 beats per minute, no acute ischemic changes, prominent Q-waves in V1 V2, reportedly unchanged since previous studies from March of 2017 and cited before May 12, 2003 as per [facility] records    No direct study is available for comparison     Pertinent Laboratory/Diagnostic Studies:  Lab Results   Component Value Date    BUN 21 11/22/2019    CREATININE 0 69 11/22/2019    CALCIUM 9 5 11/22/2019    K 4 5 11/22/2019    CO2 32 11/22/2019     11/22/2019     Lab Results   Component Value Date    ALT 34 11/22/2019    AST 24 11/22/2019    ALKPHOS 94 11/22/2019       Lab Results   Component Value Date    WBC 6 69 11/22/2019    HGB 13 1 11/22/2019    HCT 39 7 11/22/2019    MCV 93 11/22/2019     11/22/2019       No results found for: TSH    No results found for: CHOL  Lab Results   Component Value Date    TRIG 154 (H) 11/22/2019     Lab Results   Component Value Date    HDL 52 11/22/2019     Lab Results   Component Value Date    LDLCALC 115 (H) 11/22/2019     No results found for: HGBA1C    Results for orders placed or performed in visit on 11/22/19   CBC   Result Value Ref Range    WBC 6 69 4 31 - 10 16 Thousand/uL    RBC 4 27 3 81 - 5 12 Million/uL    Hemoglobin 13 1 11 5 - 15 4 g/dL    Hematocrit 39 7 34 8 - 46 1 %    MCV 93 82 - 98 fL    MCH 30 7 26 8 - 34 3 pg    MCHC 33 0 31 4 - 37 4 g/dL    RDW 12 8 11 6 - 15 1 %    Platelets 289 525 - 587 Thousands/uL    MPV 9 6 8 9 - 12 7 fL   Comprehensive metabolic panel   Result Value Ref Range    Sodium 138 136 - 145 mmol/L    Potassium 4 5 3 5 - 5 3 mmol/L    Chloride 104 100 - 108 mmol/L    CO2 32 21 - 32 mmol/L    ANION GAP 2 (L) 4 - 13 mmol/L    BUN 21 5 - 25 mg/dL    Creatinine 0 69 0 60 - 1 30 mg/dL    Glucose, Fasting 105 (H) 65 - 99 mg/dL    Calcium 9 5 8 3 - 10 1 mg/dL    AST 24 5 - 45 U/L    ALT 34 12 - 78 U/L    Alkaline Phosphatase 94 46 - 116 U/L    Total Protein 8 2 6 4 - 8 2 g/dL    Albumin 4 4 3 5 - 5 0 g/dL    Total Bilirubin 0 45 0 20 - 1 00 mg/dL    eGFR 92 ml/min/1 73sq m   Lipid panel   Result Value Ref Range    Cholesterol 198 50 - 200 mg/dL    Triglycerides 154 (H) <=150 mg/dL    HDL, Direct 52 >=40 mg/dL    LDL Calculated 115 (H) 0 - 100 mg/dL    Non-HDL-Chol (CHOL-HDL) 146 mg/dl   TSH, 3rd generation   Result Value Ref Range    TSH 3RD GENERATON 1 260 0 358 - 3 740 uIU/mL   Vitamin B12   Result Value Ref Range    Vitamin B-12 522 100 - 900 pg/mL   Vitamin D 25 hydroxy   Result Value Ref Range    Vit D, 25-Hydroxy 41 8 30 0 - 100 0 ng/mL   Iron   Result Value Ref Range    Iron 91 50 - 170 ug/dL       Orders Placed This Encounter   Procedures    Cologuard    Mammo screening bilateral w 3d & cad    US thyroid    CBC and differential  Comprehensive metabolic panel    Lipid Panel with Direct LDL reflex    TSH, 3rd generation    Vitamin B12    Vitamin D 25 hydroxy    POCT ECG       ALLERGIES:  Allergies   Allergen Reactions    Acetaminophen     Cephalosporins Other (See Comments)     GI Upset (vomit/diarrhea)    Medical Tape Rash    Metformin      Other reaction(s): Nausea and/or vomiting    Nabumetone GI Intolerance    Other GI Intolerance    Anesthesia S-I-40 [Propofol]     Celecoxib     Cephalexin     Choline Magnesium Trisalicylate     Diclofenac     Diclofenac Potassium GI Intolerance    Diclofenac Sodium GI Intolerance    Fentanyl     Propoxyphene Other (See Comments)     unknown    Sulfa Antibiotics     Sulfacetamide     Zolpidem        Current Medications     Current Outpatient Medications   Medication Sig Dispense Refill    amLODIPine (NORVASC) 5 mg tablet TAKE 1 TABLET BY MOUTH EVERY DAY 90 tablet 1    atorvastatin (LIPITOR) 40 mg tablet TAKE 1 TABLET BY MOUTH 4 DAYS PER WEEK 54 tablet 1    Calcium Carbonate-Vitamin D (CALCIUM CREAMIES PO) Take 600 mg by mouth daily      Cholecalciferol ([employer]S VITAMIN D3) 1000 units capsule Take 2,000 Units by mouth      clonazePAM (KlonoPIN) 1 mg tablet Take half a tablet (0 5 mg) twice a day as needed for anxiety 30 tablet 2    Cyanocobalamin 1000 MCG/ML LIQD cyanocobalamin (vit B-12) 1,000 mcg/mL injection solution      ferrous sulfate 325 (65 Fe) mg tablet Take by oral route        fluticasone (FLONASE) 50 mcg/act nasal spray USE 2 SPRAYS IN EACH NOSTRIL EVERY DAY 3 Bottle 3    levothyroxine 125 mcg tablet TAKE 1 TABLET BY MOUTH EVERY DAY 90 tablet 1    lidocaine (LIDODERM) 5 % Apply 1-2 patches daily as needed, remove in 12 hours 60 patch 5    metaxalone (SKELAXIN) 800 mg tablet Take 1 tablet (800 mg total) by mouth 2 (two) times a day as needed for muscle spasms 60 tablet 2    nebivolol (BYSTOLIC) 10 mg tablet Take 20 mg by mouth daily      omega-3-acid ethyl esters (LOVAZA) 1 g capsule Take 2 g by mouth daily      pantoprazole (PROTONIX) 40 mg tablet Take 1 tablet (40 mg total) by mouth daily (Patient taking differently: Take 40 mg by mouth as needed ) 30 tablet 1    sertraline (ZOLOFT) 100 mg tablet TAKE 2 TABLETS BY MOUTH EVERY  tablet 1    tiZANidine (ZANAFLEX) 4 mg tablet TAKE 2 TABLETS BY MOUTH AT BEDTIME AS NEEDED FOR MUSCLE SPASMS 180 tablet 1    HYDROcodone-acetaminophen (NORCO) 5-325 mg per tablet Take 1 tablet 3 times a day as needed for severe low back pain 30 tablet 0     Current Facility-Administered Medications   Medication Dose Route Frequency Provider Last Rate Last Dose    cyanocobalamin injection 1,000 mcg  1,000 mcg Intramuscular Q30 Days Margarita Lee MD   1,000 mcg at 02/21/18 1534    cyanocobalamin injection 1,000 mcg  1,000 mcg Intramuscular Q30 Days Margarita Lee MD   1,000 mcg at 03/29/18 0717    cyanocobalamin injection 1,000 mcg  1,000 mcg Intramuscular Q30 Days Margarita Lee MD   1,000 mcg at 04/30/18 1737    cyanocobalamin injection 1,000 mcg  1,000 mcg Intramuscular J03 Days Ana Maria Hills MD   1,456 mcg at 08/10/18 1017    cyanocobalamin injection 1,000 mcg  1,000 mcg Intramuscular Q30 Days Margarita Lee MD   1,000 mcg at 09/10/18 1127    cyanocobalamin injection 1,000 mcg  1,000 mcg Intramuscular Q30 Days Margarita Lee MD   1,000 mcg at 02/11/19 0958    cyanocobalamin injection 1,000 mcg  1,000 mcg Intramuscular Q30 Days Margarita Lee MD   1,000 mcg at 09/23/19 1739    cyanocobalamin injection 1,000 mcg  1,000 mcg Intramuscular Q30 Days Margarita Lee MD   1,000 mcg at 10/29/19 2010    cyanocobalamin injection 1,000 mcg  1,000 mcg Intramuscular Q30 Days Janet Sifuentes MD   1,000 mcg at 12/06/19 1513    cyanocobalamin injection 1,000 mcg  1,000 mcg Intramuscular Q30 Days Margarita Lee MD   1,000 mcg at 07/02/20 1412       Medications Discontinued During This Encounter Medication Reason    clonazePAM (KlonoPIN) 1 mg tablet Reorder       Health Maintenance     Health Maintenance   Topic Date Due    MAMMOGRAM  1955    DXA SCAN  1955    Cervical Cancer Screening  04/09/1975    Medicare Annual Wellness Visit (AWV)  05/09/2020    Pneumococcal Vaccine: 65+ Years (2 of 2 - PPSV23) 05/09/2020    BMI: Followup Plan  05/12/2020    Influenza Vaccine  11/22/2020 (Originally 7/1/2020)    Fall Risk  07/15/2021    BMI: Adult  07/15/2021    CRC Screening: Colonoscopy  08/28/2023    DTaP,Tdap,and Td Vaccines (3 - Td) 04/22/2029    Hepatitis C Screening  Completed    Pneumococcal Vaccine: Pediatrics (0 to 5 Years) and At-Risk Patients (6 to 59 Years)  Aged Out    HIB Vaccine  Aged Out    Hepatitis B Vaccine  Aged Out    IPV Vaccine  Aged Out    Hepatitis A Vaccine  Aged Out    Meningococcal ACWY Vaccine  Aged Out    HPV Vaccine  Aged [employer] History   Administered Date(s) Administered    INFLUENZA 10/08/2007, 10/24/2008, 10/13/2009, 10/17/2011, 10/13/2012, 10/21/2013, 11/05/2014, 10/02/2015, 10/13/2016, 10/10/2017, 10/10/2017    Influenza, recombinant, quadrivalent,injectable, preservative free 10/10/2018    Pneumococcal Conjugate 13-Valent 05/09/2019    Td (adult), Unspecified 08/12/1993    Tdap 02/17/2008, 04/22/2019    Zoster 04/29/2014       Gigi Monroe MD

## 2020-07-15 NOTE — PROGRESS NOTES
Assessment and Plan:     Problem List Items Addressed This Visit        Endocrine    Hypothyroidism     · History of thyroid nodules, I again  advised patient to proceed with thyroid ultrasound  · Continue levothyroxine 125 mcg daily  · Monitor TSH         Relevant Orders    TSH, 3rd generation    US thyroid       Cardiovascular and Mediastinum    Benign essential hypertension - Primary     · Blood pressure is well controlled, continue regimen of amlodipine and Bystolic         Relevant Orders    CBC and differential    Comprehensive metabolic panel    PSVT (paroxysmal supraventricular tachycardia) ([address] )     · Patient is under care of Cardiology   · Patient reports no symptoms of palpitations, dyspnea, chest pain or dizziness  · EKG performed in the office today is unremarkable,will forward results to Cardiology group  · Continue Bystolic 5 mg daily         Relevant Orders    POCT ECG (Completed)       Musculoskeletal and Integument    Scoliosis (and kyphoscoliosis), idiopathic     · Chronic severe arthritic spine pain due to scoliosis and multilevel disc disease and stenosis of cervical and lumbar spine  · Patient remains on regimen of muscle relaxants on an as-needed basis  · She was not able to attend chiropractic treatments due to COVID-19 endemic and reports worsening of her pain  · Patient uses hydrocodone sparingly as needed, no history of misuse  Other    Depression with anxiety     · Chronic symptoms of depression anxiety  · Continue Zoloft 200 mg daily  · Patient reports worsening of anxiety symptoms lately, will increase dose of clonazepam to 0 5 mg b i d  p r n           Relevant Medications    clonazePAM (KlonoPIN) 1 mg tablet    Mixed hyperlipidemia     · Continue atorvastatin 40 mg 4 days per week, pending blood work         Relevant Orders    Comprehensive metabolic panel    Lipid Panel with Direct LDL reflex    Vitamin B 12 deficiency     · Continue vitamin B12 injections on a monthly basis         Relevant Orders    Vitamin B12    KERON (generalized anxiety disorder)    Relevant Medications    clonazePAM (KlonoPIN) 1 mg tablet      Other Visit Diagnoses     Vitamin D deficiency        Relevant Orders    Vitamin D 25 hydroxy    Screening for breast cancer        Relevant Orders    Mammo screening bilateral w 3d & cad    Encounter for Medicare annual wellness exam        Screening for colon cancer        Relevant Orders    Cologuard        BMI Counseling: Body mass index is 30 97 kg/m²  The BMI is above normal  Nutrition recommendations include encouraging healthy choices of fruits and vegetables, consuming healthier snacks, moderation in carbohydrate intake and reducing intake of cholesterol  Exercise recommendations include exercising 3-5 times per week  Preventive health issues were discussed with patient, and age appropriate screening tests were ordered as noted in patient's After Visit Summary  Personalized health advice and appropriate referrals for health education or preventive services given if needed, as noted in patient's After Visit Summary       History of Present Illness:     Patient presents for Medicare Annual Wellness visit    Patient Care Team:  Danilo Lopes MD as PCP - General (Family Medicine)     Problem List:     Patient Active Problem List   Diagnosis    Benign essential hypertension    Hypothyroidism    Lumbosacral radiculitis    Cervical stenosis of spinal canal    Disc disorder of lumbar region    Gastroesophageal reflux disease without esophagitis    Seasonal allergic rhinitis due to pollen    PSVT (paroxysmal supraventricular tachycardia) (HCC)    Iron deficiency anemia secondary to inadequate dietary iron intake    Irritable bowel syndrome with diarrhea    Depression with anxiety    Mixed hyperlipidemia    Scoliosis (and kyphoscoliosis), idiopathic    Vitamin B 12 deficiency    KERON (generalized anxiety disorder)      Past Medical and Surgical History:     Past Medical History:   Diagnosis Date    Anxiety and depression     Bleeding ulcer     Disease of thyroid gland     GERD (gastroesophageal reflux disease)     Scoliosis     Spinal meningioma ([address] )      Past Surgical History:   Procedure Laterality Date    SPINAL CORD STIMULATOR IMPLANT      SPINE SURGERY        Family History:     Family History   Problem Relation Age of Onset    Heart disease Mother     Esophageal cancer Father     Kidney cancer Father     Thyroid disease Father     Diabetes Son       Social History:        Social History     Socioeconomic History    Marital status:       Spouse name: None    Number of children: None    Years of education: None    Highest education level: None   Occupational History    None   Social Needs    Financial resource strain: None    Food insecurity:     Worry: None     Inability: None    Transportation needs:     Medical: None     Non-medical: None   Tobacco Use    Smoking status: Never Smoker    Smokeless tobacco: Never Used   Substance and Sexual Activity    Alcohol use: Yes     Comment: rare    Drug use: No    Sexual activity: None   Lifestyle    Physical activity:     Days per week: None     Minutes per session: None    Stress: None   Relationships    Social connections:     Talks on phone: None     Gets together: None     Attends Hinduism service: None     Active member of club or organization: None     Attends meetings of clubs or organizations: None     Relationship status: None    Intimate partner violence:     Fear of current or ex partner: None     Emotionally abused: None     Physically abused: None     Forced sexual activity: None   Other Topics Concern    None   Social History Narrative        Retired    2 adult sons    Grandchildren      Medications and Allergies:     Current Outpatient Medications   Medication Sig Dispense Refill    amLODIPine (NORVASC) 5 mg tablet TAKE 1 TABLET BY MOUTH EVERY DAY 90 tablet 1    atorvastatin (LIPITOR) 40 mg tablet TAKE 1 TABLET BY MOUTH 4 DAYS PER WEEK 54 tablet 1    Calcium Carbonate-Vitamin D (CALCIUM CREAMIES PO) Take 600 mg by mouth daily      Cholecalciferol (CVS VITAMIN D3) 1000 units capsule Take 2,000 Units by mouth      clonazePAM (KlonoPIN) 1 mg tablet Take half a tablet (0 5 mg) twice a day as needed for anxiety 30 tablet 2    Cyanocobalamin 1000 MCG/ML LIQD cyanocobalamin (vit B-12) 1,000 mcg/mL injection solution      ferrous sulfate 325 (65 Fe) mg tablet Take by oral route        fluticasone (FLONASE) 50 mcg/act nasal spray USE 2 SPRAYS IN EACH NOSTRIL EVERY DAY 3 Bottle 3    levothyroxine 125 mcg tablet TAKE 1 TABLET BY MOUTH EVERY DAY 90 tablet 1    lidocaine (LIDODERM) 5 % Apply 1-2 patches daily as needed, remove in 12 hours 60 patch 5    metaxalone (SKELAXIN) 800 mg tablet Take 1 tablet (800 mg total) by mouth 2 (two) times a day as needed for muscle spasms 60 tablet 2    nebivolol (BYSTOLIC) 10 mg tablet Take 20 mg by mouth daily      omega-3-acid ethyl esters (LOVAZA) 1 g capsule Take 2 g by mouth daily      pantoprazole (PROTONIX) 40 mg tablet Take 1 tablet (40 mg total) by mouth daily (Patient taking differently: Take 40 mg by mouth as needed ) 30 tablet 1    sertraline (ZOLOFT) 100 mg tablet TAKE 2 TABLETS BY MOUTH EVERY  tablet 1    tiZANidine (ZANAFLEX) 4 mg tablet TAKE 2 TABLETS BY MOUTH AT BEDTIME AS NEEDED FOR MUSCLE SPASMS 180 tablet 1    HYDROcodone-acetaminophen (NORCO) 5-325 mg per tablet Take 1 tablet 3 times a day as needed for severe low back pain 30 tablet 0     Current Facility-Administered Medications   Medication Dose Route Frequency Provider Last Rate Last Dose    cyanocobalamin injection 1,000 mcg  1,000 mcg Intramuscular Q30 Days Wesley Ramos MD   1,000 mcg at 02/21/18 1534    cyanocobalamin injection 1,000 mcg  1,000 mcg Intramuscular Q30 Days Wesley Ramos MD   1,000 mcg at 03/29/18 6968    cyanocobalamin injection 1,000 mcg  1,000 mcg Intramuscular Q30 Days Albert Sweeney MD   1,000 mcg at 04/30/18 1737    cyanocobalamin injection 1,000 mcg  1,000 mcg Intramuscular F06 Days Jessica Eldridge MD   0,602 mcg at 08/10/18 1017    cyanocobalamin injection 1,000 mcg  1,000 mcg Intramuscular Q30 Days Albert Sweeney MD   1,000 mcg at 09/10/18 1127    cyanocobalamin injection 1,000 mcg  1,000 mcg Intramuscular Q30 Days Albert Sweeney MD   1,000 mcg at 02/11/19 0412    cyanocobalamin injection 1,000 mcg  1,000 mcg Intramuscular Q30 Days Albert Sweeney MD   1,000 mcg at 09/23/19 1739    cyanocobalamin injection 1,000 mcg  1,000 mcg Intramuscular Q30 Days Albert Sweeney MD   1,000 mcg at 10/29/19 2010    cyanocobalamin injection 1,000 mcg  1,000 mcg Intramuscular Q30 Days Rosetta Avitia MD   1,000 mcg at 12/06/19 1513    cyanocobalamin injection 1,000 mcg  1,000 mcg Intramuscular Q30 Days Albert Sweeney MD   1,000 mcg at 07/02/20 1412     Allergies   Allergen Reactions    Acetaminophen     Cephalosporins Other (See Comments)     GI Upset (vomit/diarrhea)    Medical Tape Rash    Metformin      Other reaction(s): Nausea and/or vomiting    Nabumetone GI Intolerance    Other GI Intolerance    Anesthesia S-I-40 [Propofol]     Celecoxib     Cephalexin     Choline Magnesium Trisalicylate     Diclofenac     Diclofenac Potassium GI Intolerance    Diclofenac Sodium GI Intolerance    Fentanyl     Propoxyphene Other (See Comments)     unknown    Sulfa Antibiotics     Sulfacetamide     Zolpidem       Immunizations:     Immunization History   Administered Date(s) Administered    INFLUENZA 10/08/2007, 10/24/2008, 10/13/2009, 10/17/2011, 10/13/2012, 10/21/2013, 11/05/2014, 10/02/2015, 10/13/2016, 10/10/2017, 10/10/2017    Influenza, recombinant, quadrivalent,injectable, preservative free 10/10/2018    Pneumococcal Conjugate 13-Valent 05/09/2019    Td (adult), Unspecified 08/12/1993    Tdap 02/17/2008, 04/22/2019    Zoster 04/29/2014      Health Maintenance:         Topic Date Due    MAMMOGRAM  1955    DXA SCAN  1955    Cervical Cancer Screening  04/09/1975    CRC Screening: Colonoscopy  08/28/2023    Hepatitis C Screening  Completed         Topic Date Due    Pneumococcal Vaccine: 65+ Years (2 of 2 - PPSV23) 05/09/2020      Medicare Health Risk Assessment:     /72   Pulse 69   Temp 97 6 °F (36 4 °C) (Temporal)   Resp 16   Ht 5' (1 524 m)   Wt 71 9 kg (158 lb 9 6 oz)   SpO2 96%   BMI 30 97 kg/m²          Health Risk Assessment:   Patient rates overall health as excellent  Patient feels that their physical health rating is much better  Eyesight was rated as slightly worse  Hearing was rated as slightly worse  Patient feels that their emotional and mental health rating is same  Pain experienced in the last 7 days has been some  Patient's pain rating has been 5/10  Patient states that she has experienced no weight loss or gain in last 6 months  Depression Screening:   PHQ-2 Score: 0  PHQ-9 Score: 0      Fall Risk Screening: In the past year, patient has experienced: no history of falling in past year      Urinary Incontinence Screening:   Patient has leaked urine accidently in the last six months  Home Safety:  Patient does not have trouble with stairs inside or outside of their home  Patient has working smoke alarms and has working carbon monoxide detector  Home safety hazards include: none  Nutrition:   Current diet is Regular  Medications:   Patient is currently taking over-the-counter supplements  OTC medications include: see medication list  Patient is able to manage medications  Activities of Daily Living (ADLs)/Instrumental Activities of Daily Living (IADLs):   Walk and transfer into and out of bed and chair?: Yes  Dress and groom yourself?: Yes    Bathe or shower yourself?: Yes    Feed yourself?  Yes  Do your laundry/housekeeping?: Yes  Manage your money, pay your bills and track your expenses?: Yes  Make your own meals?: Yes    Do your own shopping?: Yes    Previous Hospitalizations:   Any hospitalizations or ED visits within the last 12 months?: No      Advance Care Planning:   Living will: Yes    Durable POA for healthcare: Yes    Advanced directive: Yes      Cognitive Screening:   Provider or family/friend/caregiver concerned regarding cognition?: No    PREVENTIVE SCREENINGS      Cardiovascular Screening:    General: Screening Not Indicated and History Lipid Disorder      Diabetes Screening:     General: Screening Current      Colorectal Cancer Screening:     General: Screening Current    Due for: Cologuard      Breast Cancer Screening:     General: Risks and Benefits Discussed    Due for: Mammogram        Cervical Cancer Screening:    General: Screening Not Indicated      Osteoporosis Screening:    General: Screening Not Indicated      Abdominal Aortic Aneurysm (AAA) Screening:        General: Screening Not Indicated      Lung Cancer Screening:     General: Screening Not Indicated      Hepatitis C Screening:    General: Screening Current    Other Counseling Topics:   Calcium and vitamin D intake and regular weightbearing exercise         Sylvia Luna MD

## 2020-07-15 NOTE — PATIENT INSTRUCTIONS
Medicare Preventive Visit Patient Instructions  Thank you for completing your Welcome to Medicare Visit or Medicare Annual Wellness Visit today  Your next wellness visit will be due in one year (7/15/2021)  The screening/preventive services that you may require over the next 5-10 years are detailed below  Some tests may not apply to you based off risk factors and/or age  Screening tests ordered at today's visit but not completed yet may show as past due  Also, please note that scanned in results may not display below  Preventive Screenings:  Service Recommendations Previous Testing/Comments   Colorectal Cancer Screening  * Colonoscopy    * Fecal Occult Blood Test (FOBT)/Fecal Immunochemical Test (FIT)  * Fecal DNA/Cologuard Test  * Flexible Sigmoidoscopy Age: 54-65 years old   Colonoscopy: every 10 years (may be performed more frequently if at higher risk)  OR  FOBT/FIT: every 1 year  OR  Cologuard: every 3 years  OR  Sigmoidoscopy: every 5 years  Screening may be recommended earlier than age 48 if at higher risk for colorectal cancer  Also, an individualized decision between you and your healthcare provider will decide whether screening between the ages of 74-80 would be appropriate  Colonoscopy: 08/28/2013  FOBT/FIT: Not on file  Cologuard: Not on file  Sigmoidoscopy: Not on file    Screening Current     Breast Cancer Screening Age: 36 years old  Frequency: every 1-2 years  Not required if history of left and right mastectomy Mammogram: Not on file       Cervical Cancer Screening Between the ages of 21-29, pap smear recommended once every 3 years  Between the ages of 33-67, can perform pap smear with HPV co-testing every 5 years     Recommendations may differ for women with a history of total hysterectomy, cervical cancer, or abnormal pap smears in past  Pap Smear: Not on file    Screening Not Indicated   Hepatitis C Screening Once for adults born between 1945 and 1965  More frequently in patients at high risk for Hepatitis C Hep C Antibody: 02/21/2017    Screening Current   Diabetes Screening 1-2 times per year if you're at risk for diabetes or have pre-diabetes Fasting glucose: 105 mg/dL   A1C: No results in last 5 years    Screening Current   Cholesterol Screening Once every 5 years if you don't have a lipid disorder  May order more often based on risk factors  Lipid panel: 11/22/2019    Screening Not Indicated  History Lipid Disorder     Other Preventive Screenings Covered by Medicare:  1  Abdominal Aortic Aneurysm (AAA) Screening: covered once if your at risk  You're considered to be at risk if you have a family history of AAA  2  Lung Cancer Screening: covers low dose CT scan once per year if you meet all of the following conditions: (1) Age 50-69; (2) No signs or symptoms of lung cancer; (3) Current smoker or have quit smoking within the last 15 years; (4) You have a tobacco smoking history of at least 30 pack years (packs per day multiplied by number of years you smoked); (5) You get a written order from a healthcare provider  3  Glaucoma Screening: covered annually if you're considered high risk: (1) You have diabetes OR (2) Family history of glaucoma OR (3)  aged 48 and older OR (3)  American aged 72 and older  3  Osteoporosis Screening: covered every 2 years if you meet one of the following conditions: (1) You're estrogen deficient and at risk for osteoporosis based off medical history and other findings; (2) Have a vertebral abnormality; (3) On glucocorticoid therapy for more than 3 months; (4) Have primary hyperparathyroidism; (5) On osteoporosis medications and need to assess response to drug therapy  · Last bone density test (DXA Scan): Not on file  5  HIV Screening: covered annually if you're between the age of 12-76  Also covered annually if you are younger than 13 and older than 72 with risk factors for HIV infection   For pregnant patients, it is covered up to 3 times per pregnancy  Immunizations:  Immunization Recommendations   Influenza Vaccine Annual influenza vaccination during flu season is recommended for all persons aged >= 6 months who do not have contraindications   Pneumococcal Vaccine (Prevnar and Pneumovax)  * Prevnar = PCV13  * Pneumovax = PPSV23   Adults 25-60 years old: 1-3 doses may be recommended based on certain risk factors  Adults 72 years old: Prevnar (PCV13) vaccine recommended followed by Pneumovax (PPSV23) vaccine  If already received PPSV23 since turning 65, then PCV13 recommended at least one year after PPSV23 dose  Hepatitis B Vaccine 3 dose series if at intermediate or high risk (ex: diabetes, end stage renal disease, liver disease)   Tetanus (Td) Vaccine - COST NOT COVERED BY MEDICARE PART B Following completion of primary series, a booster dose should be given every 10 years to maintain immunity against tetanus  Td may also be given as tetanus wound prophylaxis  Tdap Vaccine - COST NOT COVERED BY MEDICARE PART B Recommended at least once for all adults  For pregnant patients, recommended with each pregnancy  Shingles Vaccine (Shingrix) - COST NOT COVERED BY MEDICARE PART B  2 shot series recommended in those aged 48 and above     Health Maintenance Due:      Topic Date Due    MAMMOGRAM  1955    DXA SCAN  1955    Cervical Cancer Screening  04/09/1975    CRC Screening: Colonoscopy  08/28/2023    Hepatitis C Screening  Completed     Immunizations Due:      Topic Date Due    Pneumococcal Vaccine: 65+ Years (2 of 2 - PPSV23) 05/09/2020     Advance Directives   What are advance directives? Advance directives are legal documents that state your wishes and plans for medical care  These plans are made ahead of time in case you lose your ability to make decisions for yourself  Advance directives can apply to any medical decision, such as the treatments you want, and if you want to donate organs     What are the types of advance directives? There are many types of advance directives, and each state has rules about how to use them  You may choose a combination of any of the following:  · Living will: This is a written record of the treatment you want  You can also choose which treatments you do not want, which to limit, and which to stop at a certain time  This includes surgery, medicine, IV fluid, and tube feedings  · Durable power of  for healthcare [facility]): This is a written record that states who you want to make healthcare choices for you when you are unable to make them for yourself  This person, called a proxy, is usually a family member or a friend  You may choose more than 1 proxy  · Do not resuscitate (DNR) order:  A DNR order is used in case your heart stops beating or you stop breathing  It is a request not to have certain forms of treatment, such as CPR  A DNR order may be included in other types of advance directives  · Medical directive: This covers the care that you want if you are in a coma, near death, or unable to make decisions for yourself  You can list the treatments you want for each condition  Treatment may include pain medicine, surgery, blood transfusions, dialysis, IV or tube feedings, and a ventilator (breathing machine)  · Values history: This document has questions about your views, beliefs, and how you feel and think about life  This information can help others choose the care that you would choose  Why are advance directives important? An advance directive helps you control your care  Although spoken wishes may be used, it is better to have your wishes written down  Spoken wishes can be misunderstood, or not followed  Treatments may be given even if you do not want them  An advance directive may make it easier for your family to make difficult choices about your care  Urinary Incontinence   Urinary incontinence (UI)  is when you lose control of your bladder   UI develops because your bladder cannot store or empty urine properly  The 3 most common types of UI are stress incontinence, urge incontinence, or both  Medicines:   · May be given to help strengthen your bladder control  Report any side effects of medication to your healthcare provider  Do pelvic muscle exercises often:  Your pelvic muscles help you stop urinating  Squeeze these muscles tight for 5 seconds, then relax for 5 seconds  Gradually work up to squeezing for 10 seconds  Do 3 sets of 15 repetitions a day, or as directed  This will help strengthen your pelvic muscles and improve bladder control  Train your bladder:  Go to the bathroom at set times, such as every 2 hours, even if you do not feel the urge to go  You can also try to hold your urine when you feel the urge to go  For example, hold your urine for 5 minutes when you feel the urge to go  As that becomes easier, hold your urine for 10 minutes  Self-care:   · Keep a UI record  Write down how often you leak urine and how much you leak  Make a note of what you were doing when you leaked urine  · Drink liquids as directed  You may need to limit the amount of liquid you drink to help control your urine leakage  Do not drink any liquid right before you go to bed  Limit or do not have drinks that contain caffeine or alcohol  · Prevent constipation  Eat a variety of high-fiber foods  Good examples are high-fiber cereals, beans, vegetables, and whole-grain breads  Walking is the best way to trigger your intestines to have a bowel movement  · Exercise regularly and maintain a healthy weight  Weight loss and exercise will decrease pressure on your bladder and help you control your leakage  · Use a catheter as directed  to help empty your bladder  A catheter is a tiny, plastic tube that is put into your bladder to drain your urine  · Go to behavior therapy as directed  Behavior therapy may be used to help you learn to control your urge to urinate      Weight Management Why it is important to manage your weight:  Being overweight increases your risk of health conditions such as heart disease, high blood pressure, type 2 diabetes, and certain types of cancer  It can also increase your risk for osteoarthritis, sleep apnea, and other respiratory problems  Aim for a slow, steady weight loss  Even a small amount of weight loss can lower your risk of health problems  How to lose weight safely:  A safe and healthy way to lose weight is to eat fewer calories and get regular exercise  You can lose up about 1 pound a week by decreasing the number of calories you eat by 500 calories each day  Healthy meal plan for weight management:  A healthy meal plan includes a variety of foods, contains fewer calories, and helps you stay healthy  A healthy meal plan includes the following:  · Eat whole-grain foods more often  A healthy meal plan should contain fiber  Fiber is the part of grains, fruits, and vegetables that is not broken down by your body  Whole-grain foods are healthy and provide extra fiber in your diet  Some examples of whole-grain foods are whole-wheat breads and pastas, oatmeal, brown rice, and bulgur  · Eat a variety of vegetables every day  Include dark, leafy greens such as spinach, kale, dima greens, and mustard greens  Eat yellow and orange vegetables such as carrots, sweet potatoes, and winter squash  · Eat a variety of fruits every day  Choose fresh or canned fruit (canned in its own juice or light syrup) instead of juice  Fruit juice has very little or no fiber  · Eat low-fat dairy foods  Drink fat-free (skim) milk or 1% milk  Eat fat-free yogurt and low-fat cottage cheese  Try low-fat cheeses such as mozzarella and other reduced-fat cheeses  · Choose meat and other protein foods that are low in fat  Choose beans or other legumes such as split peas or lentils  Choose fish, skinless poultry (chicken or turkey), or lean cuts of red meat (beef or pork)   Before you cook meat or poultry, cut off any visible fat  · Use less fat and oil  Try baking foods instead of frying them  Add less fat, such as margarine, sour cream, regular salad dressing and mayonnaise to foods  Eat fewer high-fat foods  Some examples of high-fat foods include french fries, doughnuts, ice cream, and cakes  · Eat fewer sweets  Limit foods and drinks that are high in sugar  This includes candy, cookies, regular soda, and sweetened drinks  Exercise:  Exercise at least 30 minutes per day on most days of the week  Some examples of exercise include walking, biking, dancing, and swimming  You can also fit in more physical activity by taking the stairs instead of the elevator or parking farther away from stores  Ask your healthcare provider about the best exercise plan for you  © Copyright [employer] 2018 Information is for End User's use only and may not be sold, redistributed or otherwise used for commercial purposes   All illustrations and images included in CareNotes® are the copyrighted property of A [employer] M [employer]  or [address] [place] [address]

## 2020-07-17 DIAGNOSIS — M51.36 DDD (DEGENERATIVE DISC DISEASE), LUMBAR: ICD-10-CM

## 2020-07-17 DIAGNOSIS — F41.1 GAD (GENERALIZED ANXIETY DISORDER): ICD-10-CM

## 2020-07-17 PROCEDURE — 93000 ELECTROCARDIOGRAM COMPLETE: CPT | Performed by: FAMILY MEDICINE

## 2020-07-17 RX ORDER — CLONAZEPAM 1 MG/1
TABLET ORAL
Qty: 30 TABLET | Refills: 2 | Status: SHIPPED | OUTPATIENT
Start: 2020-07-17 | End: 2021-01-15 | Stop reason: SDUPTHER

## 2020-07-17 RX ORDER — CLONAZEPAM 1 MG/1
0.5 TABLET ORAL DAILY
Qty: 15 TABLET | Refills: 5 | OUTPATIENT
Start: 2020-07-17

## 2020-07-17 RX ORDER — HYDROCODONE BITARTRATE AND ACETAMINOPHEN 5; 325 MG/1; MG/1
TABLET ORAL
Qty: 30 TABLET | Refills: 0 | Status: SHIPPED | OUTPATIENT
Start: 2020-07-17 | End: 2020-08-18 | Stop reason: SDUPTHER

## 2020-07-19 ENCOUNTER — TELEPHONE (OUTPATIENT)
Dept: FAMILY MEDICINE CLINIC | Facility: CLINIC | Age: 65
End: 2020-07-19

## 2020-07-19 PROBLEM — F41.1 GAD (GENERALIZED ANXIETY DISORDER): Status: ACTIVE | Noted: 2020-07-19

## 2020-07-19 PROBLEM — E53.8 VITAMIN B 12 DEFICIENCY: Status: ACTIVE | Noted: 2020-07-19

## 2020-07-19 PROBLEM — R74.8 ELEVATED ALKALINE PHOSPHATASE LEVEL: Status: RESOLVED | Noted: 2018-05-08 | Resolved: 2020-07-19

## 2020-07-19 NOTE — ASSESSMENT & PLAN NOTE
· Patient is under care of Cardiology   · Patient reports no symptoms of palpitations, dyspnea, chest pain or dizziness  · EKG performed in the office today is unremarkable,will forward results to Cardiology group    · Continue Bystolic 5 mg daily

## 2020-07-19 NOTE — ASSESSMENT & PLAN NOTE
· History of thyroid nodules, I again  advised patient to proceed with thyroid ultrasound  · Continue levothyroxine 125 mcg daily    · Monitor TSH

## 2020-07-19 NOTE — ASSESSMENT & PLAN NOTE
· Chronic symptoms of depression anxiety  · Continue Zoloft 200 mg daily  · Patient reports worsening of anxiety symptoms lately, will increase dose of clonazepam to 0 5 mg b i d  p r n

## 2020-07-19 NOTE — ASSESSMENT & PLAN NOTE
· Chronic severe arthritic spine pain due to scoliosis and multilevel disc disease and stenosis of cervical and lumbar spine  · Patient remains on regimen of muscle relaxants on an as-needed basis  · She was not able to attend chiropractic treatments due to COVID-19 endemic and reports worsening of her pain  · Patient uses hydrocodone sparingly as needed, no history of misuse

## 2020-08-18 DIAGNOSIS — M51.36 DDD (DEGENERATIVE DISC DISEASE), LUMBAR: ICD-10-CM

## 2020-08-18 DIAGNOSIS — K21.9 GASTROESOPHAGEAL REFLUX DISEASE WITHOUT ESOPHAGITIS: ICD-10-CM

## 2020-08-18 NOTE — TELEPHONE ENCOUNTER
Can you please confirm patient is allergic to acetaminophen? I am trying to fill her Norco and is giving me a hard stop?   Is the allergy not severe

## 2020-08-18 NOTE — TELEPHONE ENCOUNTER
It looks like the [facility] [place] was filled yesterday? Can you please confirm this with the pharmacy?   Thank you

## 2020-08-19 ENCOUNTER — CLINICAL SUPPORT (OUTPATIENT)
Dept: FAMILY MEDICINE CLINIC | Facility: CLINIC | Age: 65
End: 2020-08-19
Payer: MEDICARE

## 2020-08-19 DIAGNOSIS — E53.8 VITAMIN B 12 DEFICIENCY: Primary | ICD-10-CM

## 2020-08-19 PROCEDURE — 96372 THER/PROPH/DIAG INJ SC/IM: CPT

## 2020-08-19 PROCEDURE — 3078F DIAST BP <80 MM HG: CPT

## 2020-08-19 PROCEDURE — 3074F SYST BP LT 130 MM HG: CPT

## 2020-08-19 PROCEDURE — 4040F PNEUMOC VAC/ADMIN/RCVD: CPT

## 2020-08-19 RX ORDER — HYDROCODONE BITARTRATE AND ACETAMINOPHEN 5; 325 MG/1; MG/1
TABLET ORAL
Qty: 30 TABLET | Refills: 0 | Status: SHIPPED | OUTPATIENT
Start: 2020-08-19 | End: 2020-09-17 | Stop reason: SDUPTHER

## 2020-08-19 RX ORDER — PANTOPRAZOLE SODIUM 40 MG/1
40 TABLET, DELAYED RELEASE ORAL DAILY
Qty: 30 TABLET | Refills: 1 | Status: SHIPPED | OUTPATIENT
Start: 2020-08-19 | End: 2020-09-11

## 2020-08-19 RX ADMIN — CYANOCOBALAMIN 1000 MCG: 1000 INJECTION INTRAMUSCULAR; SUBCUTANEOUS at 15:08

## 2020-08-19 NOTE — TELEPHONE ENCOUNTER
Spoke with pt , pt stated that she is NOT allergy to  acetaminophen, pt has taking this medication for years

## 2020-08-20 RX ORDER — CYANOCOBALAMIN 1000 UG/ML
1000 INJECTION INTRAMUSCULAR; SUBCUTANEOUS ONCE
Status: COMPLETED | OUTPATIENT
Start: 2020-08-20 | End: 2020-08-19

## 2020-09-11 DIAGNOSIS — K21.9 GASTROESOPHAGEAL REFLUX DISEASE WITHOUT ESOPHAGITIS: ICD-10-CM

## 2020-09-11 RX ORDER — PANTOPRAZOLE SODIUM 40 MG/1
TABLET, DELAYED RELEASE ORAL
Qty: 30 TABLET | Refills: 6 | Status: SHIPPED | OUTPATIENT
Start: 2020-09-11

## 2020-09-17 DIAGNOSIS — M51.36 DDD (DEGENERATIVE DISC DISEASE), LUMBAR: ICD-10-CM

## 2020-09-17 RX ORDER — HYDROCODONE BITARTRATE AND ACETAMINOPHEN 5; 325 MG/1; MG/1
TABLET ORAL
Qty: 30 TABLET | Refills: 0 | Status: SHIPPED | OUTPATIENT
Start: 2020-09-17 | End: 2020-10-15 | Stop reason: SDUPTHER

## 2020-10-08 DIAGNOSIS — M51.9 DISC DISORDER OF LUMBAR REGION: ICD-10-CM

## 2020-10-08 RX ORDER — METAXALONE 800 MG/1
TABLET ORAL
Qty: 60 TABLET | Refills: 4 | Status: SHIPPED | OUTPATIENT
Start: 2020-10-08 | End: 2021-09-01

## 2020-10-09 DIAGNOSIS — F41.1 GAD (GENERALIZED ANXIETY DISORDER): ICD-10-CM

## 2020-10-09 RX ORDER — SERTRALINE HYDROCHLORIDE 100 MG/1
TABLET, FILM COATED ORAL
Qty: 180 TABLET | Refills: 1 | Status: SHIPPED | OUTPATIENT
Start: 2020-10-09 | End: 2020-12-11 | Stop reason: SDUPTHER

## 2020-10-11 DIAGNOSIS — E03.9 HYPOTHYROIDISM, UNSPECIFIED TYPE: ICD-10-CM

## 2020-10-11 RX ORDER — LEVOTHYROXINE SODIUM 0.12 MG/1
TABLET ORAL
Qty: 30 TABLET | Refills: 0 | Status: SHIPPED | OUTPATIENT
Start: 2020-10-11 | End: 2020-11-10

## 2020-10-15 DIAGNOSIS — M51.36 DDD (DEGENERATIVE DISC DISEASE), LUMBAR: ICD-10-CM

## 2020-10-18 RX ORDER — HYDROCODONE BITARTRATE AND ACETAMINOPHEN 5; 325 MG/1; MG/1
TABLET ORAL
Qty: 30 TABLET | Refills: 0 | Status: SHIPPED | OUTPATIENT
Start: 2020-10-18 | End: 2020-11-17 | Stop reason: SDUPTHER

## 2020-10-18 RX ORDER — NALOXONE HYDROCHLORIDE 4 MG/.1ML
SPRAY NASAL
Qty: 1 EACH | Refills: 1 | Status: SHIPPED | OUTPATIENT
Start: 2020-10-18 | End: 2022-03-31

## 2020-10-25 ENCOUNTER — OFFICE VISIT (OUTPATIENT)
Dept: URGENT CARE | Facility: CLINIC | Age: 65
End: 2020-10-25
Payer: MEDICARE

## 2020-10-25 ENCOUNTER — APPOINTMENT (EMERGENCY)
Dept: RADIOLOGY | Facility: HOSPITAL | Age: 65
End: 2020-10-25
Payer: MEDICARE

## 2020-10-25 ENCOUNTER — HOSPITAL ENCOUNTER (EMERGENCY)
Facility: HOSPITAL | Age: 65
Discharge: HOME | End: 2020-10-25
Attending: EMERGENCY MEDICINE | Admitting: EMERGENCY MEDICINE
Payer: MEDICARE

## 2020-10-25 VITALS
WEIGHT: 159 LBS | SYSTOLIC BLOOD PRESSURE: 145 MMHG | DIASTOLIC BLOOD PRESSURE: 78 MMHG | RESPIRATION RATE: 18 BRPM | TEMPERATURE: 97.6 F | OXYGEN SATURATION: 98 % | BODY MASS INDEX: 31.05 KG/M2 | HEART RATE: 58 BPM

## 2020-10-25 VITALS
BODY MASS INDEX: 31.33 KG/M2 | SYSTOLIC BLOOD PRESSURE: 140 MMHG | HEART RATE: 56 BPM | HEIGHT: 60 IN | WEIGHT: 159.6 LBS | RESPIRATION RATE: 16 BRPM | OXYGEN SATURATION: 96 % | DIASTOLIC BLOOD PRESSURE: 71 MMHG | TEMPERATURE: 98.7 F
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
## 2020-10-25 DIAGNOSIS — S61.451A DOG BITE OF RIGHT HAND, INITIAL ENCOUNTER: Primary | ICD-10-CM
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
## 2020-10-25 LAB
ANION GAP SERPL CALCULATED.3IONS-SCNC: 5 MMOL/L (ref 4–13)
BASOPHILS # BLD AUTO: 0.03 THOUSANDS/ΜL (ref 0–0.1)
BASOPHILS NFR BLD AUTO: 0 % (ref 0–1)
BUN SERPL-MCNC: 21 MG/DL (ref 5–25)
CALCIUM SERPL-MCNC: 8.9 MG/DL (ref 8.3–10.1)
CHLORIDE SERPL-SCNC: 103 MMOL/L (ref 100–108)
CO2 SERPL-SCNC: 33 MMOL/L (ref 21–32)
CREAT SERPL-MCNC: 0.72 MG/DL (ref 0.6–1.3)
EOSINOPHIL # BLD AUTO: 0.2 THOUSAND/ΜL (ref 0–0.61)
EOSINOPHIL NFR BLD AUTO: 2 % (ref 0–6)
ERYTHROCYTE [DISTWIDTH] IN BLOOD BY AUTOMATED COUNT: 12.4 % (ref 11.6–15.1)
GFR SERPL CREATININE-BSD FRML MDRD: 88 ML/MIN/1.73SQ M
GLUCOSE SERPL-MCNC: 112 MG/DL (ref 65–140)
HCT VFR BLD AUTO: 37.2 % (ref 34.8–46.1)
HGB BLD-MCNC: 12.6 G/DL (ref 11.5–15.4)
IMM GRANULOCYTES # BLD AUTO: 0.02 THOUSAND/UL (ref 0–0.2)
IMM GRANULOCYTES NFR BLD AUTO: 0 % (ref 0–2)
LYMPHOCYTES # BLD AUTO: 1.91 THOUSANDS/ΜL (ref 0.6–4.47)
LYMPHOCYTES NFR BLD AUTO: 21 % (ref 14–44)
MCH RBC QN AUTO: 31.7 PG (ref 26.8–34.3)
MCHC RBC AUTO-ENTMCNC: 33.9 G/DL (ref 31.4–37.4)
MCV RBC AUTO: 94 FL (ref 82–98)
MONOCYTES # BLD AUTO: 0.63 THOUSAND/ΜL (ref 0.17–1.22)
MONOCYTES NFR BLD AUTO: 7 % (ref 4–12)
NEUTROPHILS # BLD AUTO: 6.38 THOUSANDS/ΜL (ref 1.85–7.62)
NEUTS SEG NFR BLD AUTO: 70 % (ref 43–75)
NRBC BLD AUTO-RTO: 0 /100 WBCS
PLATELET # BLD AUTO: 197 THOUSANDS/UL (ref 149–390)
PMV BLD AUTO: 9.2 FL (ref 8.9–12.7)
POTASSIUM SERPL-SCNC: 4.2 MMOL/L (ref 3.5–5.3)
RBC # BLD AUTO: 3.97 MILLION/UL (ref 3.81–5.12)
SODIUM SERPL-SCNC: 141 MMOL/L (ref 136–145)
WBC # BLD AUTO: 9.17 THOUSAND/UL (ref 4.31–10.16)

## 2020-10-25 PROCEDURE — 29125 APPL SHORT ARM SPLINT STATIC: CPT | Performed by: EMERGENCY MEDICINE

## 2020-10-25 PROCEDURE — 73130 X-RAY EXAM OF HAND: CPT

## 2020-10-25 PROCEDURE — 85025 COMPLETE CBC W/AUTO DIFF WBC: CPT | Performed by: EMERGENCY MEDICINE

## 2020-10-25 PROCEDURE — G0463 HOSPITAL OUTPT CLINIC VISIT: HCPCS | Performed by: PHYSICIAN ASSISTANT

## 2020-10-25 PROCEDURE — 99284 EMERGENCY DEPT VISIT MOD MDM: CPT | Performed by: EMERGENCY MEDICINE

## 2020-10-25 PROCEDURE — 80048 BASIC METABOLIC PNL TOTAL CA: CPT | Performed by: EMERGENCY MEDICINE

## 2020-10-25 PROCEDURE — 96365 THER/PROPH/DIAG IV INF INIT: CPT

## 2020-10-25 PROCEDURE — 87040 BLOOD CULTURE FOR BACTERIA: CPT | Performed by: EMERGENCY MEDICINE

## 2020-10-25 PROCEDURE — 99213 OFFICE O/P EST LOW 20 MIN: CPT | Performed by: PHYSICIAN ASSISTANT

## 2020-10-25 PROCEDURE — 36415 COLL VENOUS BLD VENIPUNCTURE: CPT | Performed by: EMERGENCY MEDICINE

## 2020-10-25 PROCEDURE — 99283 EMERGENCY DEPT VISIT LOW MDM: CPT

## 2020-10-25 RX ORDER — GINSENG 100 MG
1 CAPSULE ORAL ONCE
Status: DISCONTINUED | OUTPATIENT
Start: 2020-10-25 | End: 2020-10-25 | Stop reason: HOSPADM

## 2020-10-25 RX ORDER — AMOXICILLIN AND CLAVULANATE POTASSIUM 875; 125 MG/1; MG/1
1 TABLET, FILM COATED ORAL EVERY 12 HOURS
Qty: 20 TABLET | Refills: 0 | Status: SHIPPED | OUTPATIENT
Start: 2020-10-26 | End: 2020-11-05

## 2020-10-25 RX ADMIN — PIPERACILLIN AND TAZOBACTAM 3.38 G: 3; .375 INJECTION, POWDER, LYOPHILIZED, FOR SOLUTION INTRAVENOUS at 18:00

## 2020-10-28 ENCOUNTER — HOSPITAL ENCOUNTER (OUTPATIENT)
Dept: MAMMOGRAPHY | Facility: CLINIC | Age: 65
Discharge: HOME | End: 2020-10-28
Payer: MEDICARE

## 2020-10-28 VITALS — HEIGHT: 60 IN | WEIGHT: 159 LBS | BODY MASS INDEX: 31.22 KG/M2

## 2020-10-28 DIAGNOSIS — Z12.39 SCREENING FOR BREAST CANCER: ICD-10-CM

## 2020-10-28 DIAGNOSIS — Z12.31 SCREENING MAMMOGRAM, ENCOUNTER FOR: ICD-10-CM

## 2020-10-28 PROCEDURE — 77063 BREAST TOMOSYNTHESIS BI: CPT

## 2020-10-28 PROCEDURE — 77067 SCR MAMMO BI INCL CAD: CPT

## 2020-10-29 ENCOUNTER — LAB (OUTPATIENT)
Dept: LAB | Facility: HOSPITAL | Age: 65
End: 2020-10-29
Payer: MEDICARE

## 2020-10-29 ENCOUNTER — OFFICE VISIT (OUTPATIENT)
Dept: OBGYN CLINIC | Facility: CLINIC | Age: 65
End: 2020-10-29
Payer: MEDICARE

## 2020-10-29 ENCOUNTER — TELEPHONE (OUTPATIENT)
Dept: FAMILY MEDICINE CLINIC | Facility: CLINIC | Age: 65
End: 2020-10-29

## 2020-10-29 VITALS
BODY MASS INDEX: 30.82 KG/M2 | SYSTOLIC BLOOD PRESSURE: 118 MMHG | HEIGHT: 60 IN | TEMPERATURE: 98.9 F | DIASTOLIC BLOOD PRESSURE: 72 MMHG | WEIGHT: 157 LBS

## 2020-10-29 DIAGNOSIS — E53.8 VITAMIN B 12 DEFICIENCY: ICD-10-CM

## 2020-10-29 DIAGNOSIS — I10 BENIGN ESSENTIAL HYPERTENSION: ICD-10-CM

## 2020-10-29 DIAGNOSIS — E55.9 VITAMIN D DEFICIENCY: ICD-10-CM
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
## 2020-10-29 DIAGNOSIS — S61.451A DOG BITE OF RIGHT HAND WITHOUT COMPLICATION, INITIAL ENCOUNTER: Primary | ICD-10-CM

## 2020-10-29 DIAGNOSIS — E78.2 MIXED HYPERLIPIDEMIA: ICD-10-CM

## 2020-10-29 DIAGNOSIS — E03.9 HYPOTHYROIDISM, UNSPECIFIED TYPE: ICD-10-CM

## 2020-10-29 PROBLEM — S61.259A: Status: ACTIVE | Noted: 2020-10-29

## 2020-10-29 LAB
25(OH)D3 SERPL-MCNC: 34.9 NG/ML (ref 30–100)
ALBUMIN SERPL BCP-MCNC: 4.2 G/DL (ref 3.5–5)
ALP SERPL-CCNC: 93 U/L (ref 46–116)
ALT SERPL W P-5'-P-CCNC: 53 U/L (ref 12–78)
ANION GAP SERPL CALCULATED.3IONS-SCNC: 3 MMOL/L (ref 4–13)
AST SERPL W P-5'-P-CCNC: 36 U/L (ref 5–45)
BASOPHILS # BLD AUTO: 0.03 THOUSANDS/ΜL (ref 0–0.1)
BASOPHILS NFR BLD AUTO: 0 % (ref 0–1)
BILIRUB SERPL-MCNC: 0.47 MG/DL (ref 0.2–1)
BUN SERPL-MCNC: 22 MG/DL (ref 5–25)
CALCIUM SERPL-MCNC: 8.9 MG/DL (ref 8.3–10.1)
CHLORIDE SERPL-SCNC: 106 MMOL/L (ref 100–108)
CHOLEST SERPL-MCNC: 175 MG/DL (ref 50–200)
CO2 SERPL-SCNC: 29 MMOL/L (ref 21–32)
CREAT SERPL-MCNC: 0.72 MG/DL (ref 0.6–1.3)
EOSINOPHIL # BLD AUTO: 0.15 THOUSAND/ΜL (ref 0–0.61)
EOSINOPHIL NFR BLD AUTO: 2 % (ref 0–6)
ERYTHROCYTE [DISTWIDTH] IN BLOOD BY AUTOMATED COUNT: 12.5 % (ref 11.6–15.1)
GFR SERPL CREATININE-BSD FRML MDRD: 88 ML/MIN/1.73SQ M
GLUCOSE P FAST SERPL-MCNC: 89 MG/DL (ref 65–99)
HCT VFR BLD AUTO: 38 % (ref 34.8–46.1)
HDLC SERPL-MCNC: 47 MG/DL
HGB BLD-MCNC: 12.8 G/DL (ref 11.5–15.4)
IMM GRANULOCYTES # BLD AUTO: 0.03 THOUSAND/UL (ref 0–0.2)
IMM GRANULOCYTES NFR BLD AUTO: 0 % (ref 0–2)
LDLC SERPL CALC-MCNC: 84 MG/DL (ref 0–100)
LYMPHOCYTES # BLD AUTO: 1.31 THOUSANDS/ΜL (ref 0.6–4.47)
LYMPHOCYTES NFR BLD AUTO: 19 % (ref 14–44)
MCH RBC QN AUTO: 31.4 PG (ref 26.8–34.3)
MCHC RBC AUTO-ENTMCNC: 33.7 G/DL (ref 31.4–37.4)
MCV RBC AUTO: 93 FL (ref 82–98)
MONOCYTES # BLD AUTO: 0.39 THOUSAND/ΜL (ref 0.17–1.22)
MONOCYTES NFR BLD AUTO: 6 % (ref 4–12)
NEUTROPHILS # BLD AUTO: 4.92 THOUSANDS/ΜL (ref 1.85–7.62)
NEUTS SEG NFR BLD AUTO: 73 % (ref 43–75)
NRBC BLD AUTO-RTO: 0 /100 WBCS
PLATELET # BLD AUTO: 183 THOUSANDS/UL (ref 149–390)
PMV BLD AUTO: 9.2 FL (ref 8.9–12.7)
POTASSIUM SERPL-SCNC: 4.1 MMOL/L (ref 3.5–5.3)
PROT SERPL-MCNC: 8.2 G/DL (ref 6.4–8.2)
RBC # BLD AUTO: 4.08 MILLION/UL (ref 3.81–5.12)
SODIUM SERPL-SCNC: 138 MMOL/L (ref 136–145)
TRIGL SERPL-MCNC: 219 MG/DL
TSH SERPL DL<=0.05 MIU/L-ACNC: 2.84 UIU/ML (ref 0.36–3.74)
VIT B12 SERPL-MCNC: 464 PG/ML (ref 100–900)
WBC # BLD AUTO: 6.83 THOUSAND/UL (ref 4.31–10.16)

## 2020-10-29 PROCEDURE — 99203 OFFICE O/P NEW LOW 30 MIN: CPT | Performed by: ORTHOPAEDIC SURGERY

## 2020-10-29 PROCEDURE — 82607 VITAMIN B-12: CPT

## 2020-10-29 PROCEDURE — 82306 VITAMIN D 25 HYDROXY: CPT

## 2020-10-29 PROCEDURE — 85025 COMPLETE CBC W/AUTO DIFF WBC: CPT

## 2020-10-29 PROCEDURE — 36415 COLL VENOUS BLD VENIPUNCTURE: CPT

## 2020-10-29 PROCEDURE — 80053 COMPREHEN METABOLIC PANEL: CPT

## 2020-10-29 PROCEDURE — 80061 LIPID PANEL: CPT

## 2020-10-29 PROCEDURE — 84443 ASSAY THYROID STIM HORMONE: CPT

## 2020-10-30 ENCOUNTER — HOSPITAL ENCOUNTER (OUTPATIENT)
Dept: MAMMOGRAPHY | Facility: CLINIC | Age: 65
Discharge: HOME | End: 2020-10-30
Payer: MEDICARE

## 2020-10-30 VITALS — TEMPERATURE: 96.7 F | WEIGHT: 157 LBS | HEIGHT: 60 IN | BODY MASS INDEX: 30.82 KG/M2

## 2020-10-30 DIAGNOSIS — R92.8 ABNORMAL SCREENING MAMMOGRAM: ICD-10-CM

## 2020-10-30 PROCEDURE — 77065 DX MAMMO INCL CAD UNI: CPT

## 2020-10-31 ENCOUNTER — TELEPHONE (OUTPATIENT)
Dept: FAMILY MEDICINE CLINIC | Facility: CLINIC | Age: 65
End: 2020-10-31

## 2020-10-31 DIAGNOSIS — R92.1 CALCIFICATION OF LEFT BREAST ON MAMMOGRAPHY: Primary | ICD-10-CM

## 2020-10-31 PROBLEM — S61.451A: Status: RESOLVED | Noted: 2020-10-29 | Resolved: 2020-10-31
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
## 2020-10-31 LAB
BACTERIA BLD CULT: NORMAL
BACTERIA BLD CULT: NORMAL

## 2020-11-02 ENCOUNTER — TELEPHONE (OUTPATIENT)
Dept: FAMILY MEDICINE CLINIC | Facility: CLINIC | Age: 65
End: 2020-11-02

## 2020-11-04 ENCOUNTER — CLINICAL SUPPORT (OUTPATIENT)
Dept: FAMILY MEDICINE CLINIC | Facility: CLINIC | Age: 65
End: 2020-11-04
Payer: MEDICARE

## 2020-11-04 VITALS — TEMPERATURE: 98.2 F

## 2020-11-04 DIAGNOSIS — E53.8 VITAMIN B 12 DEFICIENCY: Primary | ICD-10-CM

## 2020-11-04 PROCEDURE — 96372 THER/PROPH/DIAG INJ SC/IM: CPT | Performed by: FAMILY MEDICINE

## 2020-11-04 RX ORDER — CYANOCOBALAMIN 1000 UG/ML
1000 INJECTION INTRAMUSCULAR; SUBCUTANEOUS ONCE
Status: COMPLETED | OUTPATIENT
Start: 2020-11-04 | End: 2020-11-04

## 2020-11-04 RX ADMIN — CYANOCOBALAMIN 1000 MCG: 1000 INJECTION INTRAMUSCULAR; SUBCUTANEOUS at 14:07

## 2020-11-05 ENCOUNTER — OFFICE VISIT (OUTPATIENT)
Dept: OBGYN CLINIC | Facility: CLINIC | Age: 65
End: 2020-11-05
Payer: MEDICARE

## 2020-11-05 VITALS
HEIGHT: 60 IN | WEIGHT: 157 LBS | BODY MASS INDEX: 30.82 KG/M2 | DIASTOLIC BLOOD PRESSURE: 68 MMHG | TEMPERATURE: 98.8 F | SYSTOLIC BLOOD PRESSURE: 120 MMHG
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
## 2020-11-05 DIAGNOSIS — S61.451D: Primary | ICD-10-CM

## 2020-11-05 PROCEDURE — 99213 OFFICE O/P EST LOW 20 MIN: CPT | Performed by: ORTHOPAEDIC SURGERY

## 2020-11-10 DIAGNOSIS — E03.9 HYPOTHYROIDISM, UNSPECIFIED TYPE: ICD-10-CM

## 2020-11-10 RX ORDER — LEVOTHYROXINE SODIUM 0.12 MG/1
TABLET ORAL
Qty: 30 TABLET | Refills: 5 | Status: SHIPPED | OUTPATIENT
Start: 2020-11-10 | End: 2021-05-14

## 2020-11-17 DIAGNOSIS — M51.36 DDD (DEGENERATIVE DISC DISEASE), LUMBAR: ICD-10-CM

## 2020-11-18 RX ORDER — HYDROCODONE BITARTRATE AND ACETAMINOPHEN 5; 325 MG/1; MG/1
TABLET ORAL
Qty: 30 TABLET | Refills: 0 | Status: SHIPPED | OUTPATIENT
Start: 2020-11-18 | End: 2020-12-11 | Stop reason: SDUPTHER

## 2020-11-27 ENCOUNTER — APPOINTMENT (OUTPATIENT)
Dept: RADIOLOGY | Facility: CLINIC | Age: 65
End: 2020-11-27
Payer: MEDICARE

## 2020-11-27 ENCOUNTER — OFFICE VISIT (OUTPATIENT)
Dept: URGENT CARE | Facility: CLINIC | Age: 65
End: 2020-11-27
Payer: MEDICARE

## 2020-11-27 VITALS
WEIGHT: 153 LBS | SYSTOLIC BLOOD PRESSURE: 142 MMHG | TEMPERATURE: 98.4 F | OXYGEN SATURATION: 97 % | BODY MASS INDEX: 30.04 KG/M2 | RESPIRATION RATE: 18 BRPM | HEIGHT: 60 IN | DIASTOLIC BLOOD PRESSURE: 82 MMHG | HEART RATE: 58 BPM

## 2020-11-27 DIAGNOSIS — M25.559 HIP PAIN: Primary | ICD-10-CM

## 2020-11-27 DIAGNOSIS — M25.559 HIP PAIN: ICD-10-CM

## 2020-11-27 PROCEDURE — G0463 HOSPITAL OUTPT CLINIC VISIT: HCPCS | Performed by: NURSE PRACTITIONER

## 2020-11-27 PROCEDURE — 99213 OFFICE O/P EST LOW 20 MIN: CPT | Performed by: NURSE PRACTITIONER

## 2020-11-27 PROCEDURE — 73502 X-RAY EXAM HIP UNI 2-3 VIEWS: CPT

## 2020-12-02 ENCOUNTER — OFFICE VISIT (OUTPATIENT)
Dept: OBGYN CLINIC | Facility: CLINIC | Age: 65
End: 2020-12-02
Payer: MEDICARE

## 2020-12-02 VITALS
HEIGHT: 60 IN | SYSTOLIC BLOOD PRESSURE: 120 MMHG | WEIGHT: 153 LBS | BODY MASS INDEX: 30.04 KG/M2 | TEMPERATURE: 98 F | DIASTOLIC BLOOD PRESSURE: 70 MMHG

## 2020-12-02 DIAGNOSIS — S76.011A STRAIN OF MUSCLE OF RIGHT HIP, INITIAL ENCOUNTER: Primary | ICD-10-CM

## 2020-12-02 PROCEDURE — 99213 OFFICE O/P EST LOW 20 MIN: CPT | Performed by: ORTHOPAEDIC SURGERY

## 2020-12-11 DIAGNOSIS — F41.1 GAD (GENERALIZED ANXIETY DISORDER): ICD-10-CM

## 2020-12-11 DIAGNOSIS — M51.36 DDD (DEGENERATIVE DISC DISEASE), LUMBAR: ICD-10-CM

## 2020-12-11 RX ORDER — SERTRALINE HYDROCHLORIDE 100 MG/1
200 TABLET, FILM COATED ORAL DAILY
Qty: 180 TABLET | Refills: 3 | Status: SHIPPED | OUTPATIENT
Start: 2020-12-11 | End: 2022-03-12

## 2020-12-11 RX ORDER — HYDROCODONE BITARTRATE AND ACETAMINOPHEN 5; 325 MG/1; MG/1
TABLET ORAL
Qty: 30 TABLET | Refills: 0 | Status: SHIPPED | OUTPATIENT
Start: 2020-12-11 | End: 2021-01-15 | Stop reason: SDUPTHER

## 2020-12-15 DIAGNOSIS — B34.9 VIRAL INFECTION, UNSPECIFIED: ICD-10-CM

## 2020-12-15 DIAGNOSIS — Z03.818 ENCOUNTER FOR OBSERVATION FOR SUSPECTED EXPOSURE TO OTHER BIOLOGICAL AGENTS RULED OUT: ICD-10-CM

## 2020-12-15 DIAGNOSIS — M51.9 DISC DISORDER OF LUMBAR REGION: ICD-10-CM

## 2020-12-15 PROCEDURE — U0003 INFECTIOUS AGENT DETECTION BY NUCLEIC ACID (DNA OR RNA); SEVERE ACUTE RESPIRATORY SYNDROME CORONAVIRUS 2 (SARS-COV-2) (CORONAVIRUS DISEASE [COVID-19]), AMPLIFIED PROBE TECHNIQUE, MAKING USE OF HIGH THROUGHPUT TECHNOLOGIES AS DESCRIBED BY CMS-2020-01-R: HCPCS | Performed by: FAMILY MEDICINE

## 2020-12-15 RX ORDER — TIZANIDINE 4 MG/1
TABLET ORAL
Qty: 180 TABLET | Refills: 1 | Status: SHIPPED | OUTPATIENT
Start: 2020-12-15 | End: 2021-06-19

## 2020-12-18 ENCOUNTER — TELEPHONE (OUTPATIENT)
Dept: FAMILY MEDICINE CLINIC | Facility: CLINIC | Age: 65
End: 2020-12-18

## 2020-12-18 ENCOUNTER — TELEMEDICINE (OUTPATIENT)
Dept: FAMILY MEDICINE CLINIC | Facility: CLINIC | Age: 65
End: 2020-12-18
Payer: MEDICARE

## 2020-12-18 DIAGNOSIS — U07.1 COVID-19 VIRUS INFECTION: Primary | ICD-10-CM

## 2020-12-18 LAB — SARS-COV-2 RNA SPEC QL NAA+PROBE: DETECTED

## 2020-12-18 PROCEDURE — 99213 OFFICE O/P EST LOW 20 MIN: CPT | Performed by: FAMILY MEDICINE

## 2021-01-06 ENCOUNTER — EVALUATION (OUTPATIENT)
Dept: PHYSICAL THERAPY | Facility: CLINIC | Age: 66
End: 2021-01-06
Payer: MEDICARE

## 2021-01-06 DIAGNOSIS — S76.011D STRAIN OF MUSCLE OF RIGHT HIP, SUBSEQUENT ENCOUNTER: Primary | ICD-10-CM

## 2021-01-06 PROCEDURE — 97161 PT EVAL LOW COMPLEX 20 MIN: CPT | Performed by: PHYSICAL THERAPIST

## 2021-01-06 PROCEDURE — 97140 MANUAL THERAPY 1/> REGIONS: CPT | Performed by: PHYSICAL THERAPIST

## 2021-01-06 NOTE — PROGRESS NOTES
PT Evaluation     Today's date: 2021  Patient name: Eliu Weller  : 1955  MRN: [id number]  Referring provider: Keanu Samuels MD  Dx:   Encounter Diagnosis     ICD-10-CM    1  Strain of muscle of right hip, initial encounter  [phone] Ambulatory referral to Physical Therapy                  Assessment  Assessment details: Pt is a 76 y o  female who presents to outpatient PT with pain, decreased rom, decreased strength and decreased functional mobility  She will benefit from skilled PT to address these deficits in order to achieve her goals and maximize her functional mobility  Goals  Short Term Goals: Independent performance of initial hep  Decrease pain 2 points on VAS      Long Term Goals: Independent performance of comprehensive hep  Performance of IADL's is returned to max level of function  Performance in recreational activities is improved to max level of function  Able to walk community distance with no AD    Plan  Planned therapy interventions: IADL retraining, joint mobilization, manual therapy, massage, balance, strengthening, stretching, therapeutic activities, therapeutic exercise, therapeutic training, transfer training, gait training and home exercise program  Frequency: 2x week  Duration in weeks: 6        Subjective Evaluation    History of Present Illness  Mechanism of injury: Pt reports that in th 2nd week of 2020 she was moving her washer and dryer and started to have sig R hip pain  Reports that that pain is steadily getting worse  Is currently ambulating with a RW but would like to return to walking with no AD  Reports that prior to this injury she would only utilize a [address] for long community walking  Reports that she has pain with bed mobility  Pain reduction when lying in a recliner  History of laminectomy, L hip displasia, 2 falls in the past year  Reports that she has poor balance secondary to L hip symptoms      Pain  Current pain ratin  At best pain rating: 3  At worst pain ratin    Patient Goals  Patient goals for therapy: decreased pain, improved balance, increased motion, increased strength, independence with ADLs/IADLs and return to sport/leisure activities          Objective       R hip:    ROM:    Flex: 105  Abd: 22  IR: 20  ER:  23      Strength:    Flex: 4-/5  Abd: 3+ /5  Ir: 4/5  Er:4- /5  Ext:  4-/5    TTP along adductors and hip flexor  Tight glutes, ITB and hip flexor noted  Scour:neg  YONATHAN: neg  FABIR: neg  Single leg Squat: unable  Pt ambulates with antalgic gait pattern,   Stands with lumbar flexion, increased thoracic kyphosis and forward head           Precautions: CA, fall risk  L hip dysplasia          Manuals             R hip prom kl                                                   Neuro Re-Ed                                                                                                        Ther Ex             Hip er fallout             Gluteal stretch             Ball squeeze             clamshells             bridge             marching             sidestepping             Leg press                                                                              Ther Activity             Recumbent bike                          Gait Training                                       Modalities

## 2021-01-08 ENCOUNTER — OFFICE VISIT (OUTPATIENT)
Dept: PHYSICAL THERAPY | Facility: CLINIC | Age: 66
End: 2021-01-08
Payer: MEDICARE

## 2021-01-08 DIAGNOSIS — S76.011D STRAIN OF MUSCLE OF RIGHT HIP, SUBSEQUENT ENCOUNTER: Primary | ICD-10-CM

## 2021-01-08 PROCEDURE — 97140 MANUAL THERAPY 1/> REGIONS: CPT | Performed by: PHYSICAL THERAPIST

## 2021-01-08 PROCEDURE — 97110 THERAPEUTIC EXERCISES: CPT | Performed by: PHYSICAL THERAPIST

## 2021-01-08 NOTE — PROGRESS NOTES
Daily Note     Today's date: 2021  Patient name: Eugenia Ward  : 1955  MRN: [id number]  Referring provider: Miguelito Brooks MD  Dx:   Encounter Diagnosis     ICD-10-CM    1  Strain of muscle of right hip, subsequent encounter  [id number]                   Subjective: pt reports compliance with her hep and that she is having no difficulty with it  Reports that she is more sore today secondary to "overdoing it" with housework  Objective: See treatment diary below      Assessment: initiated tx as listed with no complaints  Monitor response and progress as elza NV  Plan: Continue per plan of care  Precautions: CA, fall risk  L hip dysplasia          Manuals            R hip prom kl kl                                                  Neuro Re-Ed                                                                                                        Ther Ex             Hip er fallout             Gluteal stretch             Ball squeeze             clamshells             bridge             marching             sidestepping             Leg press                                                                              Ther Activity             Recumbent bike                          Gait Training                                       Modalities

## 2021-01-13 ENCOUNTER — OFFICE VISIT (OUTPATIENT)
Dept: PHYSICAL THERAPY | Facility: CLINIC | Age: 66
End: 2021-01-13
Payer: MEDICARE

## 2021-01-13 DIAGNOSIS — S76.011D STRAIN OF MUSCLE OF RIGHT HIP, SUBSEQUENT ENCOUNTER: Primary | ICD-10-CM

## 2021-01-13 PROCEDURE — 97140 MANUAL THERAPY 1/> REGIONS: CPT

## 2021-01-13 PROCEDURE — 97110 THERAPEUTIC EXERCISES: CPT

## 2021-01-13 NOTE — PROGRESS NOTES
Daily Note     Today's date: 2021  Patient name: Consuelo Rodas  : 1955  MRN: [id number]  Referring provider: Marian Aggarwal MD  Dx:   Encounter Diagnosis     ICD-10-CM    1  Strain of muscle of right hip, subsequent encounter  [id number]                   Subjective: Patient reports soreness in the right hip is increased after taking out her trash and recycling cans  Objective: See treatment diary below  Assessment: Right lower extremity fatigue noted when performing marching in supine  Decreased stiffness noted in the right hip with the manual stretching  Plan: Continue treatment as per PT plan of care  Precautions: CA, fall risk   L hip dysplasia      Manuals           R hip prom kl kl [id number]                                                 Neuro Re-Ed                                                                                                        Ther Ex             Hip er fallout   5"x10          Hamstring stretch   manual 30"x3          Gluteal stretch   manual 30"x3          Ball squeeze   5"x10          clamshells   green  5"x10          bridge   5"x10          marching   10          sidestepping             Leg press   30#  30                                                                           Ther Activity             Recumbent bike                          Gait Training                                       Modalities

## 2021-01-15 ENCOUNTER — APPOINTMENT (OUTPATIENT)
Dept: PHYSICAL THERAPY | Facility: CLINIC | Age: 66
End: 2021-01-15
Payer: MEDICARE

## 2021-01-15 DIAGNOSIS — M51.36 DDD (DEGENERATIVE DISC DISEASE), LUMBAR: ICD-10-CM

## 2021-01-15 DIAGNOSIS — F41.1 GAD (GENERALIZED ANXIETY DISORDER): ICD-10-CM

## 2021-01-15 RX ORDER — CLONAZEPAM 1 MG/1
TABLET ORAL
Qty: 30 TABLET | Refills: 0 | Status: SHIPPED | OUTPATIENT
Start: 2021-01-15 | End: 2021-03-23 | Stop reason: SDUPTHER

## 2021-01-15 RX ORDER — HYDROCODONE BITARTRATE AND ACETAMINOPHEN 5; 325 MG/1; MG/1
TABLET ORAL
Qty: 30 TABLET | Refills: 0 | Status: SHIPPED | OUTPATIENT
Start: 2021-01-15 | End: 2021-02-16 | Stop reason: SDUPTHER

## 2021-01-22 ENCOUNTER — CLINICAL SUPPORT (OUTPATIENT)
Dept: FAMILY MEDICINE CLINIC | Facility: CLINIC | Age: 66
End: 2021-01-22
Payer: MEDICARE

## 2021-01-22 VITALS — TEMPERATURE: 97.6 F

## 2021-01-22 DIAGNOSIS — E53.8 VITAMIN B 12 DEFICIENCY: Primary | ICD-10-CM

## 2021-01-22 PROCEDURE — 96372 THER/PROPH/DIAG INJ SC/IM: CPT

## 2021-01-25 RX ORDER — CYANOCOBALAMIN 1000 UG/ML
1000 INJECTION INTRAMUSCULAR; SUBCUTANEOUS ONCE
Status: COMPLETED | OUTPATIENT
Start: 2021-01-25 | End: 2021-01-25

## 2021-01-25 RX ADMIN — CYANOCOBALAMIN 1000 MCG: 1000 INJECTION INTRAMUSCULAR; SUBCUTANEOUS at 15:52

## 2021-01-26 ENCOUNTER — TELEPHONE (OUTPATIENT)
Dept: FAMILY MEDICINE CLINIC | Facility: CLINIC | Age: 66
End: 2021-01-26

## 2021-01-28 ENCOUNTER — TELEMEDICINE (OUTPATIENT)
Dept: FAMILY MEDICINE CLINIC | Facility: CLINIC | Age: 66
End: 2021-01-28
Payer: MEDICARE

## 2021-01-28 DIAGNOSIS — R10.31 GROIN PAIN, RIGHT: Primary | ICD-10-CM

## 2021-01-28 DIAGNOSIS — S76.011D STRAIN OF MUSCLE OF RIGHT HIP, SUBSEQUENT ENCOUNTER: ICD-10-CM

## 2021-01-28 DIAGNOSIS — M51.9 DISC DISORDER OF LUMBAR REGION: ICD-10-CM

## 2021-01-28 PROCEDURE — 99213 OFFICE O/P EST LOW 20 MIN: CPT | Performed by: FAMILY MEDICINE

## 2021-01-28 NOTE — PROGRESS NOTES
Virtual Regular Visit      Assessment/Plan:    Problem List Items Addressed This Visit        Musculoskeletal and Integument    Disc disorder of lumbar region    Relevant Orders    Ambulatory referral to Pain Management    Strain of muscle of right hip    Relevant Orders    Ambulatory referral to Pain Management      Other Visit Diagnoses     Groin pain, right    -  Primary    Relevant Orders    Ambulatory referral to Pain Management      Patient presents for evaluation of right groin pain since November 2020  Her symptoms started after fall and strain as patient was moving heavy washer and dryer at home  Patient reports deep groin pain worsened by certain range of motion  Previous evaluation included consultations with [facility] Orthopedic surgery, chiropractor treatments and physical therapy  X-ray of right hip was unremarkable  Patient suffers from chronic lumbar sacral disc disease and severe osteoarthritis  She has been using walker for comfort within past few months  Patient admits significant daily pain which is partially alleviated by ibuprofen  Patient is reluctant to consider MRI since she has spinal cord stimulator in place and she is not sure if it is MRI compatible  I advised patient to schedule evaluation with [facility]  She may benefit from MRI of hip and groin but I will defer to my colleagues after they can examined patient in person  If MRI is not feasible -then patient will likely benefit from CT arthrogram     Patient is agreeable with this plan               Reason for visit is   Chief Complaint   Patient presents with    Virtual Regular Visit        Encounter provider Priya Mitchell MD    Provider located at [address] [place], [place] [address]      Recent Visits  Date Type Provider Dept   01/28/21 Telemedicine Priya Mitchell MD [facility] recent visits within past 7 days and meeting all other requirements     Future Appointments  No visits were found meeting these conditions  Showing future appointments within next 150 days and meeting all other requirements        The patient was identified by name and date of birth  Santo Cain was informed that this is a telemedicine visit and that the visit is being conducted through [facility] and patient was informed that this is a secure, HIPAA-compliant platform  She agrees to proceed     My office door was closed  No one else was in the room  She acknowledged consent and understanding of privacy and security of the video platform  The patient has agreed to participate and understands they can discontinue the visit at any time  Patient is aware this is a billable service  Subjective  Santo Cain is a 76 y o  female   Who presents for evaluation of persistent right groin pain   Patient presents for evaluation of ongoing right groin pain since early November of 2020  Patient states that she fell earlier that months  Subsequently she has pushed heavy washer and dryer and developed pain in her right groin  Patient denies symptoms of acute hip or back pain  She does suffer from chronic polyarticular arthralgias /DJD and chronic back pain / known multilevel spine disease  This new pain originates deep in the groin and then moves up to the groin and then radiates to the hip and back  Patient states that groin pain worsens with certain range of motion of her right leg  Patient has been using a walker, she is limping quite a bit  Groin hurts with laying down, patient sleeps on the back with a pillow underneath her right leg  Patient has tried ibuprofen 400-600 mg with some partial but incomplete improvement of symptoms  Patient was evaluated by [facility] on December 2nd, she was diagnosed with  groin strain      Patient has been following up with a chiropractor and completed 3 physical therapy treatments without significant improvement  Patient denies symptoms of right lower extremity sciatica  Patient has spinal cord stimulator in place  She is not sure if  It is MRI compatible  Xray   Right hip/pelvis 11/2020:  Mild to moderate bilateral hip degenerative changes  No acute fracture or dislocation       Past Medical History:   Diagnosis Date    Anemia     Iron and B-12 anemia    Anxiety and depression     Bleeding ulcer     Disease of thyroid gland     GERD (gastroesophageal reflux disease)     Scoliosis     Spinal meningioma (HCC)        Past Surgical History:   Procedure Laterality Date    SPINAL CORD STIMULATOR IMPLANT      SPINE SURGERY         Current Outpatient Medications   Medication Sig Dispense Refill    amLODIPine (NORVASC) 5 mg tablet TAKE 1 TABLET BY MOUTH EVERY DAY 90 tablet 1    atorvastatin (LIPITOR) 40 mg tablet TAKE 1 TABLET BY MOUTH 4 DAYS PER WEEK 54 tablet 1    Calcium Carbonate-Vitamin D (CALCIUM CREAMIES PO) Take 600 mg by mouth daily      Cholecalciferol ([employer]S VITAMIN D3) 1000 units capsule Take 2,000 Units by mouth      clonazePAM (KlonoPIN) 1 mg tablet Take half a tablet (0 5 mg) twice a day as needed for anxiety 30 tablet 0    Cyanocobalamin 1000 MCG/ML LIQD cyanocobalamin (vit B-12) 1,000 mcg/mL injection solution      ferrous sulfate 325 (65 Fe) mg tablet Take by oral route        fluticasone (FLONASE) 50 mcg/act nasal spray USE 2 SPRAYS IN EACH NOSTRIL EVERY DAY 3 Bottle 3    HYDROcodone-acetaminophen (NORCO) 5-325 mg per tablet Take 1 tablet 2 times a day as needed for severe low back pain 30 tablet 0    levothyroxine 125 mcg tablet TAKE 1 TABLET BY MOUTH EVERY DAY 30 tablet 5    lidocaine (LIDODERM) 5 % Apply 1-2 patches daily as needed, remove in 12 hours 60 patch 5    metaxalone (SKELAXIN) 800 mg tablet TAKE 1 TABLET BY MOUTH TWICE A DAY AS NEEDED FOR MUSCLE SPASMS 60 tablet 4    naloxone (NARCAN) 4 mg/0 1 mL nasal spray Administer 1 spray into a nostril  If breathing does not return to normal or if breathing difficulty resumes after 2-3 minutes, give another dose in the other nostril using a new spray   1 each 1    nebivolol (BYSTOLIC) 10 mg tablet Take 20 mg by mouth daily      omega-3-acid ethyl esters (LOVAZA) 1 g capsule Take 2 g by mouth daily      pantoprazole (PROTONIX) 40 mg tablet TAKE 1 TABLET BY MOUTH DAILY AS NEEDED 30 tablet 6    sertraline (ZOLOFT) 100 mg tablet Take 2 tablets (200 mg total) by mouth daily 180 tablet 3    tiZANidine (ZANAFLEX) 4 mg tablet TAKE 2 TABLETS BY MOUTH AT BEDTIME AS NEEDED FOR MUSCLE SPASMS 180 tablet 1     Current Facility-Administered Medications   Medication Dose Route Frequency Provider Last Rate Last Admin    cyanocobalamin injection 1,000 mcg  1,000 mcg Intramuscular Q30 Days Radha Choi MD   1,000 mcg at 02/21/18 1534    cyanocobalamin injection 1,000 mcg  1,000 mcg Intramuscular Q30 Days Radha Choi MD   1,000 mcg at 03/29/18 0717    cyanocobalamin injection 1,000 mcg  1,000 mcg Intramuscular Q30 Days Radha Choi MD   1,000 mcg at 04/30/18 1737    cyanocobalamin injection 1,000 mcg  1,000 mcg Intramuscular R91 Days Pamila Mcburney, MD   8,992 mcg at 08/10/18 1017    cyanocobalamin injection 1,000 mcg  1,000 mcg Intramuscular Q30 Days Radha Choi MD   1,000 mcg at 09/10/18 1127    cyanocobalamin injection 1,000 mcg  1,000 mcg Intramuscular Q30 Days Radha Choi MD   1,000 mcg at 02/11/19 0958    cyanocobalamin injection 1,000 mcg  1,000 mcg Intramuscular Q30 Days Radha Choi MD   1,000 mcg at 09/23/19 1739    cyanocobalamin injection 1,000 mcg  1,000 mcg Intramuscular Q30 Days Radha Choi MD   1,000 mcg at 10/29/19 2010    cyanocobalamin injection 1,000 mcg  1,000 mcg Intramuscular Q30 Days Jonh Felipe MD   1,000 mcg at 12/06/19 1513    cyanocobalamin injection 1,000 mcg  1,000 mcg Intramuscular Q30 Days Radha Choi MD   1,000 mcg at 07/02/20 1412        Allergies   Allergen Reactions    Cephalosporins Other (See Comments)     GI Upset (vomit/diarrhea)    Medical Tape Rash    Metformin      Other reaction(s): Nausea and/or vomiting    Nabumetone GI Intolerance    Other GI Intolerance    Anesthesia S-I-40 [Propofol]     Celecoxib     Cephalexin     Choline Magnesium Trisalicylate     Diclofenac     Diclofenac Potassium(Migraine) GI Intolerance    Diclofenac Sodium GI Intolerance    Fentanyl     Propoxyphene Other (See Comments)     unknown    Sulfa Antibiotics     Sulfacetamide     Zolpidem        Review of Systems   Constitutional: Negative  HENT: Negative  Eyes: Negative  Respiratory: Negative  Cardiovascular: Negative  Musculoskeletal: Positive for arthralgias, back pain and gait problem  Video Exam    There were no vitals filed for this visit  Physical Exam  Constitutional:       Appearance: Normal appearance  Pulmonary:      Effort: Pulmonary effort is normal  No respiratory distress  Neurological:      General: No focal deficit present  Mental Status: She is alert and oriented to person, place, and time  Psychiatric:         Mood and Affect: Mood normal          Behavior: Behavior normal           I spent 10 minutes directly with the patient during this visit      VIRTUAL VISIT DISCLAIMER    Santo Cain acknowledges that she has consented to an online visit or consultation  She understands that the online visit is based solely on information provided by her, and that, in the absence of a face-to-face physical evaluation by the physician, the diagnosis she receives is both limited and provisional in terms of accuracy and completeness  This is not intended to replace a full medical face-to-face evaluation by the physician  Santo Cain understands and accepts these terms

## 2021-01-29 ENCOUNTER — APPOINTMENT (OUTPATIENT)
Dept: PHYSICAL THERAPY | Facility: CLINIC | Age: 66
End: 2021-01-29
Payer: MEDICARE

## 2021-02-03 PROBLEM — R10.31 GROIN PAIN, RIGHT: Status: ACTIVE | Noted: 2021-02-03

## 2021-02-16 DIAGNOSIS — I10 BENIGN ESSENTIAL HYPERTENSION: ICD-10-CM

## 2021-02-16 DIAGNOSIS — M51.36 DDD (DEGENERATIVE DISC DISEASE), LUMBAR: ICD-10-CM

## 2021-02-16 RX ORDER — HYDROCODONE BITARTRATE AND ACETAMINOPHEN 5; 325 MG/1; MG/1
TABLET ORAL
Qty: 30 TABLET | Refills: 0 | Status: SHIPPED | OUTPATIENT
Start: 2021-02-16 | End: 2021-03-23 | Stop reason: SDUPTHER

## 2021-02-16 RX ORDER — AMLODIPINE BESYLATE 5 MG/1
5 TABLET ORAL DAILY
Qty: 90 TABLET | Refills: 1 | Status: SHIPPED | OUTPATIENT
Start: 2021-02-16 | End: 2021-08-10

## 2021-02-24 ENCOUNTER — CLINICAL SUPPORT (OUTPATIENT)
Dept: FAMILY MEDICINE CLINIC | Facility: CLINIC | Age: 66
End: 2021-02-24
Payer: MEDICARE

## 2021-02-24 VITALS — TEMPERATURE: 98 F

## 2021-02-24 DIAGNOSIS — E53.8 VITAMIN B 12 DEFICIENCY: Primary | ICD-10-CM

## 2021-02-24 PROCEDURE — 96372 THER/PROPH/DIAG INJ SC/IM: CPT

## 2021-03-01 ENCOUNTER — TELEPHONE (OUTPATIENT)
Dept: PAIN MEDICINE | Facility: CLINIC | Age: 66
End: 2021-03-01

## 2021-03-03 RX ORDER — CYANOCOBALAMIN 1000 UG/ML
1000 INJECTION INTRAMUSCULAR; SUBCUTANEOUS ONCE
Status: COMPLETED | OUTPATIENT
Start: 2021-03-03 | End: 2021-03-04

## 2021-03-04 RX ADMIN — CYANOCOBALAMIN 1000 MCG: 1000 INJECTION INTRAMUSCULAR; SUBCUTANEOUS at 09:47

## 2021-03-23 DIAGNOSIS — M51.36 DDD (DEGENERATIVE DISC DISEASE), LUMBAR: ICD-10-CM

## 2021-03-23 DIAGNOSIS — F41.1 GAD (GENERALIZED ANXIETY DISORDER): ICD-10-CM

## 2021-03-23 RX ORDER — HYDROCODONE BITARTRATE AND ACETAMINOPHEN 5; 325 MG/1; MG/1
TABLET ORAL
Qty: 30 TABLET | Refills: 0 | Status: SHIPPED | OUTPATIENT
Start: 2021-03-23 | End: 2021-04-27 | Stop reason: SDUPTHER

## 2021-03-23 RX ORDER — CLONAZEPAM 1 MG/1
TABLET ORAL
Qty: 30 TABLET | Refills: 3 | Status: SHIPPED | OUTPATIENT
Start: 2021-03-23 | End: 2021-06-30 | Stop reason: SDUPTHER

## 2021-03-31 ENCOUNTER — CLINICAL SUPPORT (OUTPATIENT)
Dept: FAMILY MEDICINE CLINIC | Facility: CLINIC | Age: 66
End: 2021-03-31
Payer: MEDICARE

## 2021-03-31 VITALS — TEMPERATURE: 98 F

## 2021-03-31 DIAGNOSIS — E53.8 VITAMIN B 12 DEFICIENCY: Primary | ICD-10-CM

## 2021-03-31 PROCEDURE — 96372 THER/PROPH/DIAG INJ SC/IM: CPT

## 2021-03-31 RX ORDER — CYANOCOBALAMIN 1000 UG/ML
1000 INJECTION INTRAMUSCULAR; SUBCUTANEOUS ONCE
Status: COMPLETED | OUTPATIENT
Start: 2021-03-31 | End: 2021-03-31

## 2021-03-31 RX ADMIN — CYANOCOBALAMIN 1000 MCG: 1000 INJECTION INTRAMUSCULAR; SUBCUTANEOUS at 15:23

## 2021-04-04 DIAGNOSIS — E78.2 MIXED HYPERLIPIDEMIA: ICD-10-CM

## 2021-04-04 RX ORDER — ATORVASTATIN CALCIUM 40 MG/1
TABLET, FILM COATED ORAL
Qty: 54 TABLET | Refills: 1 | Status: SHIPPED | OUTPATIENT
Start: 2021-04-04 | End: 2021-09-20

## 2021-04-27 DIAGNOSIS — M51.36 DDD (DEGENERATIVE DISC DISEASE), LUMBAR: ICD-10-CM

## 2021-04-28 RX ORDER — HYDROCODONE BITARTRATE AND ACETAMINOPHEN 5; 325 MG/1; MG/1
TABLET ORAL
Qty: 30 TABLET | Refills: 0 | Status: SHIPPED | OUTPATIENT
Start: 2021-04-28 | End: 2021-05-28 | Stop reason: SDUPTHER

## 2021-04-30 ENCOUNTER — CLINICAL SUPPORT (OUTPATIENT)
Dept: FAMILY MEDICINE CLINIC | Facility: CLINIC | Age: 66
End: 2021-04-30
Payer: MEDICARE

## 2021-04-30 VITALS — TEMPERATURE: 97.6 F

## 2021-04-30 DIAGNOSIS — I47.1 PSVT (PAROXYSMAL SUPRAVENTRICULAR TACHYCARDIA) (HCC): ICD-10-CM

## 2021-04-30 DIAGNOSIS — I10 BENIGN ESSENTIAL HYPERTENSION: Primary | ICD-10-CM

## 2021-04-30 DIAGNOSIS — E53.8 VITAMIN B 12 DEFICIENCY: Primary | ICD-10-CM

## 2021-04-30 PROCEDURE — 96372 THER/PROPH/DIAG INJ SC/IM: CPT

## 2021-04-30 RX ORDER — CYANOCOBALAMIN 1000 UG/ML
1000 INJECTION INTRAMUSCULAR; SUBCUTANEOUS ONCE
Status: COMPLETED | OUTPATIENT
Start: 2021-04-30 | End: 2021-04-30

## 2021-04-30 RX ADMIN — CYANOCOBALAMIN 1000 MCG: 1000 INJECTION INTRAMUSCULAR; SUBCUTANEOUS at 14:34

## 2021-05-03 RX ORDER — NEBIVOLOL 10 MG/1
20 TABLET ORAL DAILY
Qty: 30 TABLET | Refills: 2 | OUTPATIENT
Start: 2021-05-03

## 2021-05-03 NOTE — TELEPHONE ENCOUNTER
Spoke with pt  Pt states she is taking Bystolic 10 mg one PO QD  Please send in for a 90 day supply to [facility] in [place]

## 2021-05-03 NOTE — TELEPHONE ENCOUNTER
Please contact patient  I received refill request for her Bystolic  Usually her cardiologist refills this medication  Patient is requesting 10 mg tablets by taking 2 per day for the total dose of 20 mg daily  Why can't she take 1 tablet of 20 mg once a day? Please clarify prior to the refill    Thank you

## 2021-05-04 RX ORDER — NEBIVOLOL 10 MG/1
10 TABLET ORAL DAILY
Qty: 90 TABLET | Refills: 3 | Status: SHIPPED | OUTPATIENT
Start: 2021-05-04 | End: 2022-05-01

## 2021-05-14 ENCOUNTER — OFFICE VISIT (OUTPATIENT)
Dept: FAMILY MEDICINE CLINIC | Facility: CLINIC | Age: 66
End: 2021-05-14
Payer: MEDICARE

## 2021-05-14 VITALS
WEIGHT: 155 LBS | TEMPERATURE: 97.6 F | HEIGHT: 60 IN | BODY MASS INDEX: 30.43 KG/M2 | OXYGEN SATURATION: 99 % | HEART RATE: 69 BPM | DIASTOLIC BLOOD PRESSURE: 80 MMHG | RESPIRATION RATE: 17 BRPM | SYSTOLIC BLOOD PRESSURE: 138 MMHG

## 2021-05-14 DIAGNOSIS — R10.12 LUQ PAIN: Primary | ICD-10-CM

## 2021-05-14 DIAGNOSIS — M25.552 CHRONIC LEFT HIP PAIN: ICD-10-CM

## 2021-05-14 DIAGNOSIS — E53.8 VITAMIN B 12 DEFICIENCY: ICD-10-CM

## 2021-05-14 DIAGNOSIS — G47.33 OSA (OBSTRUCTIVE SLEEP APNEA): ICD-10-CM

## 2021-05-14 DIAGNOSIS — G89.29 CHRONIC LEFT HIP PAIN: ICD-10-CM

## 2021-05-14 DIAGNOSIS — K58.0 IRRITABLE BOWEL SYNDROME WITH DIARRHEA: ICD-10-CM

## 2021-05-14 DIAGNOSIS — I10 BENIGN ESSENTIAL HYPERTENSION: ICD-10-CM

## 2021-05-14 DIAGNOSIS — M25.512 CHRONIC LEFT SHOULDER PAIN: ICD-10-CM

## 2021-05-14 DIAGNOSIS — E03.9 HYPOTHYROIDISM, UNSPECIFIED TYPE: ICD-10-CM

## 2021-05-14 DIAGNOSIS — G89.29 CHRONIC LEFT SHOULDER PAIN: ICD-10-CM

## 2021-05-14 DIAGNOSIS — I47.1 PSVT (PAROXYSMAL SUPRAVENTRICULAR TACHYCARDIA) (HCC): ICD-10-CM

## 2021-05-14 DIAGNOSIS — E78.2 MIXED HYPERLIPIDEMIA: ICD-10-CM

## 2021-05-14 PROBLEM — S61.451A: Status: RESOLVED | Noted: 2020-10-29 | Resolved: 2021-05-14
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
## 2021-05-14 PROBLEM — Z86.16 PERSONAL HISTORY OF COVID-19: Status: ACTIVE | Noted: 2020-12-18

## 2021-05-14 PROCEDURE — 99214 OFFICE O/P EST MOD 30 MIN: CPT | Performed by: FAMILY MEDICINE

## 2021-05-14 RX ORDER — DICYCLOMINE HCL 20 MG
20 TABLET ORAL 2 TIMES DAILY PRN
Qty: 30 TABLET | Refills: 1 | Status: SHIPPED | OUTPATIENT
Start: 2021-05-14 | End: 2021-06-11

## 2021-05-14 RX ORDER — LIDOCAINE 50 MG/G
PATCH TOPICAL
Qty: 60 PATCH | Refills: 3 | Status: SHIPPED | OUTPATIENT
Start: 2021-05-14 | End: 2022-03-31

## 2021-05-14 RX ORDER — LEVOTHYROXINE SODIUM 0.12 MG/1
TABLET ORAL
Qty: 90 TABLET | Refills: 1 | Status: SHIPPED | OUTPATIENT
Start: 2021-05-14 | End: 2021-11-09

## 2021-05-14 NOTE — ASSESSMENT & PLAN NOTE
No left upper quadrant tenderness on exam   I suspect IBS is likely etiology   Trial of Bentyl p r n     Follow-up with Gastroenterology, Dr Melanie Muniz  Patient is up-to-date with colonoscopy

## 2021-05-14 NOTE — PROGRESS NOTES
Assessment/Plan:    1  LUQ pain  Assessment & Plan:  No left upper quadrant tenderness on exam   I suspect IBS is likely etiology   Trial of Bentyl p r n     Follow-up with Gastroenterology, Dr Maciej Whyte  Patient is up-to-date with colonoscopy    Orders:  -     Comprehensive metabolic panel; Future  -     CBC and differential; Future  -     Lipase; Future    2  Irritable bowel syndrome with diarrhea  -     dicyclomine (BENTYL) 20 mg tablet; Take 1 tablet (20 mg total) by mouth 2 (two) times a day as needed (Abdominal bloating/pain)    3  PSVT (paroxysmal supraventricular tachycardia) ([address] )    4  Chronic left shoulder pain  -     lidocaine (LIDODERM) 5 %; Apply 1 or 2 patches to affected areas, remove in 12 hours    5  CHAZ (obstructive sleep apnea)  -     Ambulatory referral to Sleep Medicine; Future    6  Hypothyroidism, unspecified type  -     TSH, 3rd generation; Future    7  Mixed hyperlipidemia  Assessment & Plan:   Patient takes atorvastatin 40 mg 4 days per week    Orders:  -     Lipid panel; Future    8  Vitamin B 12 deficiency  -     Vitamin B12; Future    9  Chronic left hip pain  -     XR hip/pelv 4+ vw left if performed; Future; Expected date: 05/14/2021  -     Ambulatory referral to Orthopedic Surgery; Future    10  Benign essential hypertension  Assessment & Plan:   Blood pressure is well controlled on regimen of Bystolic and amlodipine  Pending follow-up with Cardiology next week due to history of PSVT, patient is asymptomatic      BMI Counseling: Body mass index is 30 27 kg/m²  The BMI is above normal  Nutrition recommendations include encouraging healthy choices of fruits and vegetables, consuming healthier snacks, moderation in carbohydrate intake and reducing intake of cholesterol  Exercise recommendations include exercising 3-5 times per week  No pharmacotherapy was ordered  Patient presents for follow-up  Assessment and plan as outlined above   Will plan Pneumovax vaccination at next office visit in 6 months  Patient will proceed with re-evaluation by [facility], she has not been using her CPAP equipment lately and needs up-to-date sleep studies  There are no Patient Instructions on file for this visit  Return in about 6 months (around 11/14/2021) for Follow-up, Medicare wellness  Subjective:      Patient ID: Dennise Soliman is a 78 y o  female  Chief Complaint   Patient presents with    Follow-up       Patient presents for follow-up  She is [facility]  Medications updated  Patient is due for  blood work and will proceed  She reports intermittent symptoms of dull left upper quadrant abdominal pain with no radiation for at least 6 years  Symptoms are reoccurring, on and off  Patient describes her pain as cramping that leads to diarrhea  She denies symptoms of nausea or vomiting  No melena, no bright red blood per rectum  Appetite is normal   Patient has prescription Protonix on hand but takes medication only few times per month  She is on over-the-counter ,lower dose PPI, daily with overall good control of reflux symptoms  Diarrhea usually is postprandial   Patient reports frequent episodes of diarrhea when she is going out to eat  Patient has been following multiple dietary restrictions and overall follows healthy bland diet  Patient is up-to-date with colonoscopy that was performed few years ago by Dr Rylie Lopez  Chronic left shoulder pain - sees chiropractor       Chronic urinary  Incontinence - wears pads      Chronic left hip pain, patient reports multiple episodes of hip instability and is concerned about increased chance of dislocation  Pending mammogram May 25th  Patient remains on daily medications for hypertension hyperlipidemia and chronic myofascial pain syndrome  She denies symptoms of chest pain, palpitations, shortness of breath or dizziness    Patient has completed COVID-19 vaccination  The following portions of the patient's history were reviewed and updated as appropriate: allergies, current medications, past family history, past medical history, past social history, past surgical history and problem list     Review of Systems   Constitutional: Negative  HENT: Negative  Eyes: Negative  Respiratory: Negative  Gastrointestinal: Positive for abdominal pain and diarrhea  Endocrine: Negative  Genitourinary: Negative  Musculoskeletal: Positive for arthralgias  Skin: Negative  Allergic/Immunologic: Negative  Neurological: Negative  Hematological: Negative  Current Outpatient Medications   Medication Sig Dispense Refill    amLODIPine (NORVASC) 5 mg tablet Take 1 tablet (5 mg total) by mouth daily 90 tablet 1    atorvastatin (LIPITOR) 40 mg tablet TAKE 1 TABLET BY MOUTH 4 DAYS PER WEEK 54 tablet 1    Calcium Carbonate-Vitamin D (CALCIUM CREAMIES PO) Take 600 mg by mouth daily      Cholecalciferol (CVS VITAMIN D3) 1000 units capsule Take 2,000 Units by mouth      clonazePAM (KlonoPIN) 1 mg tablet Take half a tablet (0 5 mg) twice a day as needed for anxiety 30 tablet 3    Cyanocobalamin 1000 MCG/ML LIQD cyanocobalamin (vit B-12) 1,000 mcg/mL injection solution      ferrous sulfate 325 (65 Fe) mg tablet Take by oral route        HYDROcodone-acetaminophen (NORCO) 5-325 mg per tablet Take 1 tablet 2 times a day as needed for severe low back pain 30 tablet 0    metaxalone (SKELAXIN) 800 mg tablet TAKE 1 TABLET BY MOUTH TWICE A DAY AS NEEDED FOR MUSCLE SPASMS 60 tablet 4    nebivolol (Bystolic) 10 mg tablet Take 1 tablet (10 mg total) by mouth daily 90 tablet 3    pantoprazole (PROTONIX) 40 mg tablet TAKE 1 TABLET BY MOUTH DAILY AS NEEDED 30 tablet 6    sertraline (ZOLOFT) 100 mg tablet Take 2 tablets (200 mg total) by mouth daily 180 tablet 3    tiZANidine (ZANAFLEX) 4 mg tablet TAKE 2 TABLETS BY MOUTH AT BEDTIME AS NEEDED FOR MUSCLE SPASMS 180 tablet 1    dicyclomine (BENTYL) 20 mg tablet Take 1 tablet (20 mg total) by mouth 2 (two) times a day as needed (Abdominal bloating/pain) 30 tablet 1    fluticasone (FLONASE) 50 mcg/act nasal spray USE 2 SPRAYS IN EACH NOSTRIL EVERY DAY (Patient not taking: Reported on 5/14/2021) 3 Bottle 3    levothyroxine 125 mcg tablet TAKE 1 TABLET BY MOUTH EVERY DAY 90 tablet 1    lidocaine (LIDODERM) 5 % Apply 1 or 2 patches to affected areas, remove in 12 hours 60 patch 3    naloxone (NARCAN) 4 mg/0 1 mL nasal spray Administer 1 spray into a nostril  If breathing does not return to normal or if breathing difficulty resumes after 2-3 minutes, give another dose in the other nostril using a new spray   (Patient not taking: Reported on 5/14/2021) 1 each 1    omega-3-acid ethyl esters (LOVAZA) 1 g capsule Take 2 g by mouth daily       Current Facility-Administered Medications   Medication Dose Route Frequency Provider Last Rate Last Admin    cyanocobalamin injection 1,000 mcg  1,000 mcg Intramuscular Q30 Days Lissette Lee MD   1,000 mcg at 02/21/18 1534    cyanocobalamin injection 1,000 mcg  1,000 mcg Intramuscular Q30 Days Lissette Lee MD   1,000 mcg at 03/29/18 0717    cyanocobalamin injection 1,000 mcg  1,000 mcg Intramuscular Q30 Days Lissette Lee MD   1,000 mcg at 04/30/18 1737    cyanocobalamin injection 1,000 mcg  1,000 mcg Intramuscular Y95 Days Daniel Grady MD   1,022 mcg at 08/10/18 1017    cyanocobalamin injection 1,000 mcg  1,000 mcg Intramuscular Q30 Days Lissette Lee MD   1,000 mcg at 09/10/18 1127    cyanocobalamin injection 1,000 mcg  1,000 mcg Intramuscular Q30 Days Lissette Lee MD   1,000 mcg at 02/11/19 0958    cyanocobalamin injection 1,000 mcg  1,000 mcg Intramuscular Q30 Days Lissette Lee MD   1,000 mcg at 09/23/19 1739    cyanocobalamin injection 1,000 mcg  1,000 mcg Intramuscular Q30 Days Lissette Lee MD   1,000 mcg at 10/29/19 2010    cyanocobalamin injection 1,000 mcg  1,000 mcg Intramuscular Q30 Days Annamarie Frank MD   1,000 mcg at 12/06/19 1513    cyanocobalamin injection 1,000 mcg  1,000 mcg Intramuscular Q30 Days Serenity Middleton MD   1,000 mcg at 07/02/20 1412       Objective:    /80   Pulse 69   Temp 97 6 °F (36 4 °C) (Temporal)   Resp 17   Ht 5' (1 524 m)   Wt 70 3 kg (155 lb)   SpO2 99%   BMI 30 27 kg/m²        Physical Exam  Vitals signs and nursing note reviewed  Constitutional:       General: She is not in acute distress  Appearance: Normal appearance  She is well-developed  HENT:      Head: Normocephalic and atraumatic  Eyes:      Conjunctiva/sclera: Conjunctivae normal    Neck:      Musculoskeletal: Neck supple  Thyroid: No thyromegaly  Vascular: No carotid bruit  Cardiovascular:      Rate and Rhythm: Normal rate and regular rhythm  Heart sounds: Normal heart sounds  No murmur  Pulmonary:      Effort: Pulmonary effort is normal  No respiratory distress  Breath sounds: Normal breath sounds  No wheezing  Abdominal:      General: Bowel sounds are increased  There is no distension or abdominal bruit  Palpations: Abdomen is soft  Tenderness: There is no abdominal tenderness  There is no guarding  Musculoskeletal:      Right lower leg: No edema  Left lower leg: No edema  Skin:     General: Skin is warm  Findings: No rash  Neurological:      General: No focal deficit present  Mental Status: She is alert and oriented to person, place, and time  Cranial Nerves: No cranial nerve deficit  Coordination: Coordination normal    Psychiatric:         Mood and Affect: Mood normal          Behavior: Behavior normal          Thought Content:  Thought content normal                 Serenity Middleton MD

## 2021-05-18 PROBLEM — E53.8 VITAMIN B 12 DEFICIENCY: Chronic | Status: ACTIVE | Noted: 2020-07-19

## 2021-05-18 PROBLEM — S76.011A STRAIN OF MUSCLE OF RIGHT HIP: Status: RESOLVED | Noted: 2020-12-02 | Resolved: 2021-05-18

## 2021-05-18 PROBLEM — R10.31 GROIN PAIN, RIGHT: Status: RESOLVED | Noted: 2021-02-03 | Resolved: 2021-05-18

## 2021-05-18 NOTE — ASSESSMENT & PLAN NOTE
Blood pressure is well controlled on regimen of Bystolic and amlodipine  Pending follow-up with Cardiology next week due to history of PSVT, patient is asymptomatic

## 2021-05-23 DIAGNOSIS — K58.0 IRRITABLE BOWEL SYNDROME WITH DIARRHEA: ICD-10-CM

## 2021-05-24 RX ORDER — DICYCLOMINE HCL 20 MG
TABLET ORAL
Qty: 30 TABLET | Refills: 1 | OUTPATIENT
Start: 2021-05-24

## 2021-05-25 ENCOUNTER — HOSPITAL ENCOUNTER (OUTPATIENT)
Dept: MAMMOGRAPHY | Facility: CLINIC | Age: 66
Discharge: HOME | End: 2021-05-25
Payer: MEDICARE

## 2021-05-25 VITALS — WEIGHT: 155 LBS | BODY MASS INDEX: 30.43 KG/M2 | HEIGHT: 60 IN

## 2021-05-25 DIAGNOSIS — R92.1 CALCIFICATION OF LEFT BREAST ON MAMMOGRAPHY: ICD-10-CM

## 2021-05-25 PROCEDURE — 77065 DX MAMMO INCL CAD UNI: CPT

## 2021-05-26 ENCOUNTER — TELEPHONE (OUTPATIENT)
Dept: FAMILY MEDICINE CLINIC | Facility: CLINIC | Age: 66
End: 2021-05-26

## 2021-05-26 NOTE — TELEPHONE ENCOUNTER
L/M for pt with results and instructions to schedule 6month f/[facility] mamm[facility]

## 2021-05-26 NOTE — TELEPHONE ENCOUNTER
----- Message from Cristhian Israel MD sent at [id number]/6847  6:37 PM EDT -----  No significant changes on latest mammogram   Radiologist recommends 6 month follow-up mammogram

## 2021-05-28 DIAGNOSIS — M51.36 DDD (DEGENERATIVE DISC DISEASE), LUMBAR: ICD-10-CM

## 2021-05-28 RX ORDER — HYDROCODONE BITARTRATE AND ACETAMINOPHEN 5; 325 MG/1; MG/1
TABLET ORAL
Qty: 30 TABLET | Refills: 0 | Status: SHIPPED | OUTPATIENT
Start: 2021-05-28 | End: 2021-06-30 | Stop reason: SDUPTHER

## 2021-06-01 ENCOUNTER — CLINICAL SUPPORT (OUTPATIENT)
Dept: FAMILY MEDICINE CLINIC | Facility: CLINIC | Age: 66
End: 2021-06-01
Payer: MEDICARE

## 2021-06-01 DIAGNOSIS — E53.8 VITAMIN B 12 DEFICIENCY: Primary | ICD-10-CM

## 2021-06-01 PROCEDURE — 96372 THER/PROPH/DIAG INJ SC/IM: CPT

## 2021-06-01 RX ORDER — CYANOCOBALAMIN 1000 UG/ML
1000 INJECTION INTRAMUSCULAR; SUBCUTANEOUS
Status: SHIPPED | OUTPATIENT
Start: 2021-06-01

## 2021-06-01 RX ADMIN — CYANOCOBALAMIN 1000 MCG: 1000 INJECTION INTRAMUSCULAR; SUBCUTANEOUS at 11:00

## 2021-06-04 ENCOUNTER — OFFICE VISIT (OUTPATIENT)
Dept: OBGYN CLINIC | Facility: CLINIC | Age: 66
End: 2021-06-04
Payer: MEDICARE

## 2021-06-04 ENCOUNTER — APPOINTMENT (OUTPATIENT)
Dept: RADIOLOGY | Facility: CLINIC | Age: 66
End: 2021-06-04
Payer: MEDICARE

## 2021-06-04 VITALS
WEIGHT: 149 LBS | SYSTOLIC BLOOD PRESSURE: 154 MMHG | HEART RATE: 61 BPM | BODY MASS INDEX: 29.25 KG/M2 | DIASTOLIC BLOOD PRESSURE: 84 MMHG | HEIGHT: 60 IN

## 2021-06-04 DIAGNOSIS — M70.62 GREATER TROCHANTERIC BURSITIS OF LEFT HIP: Primary | ICD-10-CM

## 2021-06-04 DIAGNOSIS — M25.552 CHRONIC LEFT HIP PAIN: ICD-10-CM

## 2021-06-04 DIAGNOSIS — G89.29 CHRONIC LEFT HIP PAIN: ICD-10-CM

## 2021-06-04 PROCEDURE — 99213 OFFICE O/P EST LOW 20 MIN: CPT | Performed by: PHYSICIAN ASSISTANT

## 2021-06-04 PROCEDURE — 20610 DRAIN/INJ JOINT/BURSA W/O US: CPT | Performed by: PHYSICIAN ASSISTANT

## 2021-06-04 PROCEDURE — 73502 X-RAY EXAM HIP UNI 2-3 VIEWS: CPT

## 2021-06-04 RX ORDER — METHYLPREDNISOLONE ACETATE 40 MG/ML
1 INJECTION, SUSPENSION INTRA-ARTICULAR; INTRALESIONAL; INTRAMUSCULAR; SOFT TISSUE
Status: COMPLETED | OUTPATIENT
Start: 2021-06-04 | End: 2021-06-04

## 2021-06-04 RX ORDER — LIDOCAINE HYDROCHLORIDE 10 MG/ML
4 INJECTION, SOLUTION INFILTRATION; PERINEURAL
Status: COMPLETED | OUTPATIENT
Start: 2021-06-04 | End: 2021-06-04

## 2021-06-04 RX ADMIN — LIDOCAINE HYDROCHLORIDE 4 ML: 10 INJECTION, SOLUTION INFILTRATION; PERINEURAL at 12:37

## 2021-06-04 RX ADMIN — METHYLPREDNISOLONE ACETATE 1 ML: 40 INJECTION, SUSPENSION INTRA-ARTICULAR; INTRALESIONAL; INTRAMUSCULAR; SOFT TISSUE at 12:37

## 2021-06-04 NOTE — PROGRESS NOTES
Patient Name:  Salvatore Gallo  MRN:  [id number]    Assessment & Plan     Left hip trochanteric bursitis  1  Corticosteroid injection performed today into the left hip greater trochanteric bursa  2  Activities as tolerated with modification to avoid pain  3  Follow-up as needed  Chief Complaint     Left hip pain    History of the Present Illness     Salvatore Gallo is a 78 y o  female seen in consultation by orthopedics requested by Dr Anuja Wade for evaluation of patient's left hip  She notes a chronic history of left hip pain over the past few months  She denies any injury or trauma  Pain is localized to the lateral aspect of the hip  She notes radiation of the pain distally along the lateral thigh to the knee  Pain is worse with direct pressure and lying on the left side  She denies any weakness or instability  No numbness or tingling  No fevers or chills  She takes hydrocodone for chronic back pain  Physical Exam     /84   Pulse 61   Ht 5' (1 524 m)   Wt 67 6 kg (149 lb)   BMI 29 10 kg/m²     Left hip: Skin intact  No gross deformity  Significant tenderness to palpation greater trochanteric bursa  No tenderness to palpation anterior hip  Range of motion includes flexion 120, external rotation 40, internal rotation 30  Negative FADDIR and YONATHAN test   Negative logroll test   Negative straight leg raise and resisted straight leg raise test     Eyes:  Anicteric sclerae  ENT:  Trachea midline  Lungs:  Normal respiratory effort  Cardiovascular:  Capillary refill is less than 2 seconds  Lymphatic:  No palpable lymphadenopathy  Skin:  Intact without erythema  Neurologic:  Sensation grossly intact to light touch  Psychiatric:  Mood and affect are appropriate  Data Review     I have personally reviewed pertinent films in PACS, and my interpretation follows:    X-rays left hip 6/4/21: No acute osseous abnormalities  No significant degenerative changes      Past Medical History: Diagnosis Date    Anemia     Iron and B-12 anemia    Anxiety and depression     Bleeding ulcer     Disease of thyroid gland     GERD (gastroesophageal reflux disease)     Groin pain, right 2/3/2021    Scoliosis     Spinal meningioma (HCC)        Past Surgical History:   Procedure Laterality Date    SPINAL CORD STIMULATOR IMPLANT      SPINE SURGERY         Allergies   Allergen Reactions    Cephalosporins Other (See Comments)     GI Upset (vomit/diarrhea)    Medical Tape Rash    Metformin      Other reaction(s): Nausea and/or vomiting    Nabumetone GI Intolerance    Other GI Intolerance    Anesthesia S-I-40 [Propofol]     Celecoxib     Cephalexin     Choline Magnesium Trisalicylate     Diclofenac     Diclofenac Potassium(Migraine) GI Intolerance    Diclofenac Sodium GI Intolerance    Fentanyl     Propoxyphene Other (See Comments)     unknown    Sulfa Antibiotics     Sulfacetamide     Zolpidem        Current Outpatient Medications on File Prior to Visit   Medication Sig Dispense Refill    amLODIPine (NORVASC) 5 mg tablet Take 1 tablet (5 mg total) by mouth daily 90 tablet 1    atorvastatin (LIPITOR) 40 mg tablet TAKE 1 TABLET BY MOUTH 4 DAYS PER WEEK 54 tablet 1    Calcium Carbonate-Vitamin D (CALCIUM CREAMIES PO) Take 600 mg by mouth daily      Cholecalciferol ([employer] VITAMIN D3) 1000 units capsule Take 2,000 Units by mouth      clonazePAM (KlonoPIN) 1 mg tablet Take half a tablet (0 5 mg) twice a day as needed for anxiety 30 tablet 3    Cyanocobalamin 1000 MCG/ML LIQD cyanocobalamin (vit B-12) 1,000 mcg/mL injection solution      dicyclomine (BENTYL) 20 mg tablet Take 1 tablet (20 mg total) by mouth 2 (two) times a day as needed (Abdominal bloating/pain) 30 tablet 1    ferrous sulfate 325 (65 Fe) mg tablet Take by oral route        fluticasone (FLONASE) 50 mcg/act nasal spray USE 2 SPRAYS IN EACH NOSTRIL EVERY DAY (Patient not taking: Reported on 5/14/2021) 3 Bottle 3    HYDROcodone-acetaminophen (NORCO) 5-325 mg per tablet Take 1 tablet 2 times a day as needed for severe low back pain 30 tablet 0    levothyroxine 125 mcg tablet TAKE 1 TABLET BY MOUTH EVERY DAY 90 tablet 1    lidocaine (LIDODERM) 5 % Apply 1 or 2 patches to affected areas, remove in 12 hours 60 patch 3    metaxalone (SKELAXIN) 800 mg tablet TAKE 1 TABLET BY MOUTH TWICE A DAY AS NEEDED FOR MUSCLE SPASMS 60 tablet 4    naloxone (NARCAN) 4 mg/0 1 mL nasal spray Administer 1 spray into a nostril  If breathing does not return to normal or if breathing difficulty resumes after 2-3 minutes, give another dose in the other nostril using a new spray   (Patient not taking: Reported on 5/14/2021) 1 each 1    nebivolol (Bystolic) 10 mg tablet Take 1 tablet (10 mg total) by mouth daily 90 tablet 3    omega-3-acid ethyl esters (LOVAZA) 1 g capsule Take 2 g by mouth daily      pantoprazole (PROTONIX) 40 mg tablet TAKE 1 TABLET BY MOUTH DAILY AS NEEDED 30 tablet 6    sertraline (ZOLOFT) 100 mg tablet Take 2 tablets (200 mg total) by mouth daily 180 tablet 3    tiZANidine (ZANAFLEX) 4 mg tablet TAKE 2 TABLETS BY MOUTH AT BEDTIME AS NEEDED FOR MUSCLE SPASMS 180 tablet 1     Current Facility-Administered Medications on File Prior to Visit   Medication Dose Route Frequency Provider Last Rate Last Admin    cyanocobalamin injection 1,000 mcg  1,000 mcg Intramuscular Q30 Days Mei Donato MD   1,000 mcg at 02/21/18 1534    cyanocobalamin injection 1,000 mcg  1,000 mcg Intramuscular Q30 Days Mei Donato MD   1,000 mcg at 03/29/18 0717    cyanocobalamin injection 1,000 mcg  1,000 mcg Intramuscular Q30 Days Mei Donato MD   1,000 mcg at 04/30/18 1737    cyanocobalamin injection 1,000 mcg  1,000 mcg Intramuscular Z05 Days Sukhdev Austin MD   6,531 mcg at 08/10/18 1017    cyanocobalamin injection 1,000 mcg  1,000 mcg Intramuscular Q30 Days Mei Donato MD   1,000 mcg at 09/10/18 1127    cyanocobalamin injection 1,000 mcg  1,000 mcg Intramuscular Q30 Days Yadira Hilario MD   1,000 mcg at 02/11/19 8355    cyanocobalamin injection 1,000 mcg  1,000 mcg Intramuscular Q30 Days Yadira Hilario MD   1,000 mcg at 09/23/19 1739    cyanocobalamin injection 1,000 mcg  1,000 mcg Intramuscular Q30 Days Yadira Hilario MD   1,000 mcg at 10/29/19 2010    cyanocobalamin injection 1,000 mcg  1,000 mcg Intramuscular Q30 Days Eric Bright MD   1,000 mcg at 12/06/19 1513    cyanocobalamin injection 1,000 mcg  1,000 mcg Intramuscular Q30 Days Yadira Hilario MD   1,000 mcg at 07/02/20 1412    cyanocobalamin injection 1,000 mcg  1,000 mcg Intramuscular Q30 Days Yadira Hilario MD   1,000 mcg at 06/01/21 1100       Social History     Tobacco Use    Smoking status: Never Smoker    Smokeless tobacco: Never Used   Substance Use Topics    Alcohol use: Yes     Comment: rare    Drug use: No       Family History   Problem Relation Age of Onset    Heart disease Mother     Skin cancer Mother         unsure of type    Esophageal cancer Father     Kidney cancer Father     Thyroid disease Father     Diabetes Son     Skin cancer Sister         unsure of type    Cervical cancer Maternal Grandmother     No Known Problems Maternal Grandfather     No Known Problems Paternal Grandmother     No Known Problems Paternal Grandfather     No Known Problems Sister     Skin cancer Maternal Aunt         unsure of type    No Known Problems Paternal Aunt     No Known Problems Paternal Aunt        Review of Systems     As stated in the HPI  All other systems were reviewed and are negative        Large joint arthrocentesis: L greater trochanteric bursa  Procedure Details  Location: hip - L greater trochanteric bursa  Needle size: 22 G  Ultrasound guidance: no  Approach: lateral  Medications administered: 4 mL lidocaine 1 %; 1 mL methylPREDNISolone acetate 40 mg/mL    Patient tolerance: patient tolerated the procedure well with no immediate complications  Dressing:  Sterile dressing applied          Scribe Attestation    I,:  Jia Collazo PA-C am acting as a scribe while in the presence of the attending physician :       I,:  Alline Prader, MD personally performed the services described in this documentation    as scribed in my presence :

## 2021-06-11 DIAGNOSIS — K58.0 IRRITABLE BOWEL SYNDROME WITH DIARRHEA: ICD-10-CM

## 2021-06-11 RX ORDER — DICYCLOMINE HCL 20 MG
TABLET ORAL
Qty: 30 TABLET | Refills: 3 | Status: SHIPPED | OUTPATIENT
Start: 2021-06-11 | End: 2021-06-18

## 2021-06-18 DIAGNOSIS — K58.0 IRRITABLE BOWEL SYNDROME WITH DIARRHEA: ICD-10-CM

## 2021-06-18 RX ORDER — DICYCLOMINE HCL 20 MG
TABLET ORAL
Qty: 30 TABLET | Refills: 3 | Status: SHIPPED | OUTPATIENT
Start: 2021-06-18 | End: 2021-07-02

## 2021-06-19 DIAGNOSIS — M51.9 DISC DISORDER OF LUMBAR REGION: ICD-10-CM

## 2021-06-19 RX ORDER — TIZANIDINE 4 MG/1
TABLET ORAL
Qty: 180 TABLET | Refills: 1 | Status: SHIPPED | OUTPATIENT
Start: 2021-06-19 | End: 2021-12-16

## 2021-06-30 ENCOUNTER — ANNUAL EXAM (OUTPATIENT)
Dept: OBGYN CLINIC | Facility: CLINIC | Age: 66
End: 2021-06-30
Payer: MEDICARE

## 2021-06-30 VITALS
SYSTOLIC BLOOD PRESSURE: 142 MMHG | BODY MASS INDEX: 30.04 KG/M2 | HEIGHT: 60 IN | WEIGHT: 153 LBS | DIASTOLIC BLOOD PRESSURE: 78 MMHG

## 2021-06-30 DIAGNOSIS — N90.89 VULVAR LESION: ICD-10-CM

## 2021-06-30 DIAGNOSIS — Z12.31 ENCOUNTER FOR SCREENING MAMMOGRAM FOR BREAST CANCER: ICD-10-CM

## 2021-06-30 DIAGNOSIS — F41.1 GAD (GENERALIZED ANXIETY DISORDER): ICD-10-CM

## 2021-06-30 DIAGNOSIS — Z01.419 ENCOUNTER FOR ANNUAL ROUTINE GYNECOLOGICAL EXAMINATION: Primary | ICD-10-CM

## 2021-06-30 DIAGNOSIS — M51.36 DDD (DEGENERATIVE DISC DISEASE), LUMBAR: ICD-10-CM

## 2021-06-30 DIAGNOSIS — R92.1 CALCIFICATION OF LEFT BREAST ON MAMMOGRAPHY: ICD-10-CM

## 2021-06-30 PROCEDURE — G0145 SCR C/V CYTO,THINLAYER,RESCR: HCPCS | Performed by: OBSTETRICS & GYNECOLOGY

## 2021-06-30 PROCEDURE — G0101 CA SCREEN;PELVIC/BREAST EXAM: HCPCS | Performed by: OBSTETRICS & GYNECOLOGY

## 2021-06-30 PROCEDURE — G0476 HPV COMBO ASSAY CA SCREEN: HCPCS | Performed by: OBSTETRICS & GYNECOLOGY

## 2021-06-30 RX ORDER — CLONAZEPAM 1 MG/1
TABLET ORAL
Qty: 30 TABLET | Refills: 5 | Status: SHIPPED | OUTPATIENT
Start: 2021-06-30 | End: 2022-02-15 | Stop reason: SDUPTHER

## 2021-06-30 RX ORDER — OMEPRAZOLE 20 MG/1
CAPSULE, DELAYED RELEASE ORAL
COMMUNITY

## 2021-06-30 RX ORDER — HYDROCODONE BITARTRATE AND ACETAMINOPHEN 5; 325 MG/1; MG/1
TABLET ORAL
Qty: 30 TABLET | Refills: 0 | Status: SHIPPED | OUTPATIENT
Start: 2021-06-30 | End: 2021-08-23 | Stop reason: SDUPTHER
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
## 2021-07-01 ENCOUNTER — CLINICAL SUPPORT (OUTPATIENT)
Dept: FAMILY MEDICINE CLINIC | Facility: CLINIC | Age: 66
End: 2021-07-01
Payer: MEDICARE

## 2021-07-01 ENCOUNTER — APPOINTMENT (OUTPATIENT)
Dept: LAB | Facility: CLINIC | Age: 66
End: 2021-07-01
Payer: MEDICARE

## 2021-07-01 VITALS — TEMPERATURE: 97.8 F

## 2021-07-01 DIAGNOSIS — E03.9 HYPOTHYROIDISM, UNSPECIFIED TYPE: ICD-10-CM

## 2021-07-01 DIAGNOSIS — E53.8 VITAMIN B 12 DEFICIENCY: ICD-10-CM

## 2021-07-01 DIAGNOSIS — E53.8 VITAMIN B 12 DEFICIENCY: Primary | ICD-10-CM

## 2021-07-01 DIAGNOSIS — R10.12 LUQ PAIN: ICD-10-CM

## 2021-07-01 DIAGNOSIS — E78.2 MIXED HYPERLIPIDEMIA: ICD-10-CM

## 2021-07-01 LAB
ALBUMIN SERPL BCP-MCNC: 4 G/DL (ref 3.5–5)
ALP SERPL-CCNC: 98 U/L (ref 46–116)
ALT SERPL W P-5'-P-CCNC: 24 U/L (ref 12–78)
ANION GAP SERPL CALCULATED.3IONS-SCNC: 4 MMOL/L (ref 4–13)
AST SERPL W P-5'-P-CCNC: 17 U/L (ref 5–45)
BILIRUB SERPL-MCNC: 0.47 MG/DL (ref 0.2–1)
BUN SERPL-MCNC: 18 MG/DL (ref 5–25)
CALCIUM SERPL-MCNC: 9.2 MG/DL (ref 8.3–10.1)
CHLORIDE SERPL-SCNC: 105 MMOL/L (ref 100–108)
CO2 SERPL-SCNC: 31 MMOL/L (ref 21–32)
CREAT SERPL-MCNC: 0.62 MG/DL (ref 0.6–1.3)
GFR SERPL CREATININE-BSD FRML MDRD: 94 ML/MIN/1.73SQ M
GLUCOSE P FAST SERPL-MCNC: 91 MG/DL (ref 65–99)
POTASSIUM SERPL-SCNC: 4.1 MMOL/L (ref 3.5–5.3)
PROT SERPL-MCNC: 7.7 G/DL (ref 6.4–8.2)
SODIUM SERPL-SCNC: 140 MMOL/L (ref 136–145)

## 2021-07-01 PROCEDURE — 96372 THER/PROPH/DIAG INJ SC/IM: CPT

## 2021-07-01 PROCEDURE — 36415 COLL VENOUS BLD VENIPUNCTURE: CPT

## 2021-07-01 PROCEDURE — 80053 COMPREHEN METABOLIC PANEL: CPT

## 2021-07-02 ENCOUNTER — TELEPHONE (OUTPATIENT)
Dept: FAMILY MEDICINE CLINIC | Facility: CLINIC | Age: 66
End: 2021-07-02

## 2021-07-02 DIAGNOSIS — K58.0 IRRITABLE BOWEL SYNDROME WITH DIARRHEA: ICD-10-CM

## 2021-07-02 LAB
HPV HR 12 DNA CVX QL NAA+PROBE: NEGATIVE
HPV16 DNA CVX QL NAA+PROBE: NEGATIVE
HPV18 DNA CVX QL NAA+PROBE: NEGATIVE

## 2021-07-02 RX ORDER — CYANOCOBALAMIN 1000 UG/ML
1000 INJECTION INTRAMUSCULAR; SUBCUTANEOUS ONCE
Status: COMPLETED | OUTPATIENT
Start: 2021-07-02 | End: 2021-07-02

## 2021-07-02 RX ORDER — DICYCLOMINE HCL 20 MG
TABLET ORAL
Qty: 30 TABLET | Refills: 5 | Status: SHIPPED | OUTPATIENT
Start: 2021-07-02 | End: 2022-07-09

## 2021-07-02 RX ADMIN — CYANOCOBALAMIN 1000 MCG: 1000 INJECTION INTRAMUSCULAR; SUBCUTANEOUS at 08:06

## 2021-07-02 NOTE — TELEPHONE ENCOUNTER
----- Message from Keo Win MD sent at 7/1/2021  5:32 PM EDT -----   Please contact patient  Comprehensive metabolic panel was normal     Rest of the blood work was not performed as ordered           Thank you

## 2021-07-07 LAB
LAB AP GYN PRIMARY INTERPRETATION: NORMAL
Lab: NORMAL

## 2021-07-08 ENCOUNTER — TELEPHONE (OUTPATIENT)
Dept: FAMILY MEDICINE CLINIC | Facility: CLINIC | Age: 66
End: 2021-07-08

## 2021-08-03 ENCOUNTER — CLINICAL SUPPORT (OUTPATIENT)
Dept: FAMILY MEDICINE CLINIC | Facility: CLINIC | Age: 66
End: 2021-08-03
Payer: MEDICARE

## 2021-08-03 DIAGNOSIS — E53.8 VITAMIN B 12 DEFICIENCY: Primary | ICD-10-CM

## 2021-08-03 PROCEDURE — 96372 THER/PROPH/DIAG INJ SC/IM: CPT

## 2021-08-03 RX ADMIN — CYANOCOBALAMIN 1000 MCG: 1000 INJECTION INTRAMUSCULAR; SUBCUTANEOUS at 14:13

## 2021-08-04 RX ORDER — CYANOCOBALAMIN 1000 UG/ML
1000 INJECTION INTRAMUSCULAR; SUBCUTANEOUS ONCE
Status: COMPLETED | OUTPATIENT
Start: 2021-08-04 | End: 2021-08-03

## 2021-08-10 DIAGNOSIS — I10 BENIGN ESSENTIAL HYPERTENSION: ICD-10-CM

## 2021-08-10 RX ORDER — AMLODIPINE BESYLATE 5 MG/1
TABLET ORAL
Qty: 90 TABLET | Refills: 1 | Status: SHIPPED | OUTPATIENT
Start: 2021-08-10 | End: 2022-02-04

## 2021-08-18 ENCOUNTER — PROCEDURE VISIT (OUTPATIENT)
Dept: OBGYN CLINIC | Facility: CLINIC | Age: 66
End: 2021-08-18
Payer: MEDICARE

## 2021-08-18 VITALS
WEIGHT: 150 LBS | HEIGHT: 60 IN | DIASTOLIC BLOOD PRESSURE: 78 MMHG | SYSTOLIC BLOOD PRESSURE: 136 MMHG | BODY MASS INDEX: 29.45 KG/M2

## 2021-08-18 DIAGNOSIS — N90.89 LABIAL LESION: Primary | ICD-10-CM

## 2021-08-18 PROCEDURE — 88305 TISSUE EXAM BY PATHOLOGIST: CPT | Performed by: PATHOLOGY

## 2021-08-18 PROCEDURE — 56605 BIOPSY OF VULVA/PERINEUM: CPT | Performed by: OBSTETRICS & GYNECOLOGY

## 2021-08-18 PROCEDURE — 88313 SPECIAL STAINS GROUP 2: CPT | Performed by: PATHOLOGY

## 2021-08-18 PROCEDURE — 88312 SPECIAL STAINS GROUP 1: CPT | Performed by: PATHOLOGY

## 2021-08-18 NOTE — PROGRESS NOTES
Procedures       This is a very pleasant 44-year-old female P2 (  x2) presents for evaluation of vulvar biopsy  Patient was consented for vulvar /right labial superior biopsy  It was noted area of leukoplakia on her last annual visit 2021  Patient is asymptomatic, denying any itching or irritation  There is no vaginal bleeding or spotting  No vaginal discharge  Next attention was then drawn to the right superior aspect of the labia  Area of leukoplakia was noted approximately less than 1cm  The area was then cleansed with Betadine solution  Using 1% lidocaine the area was then anesthetized  Using key punch biopsy specimen was then amputated at the base  Hemostasis was maintained using Monsel's solution  Patient tolerated the procedure well  Patient will call for results in 1 week  Briefly discussed possible diagnosis including chronic inflammation /mild dysplasia  Reviewed wound care  All questions answered

## 2021-08-23 DIAGNOSIS — M51.36 DDD (DEGENERATIVE DISC DISEASE), LUMBAR: ICD-10-CM

## 2021-08-23 RX ORDER — HYDROCODONE BITARTRATE AND ACETAMINOPHEN 5; 325 MG/1; MG/1
TABLET ORAL
Qty: 30 TABLET | Refills: 0 | Status: SHIPPED | OUTPATIENT
Start: 2021-08-23 | End: 2021-09-23 | Stop reason: SDUPTHER

## 2021-08-30 ENCOUNTER — TELEPHONE (OUTPATIENT)
Dept: OBGYN CLINIC | Facility: CLINIC | Age: 66
End: 2021-08-30

## 2021-08-30 NOTE — TELEPHONE ENCOUNTER
----- Message from Amilcar Lopes, DO sent at 8/29/2021  3:43 PM EDT -----  Inform pt bx reveals no abnormal cells, more so inclusion cyst, may resume annual gyn exams

## 2021-08-31 DIAGNOSIS — M51.9 DISC DISORDER OF LUMBAR REGION: ICD-10-CM

## 2021-09-01 RX ORDER — METAXALONE 800 MG/1
TABLET ORAL
Qty: 60 TABLET | Refills: 4 | Status: SHIPPED | OUTPATIENT
Start: 2021-09-01 | End: 2022-04-05 | Stop reason: SDUPTHER

## 2021-09-19 DIAGNOSIS — E78.2 MIXED HYPERLIPIDEMIA: ICD-10-CM

## 2021-09-20 RX ORDER — ATORVASTATIN CALCIUM 40 MG/1
TABLET, FILM COATED ORAL
Qty: 54 TABLET | Refills: 1 | Status: SHIPPED | OUTPATIENT
Start: 2021-09-20 | End: 2022-03-07

## 2021-09-23 DIAGNOSIS — M51.36 DDD (DEGENERATIVE DISC DISEASE), LUMBAR: ICD-10-CM

## 2021-09-23 RX ORDER — HYDROCODONE BITARTRATE AND ACETAMINOPHEN 5; 325 MG/1; MG/1
TABLET ORAL
Qty: 30 TABLET | Refills: 0 | Status: SHIPPED | OUTPATIENT
Start: 2021-09-23 | End: 2021-10-27 | Stop reason: SDUPTHER

## 2021-09-24 ENCOUNTER — CLINICAL SUPPORT (OUTPATIENT)
Dept: FAMILY MEDICINE CLINIC | Facility: CLINIC | Age: 66
End: 2021-09-24
Payer: MEDICARE

## 2021-09-24 VITALS — TEMPERATURE: 97.8 F

## 2021-09-24 DIAGNOSIS — E53.8 VITAMIN B 12 DEFICIENCY: Primary | ICD-10-CM

## 2021-09-24 PROCEDURE — 96372 THER/PROPH/DIAG INJ SC/IM: CPT | Performed by: FAMILY MEDICINE

## 2021-09-24 RX ORDER — CYANOCOBALAMIN 1000 UG/ML
1000 INJECTION INTRAMUSCULAR; SUBCUTANEOUS ONCE
Status: COMPLETED | OUTPATIENT
Start: 2021-09-24 | End: 2021-09-24

## 2021-09-24 RX ADMIN — CYANOCOBALAMIN 1000 MCG: 1000 INJECTION INTRAMUSCULAR; SUBCUTANEOUS at 13:33

## 2021-10-13 NOTE — TELEPHONE ENCOUNTER
Hpi Title: Evaluation of Skin Lesions
Please sent to pharmacy   TY
How Severe Are Your Spot(S)?: mild
Have Your Spot(S) Been Treated In The Past?: has not been treated

## 2021-10-27 DIAGNOSIS — M51.36 DDD (DEGENERATIVE DISC DISEASE), LUMBAR: ICD-10-CM

## 2021-10-27 RX ORDER — HYDROCODONE BITARTRATE AND ACETAMINOPHEN 5; 325 MG/1; MG/1
TABLET ORAL
Qty: 30 TABLET | Refills: 0 | Status: SHIPPED | OUTPATIENT
Start: 2021-10-27 | End: 2021-12-16 | Stop reason: SDUPTHER

## 2021-10-28 ENCOUNTER — HOSPITAL ENCOUNTER (EMERGENCY)
Facility: HOSPITAL | Age: 66
Discharge: HOME | End: 2021-10-28
Attending: EMERGENCY MEDICINE
Payer: MEDICARE

## 2021-10-28 ENCOUNTER — APPOINTMENT (EMERGENCY)
Dept: RADIOLOGY | Facility: HOSPITAL | Age: 66
End: 2021-10-28
Payer: MEDICARE

## 2021-10-28 VITALS
BODY MASS INDEX: 29.84 KG/M2 | TEMPERATURE: 97.5 F | SYSTOLIC BLOOD PRESSURE: 159 MMHG | OXYGEN SATURATION: 95 % | RESPIRATION RATE: 18 BRPM | HEART RATE: 57 BPM | WEIGHT: 152 LBS | DIASTOLIC BLOOD PRESSURE: 70 MMHG | HEIGHT: 60 IN

## 2021-10-28 DIAGNOSIS — M25.512 CHRONIC LEFT SHOULDER PAIN: Primary | ICD-10-CM

## 2021-10-28 DIAGNOSIS — G89.29 CHRONIC LEFT SHOULDER PAIN: Primary | ICD-10-CM

## 2021-10-28 PROCEDURE — 99283 EMERGENCY DEPT VISIT LOW MDM: CPT

## 2021-10-28 PROCEDURE — 99284 EMERGENCY DEPT VISIT MOD MDM: CPT | Performed by: PHYSICIAN ASSISTANT

## 2021-10-28 PROCEDURE — 73030 X-RAY EXAM OF SHOULDER: CPT

## 2021-11-02 ENCOUNTER — CLINICAL SUPPORT (OUTPATIENT)
Dept: FAMILY MEDICINE CLINIC | Facility: CLINIC | Age: 66
End: 2021-11-02
Payer: MEDICARE

## 2021-11-02 VITALS — TEMPERATURE: 98.2 F

## 2021-11-02 DIAGNOSIS — E53.8 VITAMIN B 12 DEFICIENCY: Primary | ICD-10-CM

## 2021-11-02 PROCEDURE — 96372 THER/PROPH/DIAG INJ SC/IM: CPT

## 2021-11-02 RX ORDER — CYANOCOBALAMIN 1000 UG/ML
1000 INJECTION INTRAMUSCULAR; SUBCUTANEOUS ONCE
Status: COMPLETED | OUTPATIENT
Start: 2021-11-02 | End: 2021-11-02

## 2021-11-02 RX ADMIN — CYANOCOBALAMIN 1000 MCG: 1000 INJECTION INTRAMUSCULAR; SUBCUTANEOUS at 13:35

## 2021-11-06 DIAGNOSIS — E03.9 HYPOTHYROIDISM, UNSPECIFIED TYPE: ICD-10-CM

## 2021-11-09 RX ORDER — LEVOTHYROXINE SODIUM 0.12 MG/1
TABLET ORAL
Qty: 90 TABLET | Refills: 1 | Status: SHIPPED | OUTPATIENT
Start: 2021-11-09 | End: 2022-05-13

## 2021-11-15 ENCOUNTER — APPOINTMENT (OUTPATIENT)
Dept: LAB | Facility: CLINIC | Age: 66
End: 2021-11-15
Payer: MEDICARE

## 2021-11-15 ENCOUNTER — OFFICE VISIT (OUTPATIENT)
Dept: FAMILY MEDICINE CLINIC | Facility: CLINIC | Age: 66
End: 2021-11-15
Payer: MEDICARE

## 2021-11-15 VITALS
WEIGHT: 150.4 LBS | HEART RATE: 67 BPM | DIASTOLIC BLOOD PRESSURE: 70 MMHG | TEMPERATURE: 98.2 F | BODY MASS INDEX: 29.53 KG/M2 | SYSTOLIC BLOOD PRESSURE: 128 MMHG | OXYGEN SATURATION: 98 % | HEIGHT: 60 IN | RESPIRATION RATE: 16 BRPM

## 2021-11-15 DIAGNOSIS — I47.1 PSVT (PAROXYSMAL SUPRAVENTRICULAR TACHYCARDIA) (HCC): ICD-10-CM

## 2021-11-15 DIAGNOSIS — M75.42 IMPINGEMENT SYNDROME OF LEFT SHOULDER: ICD-10-CM

## 2021-11-15 DIAGNOSIS — E53.8 VITAMIN B 12 DEFICIENCY: ICD-10-CM

## 2021-11-15 DIAGNOSIS — E03.9 HYPOTHYROIDISM, UNSPECIFIED TYPE: ICD-10-CM

## 2021-11-15 DIAGNOSIS — Z00.00 MEDICARE ANNUAL WELLNESS VISIT, SUBSEQUENT: ICD-10-CM

## 2021-11-15 DIAGNOSIS — F11.20 CONTINUOUS OPIOID DEPENDENCE (HCC): ICD-10-CM

## 2021-11-15 DIAGNOSIS — E78.2 MIXED HYPERLIPIDEMIA: ICD-10-CM

## 2021-11-15 DIAGNOSIS — Z78.0 MENOPAUSE: ICD-10-CM

## 2021-11-15 DIAGNOSIS — E55.9 VITAMIN D DEFICIENCY: ICD-10-CM

## 2021-11-15 DIAGNOSIS — G47.33 OBSTRUCTIVE SLEEP APNEA SYNDROME: ICD-10-CM

## 2021-11-15 DIAGNOSIS — I10 BENIGN ESSENTIAL HYPERTENSION: ICD-10-CM

## 2021-11-15 DIAGNOSIS — F41.8 DEPRESSION WITH ANXIETY: ICD-10-CM

## 2021-11-15 DIAGNOSIS — Z00.00 MEDICARE ANNUAL WELLNESS VISIT, SUBSEQUENT: Primary | ICD-10-CM

## 2021-11-15 LAB
25(OH)D3 SERPL-MCNC: 37.2 NG/ML (ref 30–100)
ALBUMIN SERPL BCP-MCNC: 3.9 G/DL (ref 3.5–5)
ALP SERPL-CCNC: 112 U/L (ref 46–116)
ALT SERPL W P-5'-P-CCNC: 21 U/L (ref 12–78)
ANION GAP SERPL CALCULATED.3IONS-SCNC: 6 MMOL/L (ref 4–13)
AST SERPL W P-5'-P-CCNC: 19 U/L (ref 5–45)
BASOPHILS # BLD AUTO: 0.03 THOUSANDS/ΜL (ref 0–0.1)
BASOPHILS NFR BLD AUTO: 0 % (ref 0–1)
BILIRUB SERPL-MCNC: 0.39 MG/DL (ref 0.2–1)
BUN SERPL-MCNC: 17 MG/DL (ref 5–25)
CALCIUM SERPL-MCNC: 9.4 MG/DL (ref 8.3–10.1)
CHLORIDE SERPL-SCNC: 102 MMOL/L (ref 100–108)
CHOLEST SERPL-MCNC: 172 MG/DL (ref 50–200)
CO2 SERPL-SCNC: 30 MMOL/L (ref 21–32)
CREAT SERPL-MCNC: 0.61 MG/DL (ref 0.6–1.3)
EOSINOPHIL # BLD AUTO: 0.13 THOUSAND/ΜL (ref 0–0.61)
EOSINOPHIL NFR BLD AUTO: 2 % (ref 0–6)
ERYTHROCYTE [DISTWIDTH] IN BLOOD BY AUTOMATED COUNT: 12.6 % (ref 11.6–15.1)
FOLATE SERPL-MCNC: 8.5 NG/ML (ref 3.1–17.5)
GFR SERPL CREATININE-BSD FRML MDRD: 94 ML/MIN/1.73SQ M
GLUCOSE P FAST SERPL-MCNC: 93 MG/DL (ref 65–99)
HCT VFR BLD AUTO: 37.8 % (ref 34.8–46.1)
HDLC SERPL-MCNC: 47 MG/DL
HGB BLD-MCNC: 12.4 G/DL (ref 11.5–15.4)
IMM GRANULOCYTES # BLD AUTO: 0.03 THOUSAND/UL (ref 0–0.2)
IMM GRANULOCYTES NFR BLD AUTO: 0 % (ref 0–2)
LDLC SERPL CALC-MCNC: 91 MG/DL (ref 0–100)
LIPASE SERPL-CCNC: 50 U/L (ref 73–393)
LYMPHOCYTES # BLD AUTO: 1.27 THOUSANDS/ΜL (ref 0.6–4.47)
LYMPHOCYTES NFR BLD AUTO: 16 % (ref 14–44)
MCH RBC QN AUTO: 30 PG (ref 26.8–34.3)
MCHC RBC AUTO-ENTMCNC: 32.8 G/DL (ref 31.4–37.4)
MCV RBC AUTO: 92 FL (ref 82–98)
MONOCYTES # BLD AUTO: 0.44 THOUSAND/ΜL (ref 0.17–1.22)
MONOCYTES NFR BLD AUTO: 6 % (ref 4–12)
NEUTROPHILS # BLD AUTO: 5.97 THOUSANDS/ΜL (ref 1.85–7.62)
NEUTS SEG NFR BLD AUTO: 76 % (ref 43–75)
NRBC BLD AUTO-RTO: 0 /100 WBCS
PLATELET # BLD AUTO: 218 THOUSANDS/UL (ref 149–390)
PMV BLD AUTO: 9.5 FL (ref 8.9–12.7)
POTASSIUM SERPL-SCNC: 4.5 MMOL/L (ref 3.5–5.3)
PROT SERPL-MCNC: 8.4 G/DL (ref 6.4–8.2)
RBC # BLD AUTO: 4.13 MILLION/UL (ref 3.81–5.12)
SODIUM SERPL-SCNC: 138 MMOL/L (ref 136–145)
TRIGL SERPL-MCNC: 172 MG/DL
TSH SERPL DL<=0.05 MIU/L-ACNC: 1.36 UIU/ML (ref 0.36–3.74)
VIT B12 SERPL-MCNC: 457 PG/ML (ref 100–900)
WBC # BLD AUTO: 7.87 THOUSAND/UL (ref 4.31–10.16)

## 2021-11-15 PROCEDURE — 82306 VITAMIN D 25 HYDROXY: CPT

## 2021-11-15 PROCEDURE — 36415 COLL VENOUS BLD VENIPUNCTURE: CPT

## 2021-11-15 PROCEDURE — 83690 ASSAY OF LIPASE: CPT

## 2021-11-15 PROCEDURE — 1123F ACP DISCUSS/DSCN MKR DOCD: CPT | Performed by: FAMILY MEDICINE

## 2021-11-15 PROCEDURE — 80053 COMPREHEN METABOLIC PANEL: CPT

## 2021-11-15 PROCEDURE — 80061 LIPID PANEL: CPT

## 2021-11-15 PROCEDURE — 82607 VITAMIN B-12: CPT

## 2021-11-15 PROCEDURE — G0439 PPPS, SUBSEQ VISIT: HCPCS | Performed by: FAMILY MEDICINE

## 2021-11-15 PROCEDURE — 85025 COMPLETE CBC W/AUTO DIFF WBC: CPT

## 2021-11-15 PROCEDURE — 84443 ASSAY THYROID STIM HORMONE: CPT

## 2021-11-15 PROCEDURE — 82746 ASSAY OF FOLIC ACID SERUM: CPT

## 2021-11-15 PROCEDURE — 99214 OFFICE O/P EST MOD 30 MIN: CPT | Performed by: FAMILY MEDICINE

## 2021-11-15 RX ORDER — METHYLPREDNISOLONE 4 MG/1
TABLET ORAL
Qty: 21 EACH | Refills: 0 | Status: SHIPPED | OUTPATIENT
Start: 2021-11-15 | End: 2022-03-31

## 2021-11-17 PROBLEM — R10.12 LUQ PAIN: Status: RESOLVED | Noted: 2021-05-14 | Resolved: 2021-11-17

## 2021-11-18 ENCOUNTER — TELEPHONE (OUTPATIENT)
Dept: FAMILY MEDICINE CLINIC | Facility: CLINIC | Age: 66
End: 2021-11-18

## 2021-11-18 DIAGNOSIS — E03.9 HYPOTHYROIDISM, UNSPECIFIED TYPE: Primary | ICD-10-CM

## 2021-11-18 DIAGNOSIS — E78.2 MIXED HYPERLIPIDEMIA: ICD-10-CM

## 2021-11-18 DIAGNOSIS — E53.8 VITAMIN B 12 DEFICIENCY: ICD-10-CM

## 2021-11-19 ENCOUNTER — OFFICE VISIT (OUTPATIENT)
Dept: OBGYN CLINIC | Facility: CLINIC | Age: 66
End: 2021-11-19
Payer: MEDICARE

## 2021-11-19 VITALS
SYSTOLIC BLOOD PRESSURE: 140 MMHG | BODY MASS INDEX: 29.06 KG/M2 | WEIGHT: 148 LBS | HEIGHT: 60 IN | DIASTOLIC BLOOD PRESSURE: 85 MMHG

## 2021-11-19 DIAGNOSIS — M19.012 ARTHRITIS OF LEFT ACROMIOCLAVICULAR JOINT: Primary | ICD-10-CM

## 2021-11-19 DIAGNOSIS — M75.42 IMPINGEMENT SYNDROME OF LEFT SHOULDER: ICD-10-CM

## 2021-11-19 DIAGNOSIS — M75.82 ROTATOR CUFF TENDONITIS, LEFT: ICD-10-CM

## 2021-11-19 PROCEDURE — 99213 OFFICE O/P EST LOW 20 MIN: CPT | Performed by: ORTHOPAEDIC SURGERY

## 2021-12-02 ENCOUNTER — CLINICAL SUPPORT (OUTPATIENT)
Dept: FAMILY MEDICINE CLINIC | Facility: CLINIC | Age: 66
End: 2021-12-02
Payer: MEDICARE

## 2021-12-02 DIAGNOSIS — E53.8 VITAMIN B 12 DEFICIENCY: Primary | ICD-10-CM

## 2021-12-02 PROCEDURE — 96372 THER/PROPH/DIAG INJ SC/IM: CPT

## 2021-12-02 RX ORDER — CYANOCOBALAMIN 1000 UG/ML
1000 INJECTION INTRAMUSCULAR; SUBCUTANEOUS ONCE
Status: COMPLETED | OUTPATIENT
Start: 2021-12-02 | End: 2021-12-02

## 2021-12-02 RX ADMIN — CYANOCOBALAMIN 1000 MCG: 1000 INJECTION INTRAMUSCULAR; SUBCUTANEOUS at 13:57

## 2021-12-06 NOTE — ASSESSMENT & PLAN NOTE
Have You Had Botox Before?: has had botox · Chronic severe low back/spine pain due to scoliosis and multilevel arthritic disc disease/stenosis  · Patient uses Vicodin very sparingly as needed, no history of misuse    · She remains on regimen of Skelaxin daytime and tizanidine nighttime

## 2021-12-16 DIAGNOSIS — M51.36 DDD (DEGENERATIVE DISC DISEASE), LUMBAR: ICD-10-CM

## 2021-12-16 DIAGNOSIS — M51.9 DISC DISORDER OF LUMBAR REGION: ICD-10-CM

## 2021-12-16 RX ORDER — TIZANIDINE 4 MG/1
TABLET ORAL
Qty: 180 TABLET | Refills: 1 | Status: SHIPPED | OUTPATIENT
Start: 2021-12-16 | End: 2022-06-16

## 2021-12-17 RX ORDER — HYDROCODONE BITARTRATE AND ACETAMINOPHEN 5; 325 MG/1; MG/1
TABLET ORAL
Qty: 30 TABLET | Refills: 0 | Status: SHIPPED | OUTPATIENT
Start: 2021-12-17 | End: 2022-03-11 | Stop reason: SDUPTHER

## 2021-12-23 ENCOUNTER — OFFICE VISIT (OUTPATIENT)
Dept: OBGYN CLINIC | Facility: MEDICAL CENTER | Age: 66
End: 2021-12-23
Payer: MEDICARE

## 2021-12-23 VITALS
HEART RATE: 73 BPM | WEIGHT: 148 LBS | BODY MASS INDEX: 29.06 KG/M2 | SYSTOLIC BLOOD PRESSURE: 166 MMHG | DIASTOLIC BLOOD PRESSURE: 84 MMHG | HEIGHT: 60 IN

## 2021-12-23 DIAGNOSIS — M75.42 IMPINGEMENT SYNDROME OF LEFT SHOULDER: ICD-10-CM

## 2021-12-23 DIAGNOSIS — M75.82 ROTATOR CUFF TENDONITIS, LEFT: ICD-10-CM

## 2021-12-23 DIAGNOSIS — M19.012 ARTHRITIS OF LEFT ACROMIOCLAVICULAR JOINT: Primary | ICD-10-CM

## 2021-12-23 PROCEDURE — 99213 OFFICE O/P EST LOW 20 MIN: CPT | Performed by: ORTHOPAEDIC SURGERY

## 2021-12-23 PROCEDURE — 20610 DRAIN/INJ JOINT/BURSA W/O US: CPT | Performed by: ORTHOPAEDIC SURGERY

## 2021-12-23 RX ORDER — LIDOCAINE HYDROCHLORIDE 10 MG/ML
4 INJECTION, SOLUTION INFILTRATION; PERINEURAL
Status: COMPLETED | OUTPATIENT
Start: 2021-12-23 | End: 2021-12-23

## 2021-12-23 RX ORDER — TRIAMCINOLONE ACETONIDE 40 MG/ML
40 INJECTION, SUSPENSION INTRA-ARTICULAR; INTRAMUSCULAR
Status: COMPLETED | OUTPATIENT
Start: 2021-12-23 | End: 2021-12-23

## 2021-12-23 RX ADMIN — TRIAMCINOLONE ACETONIDE 40 MG: 40 INJECTION, SUSPENSION INTRA-ARTICULAR; INTRAMUSCULAR at 08:51

## 2021-12-23 RX ADMIN — LIDOCAINE HYDROCHLORIDE 4 ML: 10 INJECTION, SOLUTION INFILTRATION; PERINEURAL at 08:51

## 2021-12-27 ENCOUNTER — VBI (OUTPATIENT)
Dept: ADMINISTRATIVE | Facility: OTHER | Age: 66
End: 2021-12-27

## 2022-01-03 ENCOUNTER — CLINICAL SUPPORT (OUTPATIENT)
Dept: FAMILY MEDICINE CLINIC | Facility: CLINIC | Age: 67
End: 2022-01-03
Payer: MEDICARE

## 2022-01-03 DIAGNOSIS — E53.8 VITAMIN B 12 DEFICIENCY: Primary | ICD-10-CM

## 2022-01-03 PROCEDURE — 96372 THER/PROPH/DIAG INJ SC/IM: CPT | Performed by: FAMILY MEDICINE

## 2022-01-03 RX ORDER — CYANOCOBALAMIN 1000 UG/ML
1000 INJECTION INTRAMUSCULAR; SUBCUTANEOUS ONCE
Status: COMPLETED | OUTPATIENT
Start: 2022-01-03 | End: 2022-01-03

## 2022-01-03 RX ADMIN — CYANOCOBALAMIN 1000 MCG: 1000 INJECTION INTRAMUSCULAR; SUBCUTANEOUS at 13:39

## 2022-02-04 ENCOUNTER — CLINICAL SUPPORT (OUTPATIENT)
Dept: FAMILY MEDICINE CLINIC | Facility: CLINIC | Age: 67
End: 2022-02-04

## 2022-02-04 DIAGNOSIS — I10 BENIGN ESSENTIAL HYPERTENSION: ICD-10-CM

## 2022-02-04 DIAGNOSIS — E53.8 B12 DEFICIENCY: Primary | ICD-10-CM

## 2022-02-04 RX ORDER — AMLODIPINE BESYLATE 5 MG/1
TABLET ORAL
Qty: 90 TABLET | Refills: 1 | Status: SHIPPED | OUTPATIENT
Start: 2022-02-04 | End: 2022-07-31

## 2022-02-15 DIAGNOSIS — F41.1 GAD (GENERALIZED ANXIETY DISORDER): ICD-10-CM

## 2022-02-15 RX ORDER — CLONAZEPAM 1 MG/1
TABLET ORAL
Qty: 30 TABLET | Refills: 5 | Status: SHIPPED | OUTPATIENT
Start: 2022-02-15

## 2022-02-28 ENCOUNTER — EVALUATION (OUTPATIENT)
Dept: PHYSICAL THERAPY | Facility: CLINIC | Age: 67
End: 2022-02-28
Payer: MEDICARE

## 2022-02-28 DIAGNOSIS — M75.82 ROTATOR CUFF TENDONITIS, LEFT: ICD-10-CM

## 2022-02-28 DIAGNOSIS — M19.012 ARTHRITIS OF LEFT ACROMIOCLAVICULAR JOINT: Primary | ICD-10-CM

## 2022-02-28 PROCEDURE — 97110 THERAPEUTIC EXERCISES: CPT | Performed by: PHYSICAL THERAPIST

## 2022-02-28 PROCEDURE — 97161 PT EVAL LOW COMPLEX 20 MIN: CPT | Performed by: PHYSICAL THERAPIST

## 2022-02-28 NOTE — PROGRESS NOTES
PT Evaluation     Today's date: 2022  Patient name: Silvano Betancourt  : 1955  MRN: [id number]  Referring provider: William Del Real DO  Dx:   Encounter Diagnosis     ICD-10-CM    1  Arthritis of left acromioclavicular joint  M19 012 Ambulatory referral to Physical Therapy   2  Rotator cuff tendonitis, left  M75 82 Ambulatory referral to Physical Therapy                  Assessment  Assessment details: Silvano Betancourt is a 78 y o  female who presents with chronic L shoulder pain  Pt has decreased L UE strength and ROM as well as pain with function  Pt currently has difficulty lifting items, pain with reaching above head/across body/behind back  Pt would benefit from skilled PT to address the above impairments and to return to pain free function  Impairments: abnormal or restricted ROM, impaired physical strength, lacks appropriate home exercise program and pain with function  Functional limitations: difficulty lifting items, pain with reaching above head/across body/behind back  Symptom irritability: moderateUnderstanding of Dx/Px/POC: good   Prognosis: good    Goals  1  Pt will be independent with HEP upon DC  2  Pt's L shoulder ROM will be WNL to allow for reaching with ease  3  Pt's L shoulder strength will be at least 4/5 t/o to allow for lifting things  4  Pt's pain will  Be no more than 3/10 to allow for return to PLOF      Plan  Patient would benefit from: skilled physical therapy  Planned modality interventions: low level laser therapy  Planned therapy interventions: joint mobilization, manual therapy, neuromuscular re-education, patient education, therapeutic activities, therapeutic exercise, strengthening and home exercise program  Frequency: 2x week  Duration in visits: 12  Duration in weeks: 6  Treatment plan discussed with: patient        Subjective Evaluation    History of Present Illness  Date of onset: 2021  Mechanism of injury: Pt reports an insidious onset of L shoulder pain that started a year ago  Pt had an injection in December which substantially decreased her pain  X-rays revealed arthritis  Pt reports that anti-inflammatories help with pain levels as well  Pt presents to OP PT  Not a recurrent problem   Quality of life: good    Pain  Current pain ratin  At best pain ratin  At worst pain ratin  Location: L shoulder  Quality: dull ache  Relieving factors: rest  Progression: improved    Hand dominance: right      Diagnostic Tests  X-ray: abnormal  Treatments  Previous treatment: injection treatment and medication  Patient Goals  Patient goals for therapy: decreased pain          Objective     Palpation   Left   Tenderness of the infraspinatus, subscapularis and supraspinatus  Tenderness     Left Shoulder   Tenderness in the biceps tendon (proximal)  Active Range of Motion   Left Shoulder   Flexion: 130 degrees   Abduction: 120 degrees   External rotation 90°: 60 degrees with pain  Internal rotation 90°: WFL    Passive Range of Motion   Left Shoulder   Flexion: 160 degrees   Abduction: 155 degrees   External rotation 90°: 65 degrees   Internal rotation 90°: WFL    Strength/Myotome Testing     Left Shoulder     Planes of Motion   Flexion: 3+   Abduction: 4-   External rotation at 90°: 3+   Internal rotation at 90°: 4     Tests     Left Shoulder   Positive full can and Hawkin's                Precautions: none      Manuals             Laser to B                                                    Neuro Re-Ed             Cane ext with scap sq             Posture tband             Isometrics             Prone Y,T,I                                                    Ther Ex             UBE (back)             Wall slides (flex) 10x10"            Scap retraction 5"x15            Doorway stretch             Eccentric biceps curl             Tband ER             Supine cane AAROM (flex)             Supine serratus punch             Supine flexion Ther Activity

## 2022-03-02 ENCOUNTER — OFFICE VISIT (OUTPATIENT)
Dept: PHYSICAL THERAPY | Facility: CLINIC | Age: 67
End: 2022-03-02
Payer: MEDICARE

## 2022-03-02 DIAGNOSIS — M75.82 ROTATOR CUFF TENDONITIS, LEFT: ICD-10-CM

## 2022-03-02 DIAGNOSIS — M19.012 ARTHRITIS OF LEFT ACROMIOCLAVICULAR JOINT: Primary | ICD-10-CM

## 2022-03-02 PROCEDURE — 97110 THERAPEUTIC EXERCISES: CPT | Performed by: PHYSICAL THERAPIST

## 2022-03-02 PROCEDURE — 97112 NEUROMUSCULAR REEDUCATION: CPT | Performed by: PHYSICAL THERAPIST

## 2022-03-02 NOTE — PROGRESS NOTES
Daily Note     Today's date: 3/2/2022  Patient name: Antonio Steve  : 1955  MRN: [id number]  Referring provider: Delmi Ray DO  Dx:   Encounter Diagnosis     ICD-10-CM    1  Arthritis of left acromioclavicular joint  M19 012    2  Rotator cuff tendonitis, left  M75 82                   Subjective: Pt reports soreness after initial evaluation  Objective: See treatment diary below      Assessment: Pt had good tolerance to initiation of program  Reported fatigue and soreness post session  Plan: Continue per plan of care        Precautions: none      Manuals 2/28 3/2           Laser to B  4'                                                  Neuro Re-Ed             Cane ext with scap sq  10x10"           Posture tband             Isometrics  Flex/ER/IR 10x10" ea           Prone Y,T,I                                                    Ther Ex             UBE (back)  6'           Wall slides (flex) 10x10"            Scap retraction 5"x15 review           Doorway stretch             Eccentric biceps curl             Tband ER             Supine cane AAROM (flex)  10x10"           Supine serratus punch  x15           Supine flexion  x15                                                  Ther Activity

## 2022-03-06 DIAGNOSIS — E78.2 MIXED HYPERLIPIDEMIA: ICD-10-CM

## 2022-03-07 ENCOUNTER — APPOINTMENT (OUTPATIENT)
Dept: PHYSICAL THERAPY | Facility: CLINIC | Age: 67
End: 2022-03-07
Payer: MEDICARE

## 2022-03-07 RX ORDER — ATORVASTATIN CALCIUM 40 MG/1
TABLET, FILM COATED ORAL
Qty: 54 TABLET | Refills: 1 | Status: SHIPPED | OUTPATIENT
Start: 2022-03-07

## 2022-03-09 ENCOUNTER — OFFICE VISIT (OUTPATIENT)
Dept: PHYSICAL THERAPY | Facility: CLINIC | Age: 67
End: 2022-03-09
Payer: MEDICARE

## 2022-03-09 DIAGNOSIS — M19.012 ARTHRITIS OF LEFT ACROMIOCLAVICULAR JOINT: Primary | ICD-10-CM

## 2022-03-09 DIAGNOSIS — M75.82 ROTATOR CUFF TENDONITIS, LEFT: ICD-10-CM

## 2022-03-09 PROCEDURE — 97112 NEUROMUSCULAR REEDUCATION: CPT

## 2022-03-09 PROCEDURE — 97110 THERAPEUTIC EXERCISES: CPT

## 2022-03-09 NOTE — PROGRESS NOTES
Daily Note     Today's date: 3/9/2022  Patient name: Sudhakar Guerrero  : 1955  MRN: [id number]  Referring provider: Alissa Rocha DO  Dx:   Encounter Diagnosis     ICD-10-CM    1  Arthritis of left acromioclavicular joint  M19 012    2  Rotator cuff tendonitis, left  M75 82        Start Time: 1530  Stop Time: 1615  Total time in clinic (min): 45 minutes       Subjective: Patient reports she felt good after the last PT treatment  Patient reports aggravating left shoulder pain when lifting groceries  Objective: See treatment diary below  Assessment: Left shoulder is uncomfortable when performing flexion wall slides  No issues with addition of eccentric bicep curls  Plan: Continue treatment as per PT plan of care         Precautions: none      Manuals 2/28 3/2 3/9          Laser to LHB  4' 4'                                                 Neuro Re-Ed             Cane ext with scap sq  10x10" 10"x10          Posture tband             Isometrics  Flex/ER/IR 10x10" ea Flex/  ER/IR 10x10" ea          Prone Y,T,I                                                    Ther Ex             UBE (back)  6' 6'          Wall slides (flex) 10x10"  10"x10          Scap retraction 5"x15 review           Doorway stretch             Eccentric biceps curl   3#  15          Tband ER             Supine cane AAROM (flex)  10x10" 10"x10          Supine serratus punch  x15 15          Supine flexion  x15 15                                                 Ther Activity

## 2022-03-11 DIAGNOSIS — M51.36 DDD (DEGENERATIVE DISC DISEASE), LUMBAR: ICD-10-CM

## 2022-03-12 DIAGNOSIS — F41.1 GAD (GENERALIZED ANXIETY DISORDER): ICD-10-CM

## 2022-03-12 RX ORDER — HYDROCODONE BITARTRATE AND ACETAMINOPHEN 5; 325 MG/1; MG/1
TABLET ORAL
Qty: 30 TABLET | Refills: 0 | Status: SHIPPED | OUTPATIENT
Start: 2022-03-12 | End: 2022-04-04 | Stop reason: SDUPTHER

## 2022-03-12 RX ORDER — SERTRALINE HYDROCHLORIDE 100 MG/1
TABLET, FILM COATED ORAL
Qty: 180 TABLET | Refills: 3 | Status: SHIPPED | OUTPATIENT
Start: 2022-03-12

## 2022-03-15 ENCOUNTER — OFFICE VISIT (OUTPATIENT)
Dept: PHYSICAL THERAPY | Facility: CLINIC | Age: 67
End: 2022-03-15
Payer: MEDICARE

## 2022-03-15 DIAGNOSIS — M75.82 ROTATOR CUFF TENDONITIS, LEFT: ICD-10-CM

## 2022-03-15 DIAGNOSIS — M19.012 ARTHRITIS OF LEFT ACROMIOCLAVICULAR JOINT: Primary | ICD-10-CM

## 2022-03-15 PROCEDURE — 97110 THERAPEUTIC EXERCISES: CPT

## 2022-03-17 ENCOUNTER — OFFICE VISIT (OUTPATIENT)
Dept: OBGYN CLINIC | Facility: MEDICAL CENTER | Age: 67
End: 2022-03-17
Payer: MEDICARE

## 2022-03-17 ENCOUNTER — APPOINTMENT (OUTPATIENT)
Dept: PHYSICAL THERAPY | Facility: CLINIC | Age: 67
End: 2022-03-17
Payer: MEDICARE

## 2022-03-17 VITALS
HEIGHT: 60 IN | DIASTOLIC BLOOD PRESSURE: 76 MMHG | WEIGHT: 154 LBS | SYSTOLIC BLOOD PRESSURE: 127 MMHG | BODY MASS INDEX: 30.23 KG/M2

## 2022-03-17 DIAGNOSIS — M19.012 ARTHRITIS OF LEFT ACROMIOCLAVICULAR JOINT: Primary | ICD-10-CM

## 2022-03-17 DIAGNOSIS — M75.82 ROTATOR CUFF TENDONITIS, LEFT: ICD-10-CM

## 2022-03-17 DIAGNOSIS — M75.42 IMPINGEMENT SYNDROME OF LEFT SHOULDER: ICD-10-CM

## 2022-03-17 PROCEDURE — 99213 OFFICE O/P EST LOW 20 MIN: CPT | Performed by: ORTHOPAEDIC SURGERY

## 2022-03-17 NOTE — PROGRESS NOTES
Ortho Sports Medicine Shoulder Follow Up Visit     Assesment:   78 y o  female left shoulder AC joint arthritis, with improvement     Plan:    Conservative treatment:    Ice to shoulder 1-2 times daily, for 20 minutes at a time  PT for ROM and strengthening to shoulder, rotator cuff, scapular stabilizers  Let pain guide return to activities  Imaging:    No imaging was available for review today  Brad Busby reminded me that she is unable to get an MRI in the future if needed since she has metal implants from a previous procedure, so a CT scan with contrast may be considered if necessary  Injection:    No Injection planned at this time  May consider future corticosteroid injection depending on clinical exam/imaging  Surgery:     No surgery is recommended at this point, continue with conservative treatment plan as noted  Follow up:    Return if symptoms worsen or fail to improve, for Recheck  Chief Complaint   Patient presents with    Left Shoulder - Follow-up         History of Present Illness: The patient is returns for follow up of left shoulder pain  Since the prior visit, She reports significant improvement  She notes that the last injection received on 12/23/2021 provided long term relief, and is starting to wear off at this point  Pain is improved by rest, ice and NSAIDS  Pain is aggravated by lifting and lying on her left side  Symptoms include clicking and popping  She does report occasional muscle spasms when she drives  The patient denies weakness  The patient has tried rest, ice, NSAIDs, injections, muscle relaxers, and physical therapy, which she has experienced improvements with as well          Shoulder Surgical History:  None    Past Medical, Social and Family History:  Past Medical History:   Diagnosis Date    Anemia     Iron and B-12 anemia    Anxiety and depression     Bleeding ulcer     Disease of thyroid gland     GERD (gastroesophageal reflux disease)     Groin pain, right 2/3/2021    Scoliosis     Spinal meningioma [facility])      Past Surgical History:   Procedure Laterality Date    SPINAL CORD STIMULATOR IMPLANT      SPINE SURGERY  1996     [facility]     Allergies   Allergen Reactions    Cephalosporins Other (See Comments)     GI Upset (vomit/diarrhea)    Medical Tape Rash    Metformin      Other reaction(s): Nausea and/or vomiting    Nabumetone GI Intolerance    Other GI Intolerance    Anesthesia S-I-40 [Propofol]     Celecoxib     Cephalexin     Choline Magnesium Trisalicylate     Diclofenac     Diclofenac Potassium(Migraine) GI Intolerance    Diclofenac Sodium GI Intolerance    Fentanyl     Propoxyphene Other (See Comments)     unknown    Sulfa Antibiotics     Sulfacetamide     Zolpidem      Current Outpatient Medications on File Prior to Visit   Medication Sig Dispense Refill    amLODIPine (NORVASC) 5 mg tablet TAKE 1 TABLET BY MOUTH EVERY DAY 90 tablet 1    atorvastatin (LIPITOR) 40 mg tablet TAKE 1 TABLET BY MOUTH 4 DAYS PER WEEK 54 tablet 1    Calcium Carbonate-Vitamin D (CALCIUM CREAMIES PO) Take 600 mg by mouth daily      Cholecalciferol ([employer] VITAMIN D3) 1000 units capsule Take 2,000 Units by mouth      clonazePAM (KlonoPIN) 1 mg tablet Take half a tablet (0 5 mg) twice a day as needed for anxiety 30 tablet 5    Cyanocobalamin 1000 MCG/ML LIQD cyanocobalamin (vit B-12) 1,000 mcg/mL injection solution      dicyclomine (BENTYL) 20 mg tablet TAKE 1 TABLET BY MOUTH 2 TIMES A DAY AS NEEDED (ABDOMINAL BLOATING/PAIN) 30 tablet 5    ferrous sulfate 325 (65 Fe) mg tablet Take by oral route        fluticasone (FLONASE) 50 mcg/act nasal spray USE 2 SPRAYS IN EACH NOSTRIL EVERY DAY 3 Bottle 3    HYDROcodone-acetaminophen (NORCO) 5-325 mg per tablet Take 1 tablet 2 times a day as needed for severe low back pain 30 tablet 0    levothyroxine 125 mcg tablet TAKE 1 TABLET BY MOUTH EVERY DAY 90 tablet 1    lidocaine (LIDODERM) 5 % Apply 1 or 2 patches to affected areas, remove in 12 hours 60 patch 3    metaxalone (SKELAXIN) 800 mg tablet TAKE 1 TABLET BY MOUTH TWICE A DAY AS NEEDED FOR MUSCLE SPASMS 60 tablet 4    methylPREDNISolone 4 MG tablet therapy pack Use as directed on package 21 each 0    naloxone (NARCAN) 4 mg/0 1 mL nasal spray Administer 1 spray into a nostril  If breathing does not return to normal or if breathing difficulty resumes after 2-3 minutes, give another dose in the other nostril using a new spray   1 each 1    nebivolol (Bystolic) 10 mg tablet Take 1 tablet (10 mg total) by mouth daily 90 tablet 3    omega-3-acid ethyl esters (LOVAZA) 1 g capsule Take 2 g by mouth daily        omeprazole (PriLOSEC) 20 mg delayed release capsule omeprazole 20 mg capsule,delayed release      pantoprazole (PROTONIX) 40 mg tablet TAKE 1 TABLET BY MOUTH DAILY AS NEEDED 30 tablet 6    sertraline (ZOLOFT) 100 mg tablet TAKE 2 TABLETS BY MOUTH EVERY  tablet 3    tiZANidine (ZANAFLEX) 4 mg tablet TAKE 2 TABLETS BY MOUTH AT BEDTIME AS NEEDED FOR MUSCLE SPASMS 180 tablet 1     Current Facility-Administered Medications on File Prior to Visit   Medication Dose Route Frequency Provider Last Rate Last Admin    cyanocobalamin injection 1,000 mcg  1,000 mcg Intramuscular Q30 Days Shea Browning MD   1,000 mcg at 02/21/18 1534    cyanocobalamin injection 1,000 mcg  1,000 mcg Intramuscular Q30 Days Shea Browning MD   1,000 mcg at 03/29/18 0717    cyanocobalamin injection 1,000 mcg  1,000 mcg Intramuscular Q30 Days Shea Browning MD   1,000 mcg at 04/30/18 1737    cyanocobalamin injection 1,000 mcg  1,000 mcg Intramuscular D13 Days Arjun Bella MD   9,158 mcg at 08/10/18 1017    cyanocobalamin injection 1,000 mcg  1,000 mcg Intramuscular Q30 Days Shea Browning MD   1,000 mcg at 09/10/18 1127    cyanocobalamin injection 1,000 mcg  1,000 mcg Intramuscular Q30 Days Shea Browning MD 1,000 mcg at 02/11/19 7114    cyanocobalamin injection 1,000 mcg  1,000 mcg Intramuscular Q30 Days Kwesi Ho MD   1,000 mcg at 09/23/19 1739    cyanocobalamin injection 1,000 mcg  1,000 mcg Intramuscular Q30 Days Kwesi Ho MD   1,000 mcg at 10/29/19 2010    cyanocobalamin injection 1,000 mcg  1,000 mcg Intramuscular Q30 Days Katt Lindsay MD   1,000 mcg at 12/06/19 1513    cyanocobalamin injection 1,000 mcg  1,000 mcg Intramuscular Q30 Days Kwesi Ho MD   1,000 mcg at 07/02/20 1412    cyanocobalamin injection 1,000 mcg  1,000 mcg Intramuscular Q30 Days Kwesi Ho MD   1,000 mcg at 06/01/21 1100     Social History     Socioeconomic History    Marital status:      Spouse name: Not on file    Number of children: Not on file    Years of education: Not on file    Highest education level: Not on file   Occupational History    Not on file   Tobacco Use    Smoking status: Never Smoker    Smokeless tobacco: Never Used   Vaping Use    Vaping Use: Never used   Substance and Sexual Activity    Alcohol use: Yes     Comment: rare    Drug use: No    Sexual activity: Not Currently     Partners: Male   Other Topics Concern    Not on file   Social History Narrative        Retired    2 adult sons    Grandchildren     Social Determinants of Health     Financial Resource Strain: Not on file   Food Insecurity: Not on file   Transportation Needs: Not on file   Physical Activity: Not on file   Stress: Not on file   Social Connections: Not on file   Intimate Partner Violence: Not on file   Housing Stability: Not on file       I have reviewed the past medical, surgical, social and family history, medications and allergies as documented in the EMR  Review of systems: ROS is negative other than that noted in the HPI  Constitutional: Negative for fatigue and fever        Physical Exam:    Blood pressure 127/76, height 5' (1 524 m), weight 69 9 kg (154 lb), not currently breastfeeding      General/Constitutional: NAD, well developed, well nourished  HENT: Normocephalic, atraumatic  CV: Intact distal pulses, regular rate  Resp: No respiratory distress or labored breathing  Lymphatic: No lymphadenopathy palpated  Neuro: Alert and Oriented x 3, no focal deficits  Psych: Normal mood, normal affect, normal judgement, normal behavior  Skin: Warm, dry, no rashes, no erythema      Shoulder focused exam:       RIGHT LEFT    Scapula Atrophy Negative Negative     Winging Negative Negative     Protraction Negative Negative    Rotator cuff SS 5/5 5/5     IS 5/5 5/5     SubS 5/5 5/5    ROM     170     ER0 60 60     ER90 90    90     IR90 T6    T6     IRb T6    T6    TTP: AC Negative Negative     Biceps Negative Negative     Coracoid Negative Negative    Special Tests: O'Briens Negative Negative     Vázquez-shear Negative Negative     Cross body Adduction Negative Negative     Speeds  Negative Negative     Barry's Negative Negative     Whipple Negative Negative       Neer Negative Negative     Christopher Negative Negative    Instability: Apprehension & relocation not tested not tested     Load & shift not tested not tested    Other: Crank Negative Negative               UE NV Exam: +2 Radial pulses bilaterally  Sensation intact to light touch C5-T1 bilaterally, Radial/median/ulnar nerve motor intact    Cervical ROM is full without pain, numbness or tingling      Shoulder Imaging    No imaging was performed today      Scribe Attestation    I,:  Indio Lemos am acting as a scribe while in the presence of the attending physician :       I,:  Adalberto Ty DO personally performed the services described in this documentation    as scribed in my presence :

## 2022-03-18 ENCOUNTER — CLINICAL SUPPORT (OUTPATIENT)
Dept: FAMILY MEDICINE CLINIC | Facility: CLINIC | Age: 67
End: 2022-03-18
Payer: MEDICARE

## 2022-03-18 VITALS — TEMPERATURE: 97.5 F

## 2022-03-18 DIAGNOSIS — E53.8 B12 DEFICIENCY: Primary | ICD-10-CM

## 2022-03-18 PROCEDURE — 96372 THER/PROPH/DIAG INJ SC/IM: CPT | Performed by: FAMILY MEDICINE

## 2022-03-18 RX ORDER — CYANOCOBALAMIN 1000 UG/ML
1000 INJECTION INTRAMUSCULAR; SUBCUTANEOUS ONCE
Status: COMPLETED | OUTPATIENT
Start: 2022-03-18 | End: 2022-03-18

## 2022-03-18 RX ADMIN — CYANOCOBALAMIN 1000 MCG: 1000 INJECTION INTRAMUSCULAR; SUBCUTANEOUS at 14:00

## 2022-03-21 ENCOUNTER — OFFICE VISIT (OUTPATIENT)
Dept: PHYSICAL THERAPY | Facility: CLINIC | Age: 67
End: 2022-03-21
Payer: MEDICARE

## 2022-03-21 DIAGNOSIS — M75.82 ROTATOR CUFF TENDONITIS, LEFT: ICD-10-CM

## 2022-03-21 DIAGNOSIS — M19.012 ARTHRITIS OF LEFT ACROMIOCLAVICULAR JOINT: Primary | ICD-10-CM

## 2022-03-21 PROCEDURE — 97110 THERAPEUTIC EXERCISES: CPT

## 2022-03-21 NOTE — PROGRESS NOTES
Daily Note     Today's date: 3/21/2022  Patient name: Araceli Huertas  : 1955  MRN: [id number]  Referring provider: Moustapha Vu DO  Dx:   Encounter Diagnosis     ICD-10-CM    1  Arthritis of left acromioclavicular joint  M19 012    2  Rotator cuff tendonitis, left  M75 82                   Subjective: Pt offers no new complaints at this time  Objective: See treatment diary below      Assessment: Pt did well with all TE as listed, reports no increased pain during or post tx  Plan: Continue per plan of care      1:1 30 min     Precautions: none      Manuals 2/28 3/2 3/9 3/15 3/21        LEFT Laser to LHB  4' 4' 4' 4'                                                Neuro Re-Ed             Cane ext with scap sq  10x10" 10"x10 10"x10 10"x10        Posture tband             Isometrics  Flex/ER/IR 10x10" ea Flex/  ER/IR 10x10" ea Flex/ER/IR  10x10" EA Flex/ER 10'x10ea        Prone Y,T,I                                                    Ther Ex             UBE (back)  6' 6' 6' 6'        Wall slides (flex) 10x10"  10"x10 10"x10         Scap retraction 5"x15 review           Doorway stretch             Eccentric biceps curl   3#  15 3#X15 3# x20        Tband ER             Supine cane AAROM (flex)  10x10" 10"x10 10"x10 10"x10        Supine serratus punch  x15 15 x15 x20        Supine flexion  x15 15  x20                                               Ther Activity

## 2022-03-23 ENCOUNTER — OFFICE VISIT (OUTPATIENT)
Dept: PHYSICAL THERAPY | Facility: CLINIC | Age: 67
End: 2022-03-23
Payer: MEDICARE

## 2022-03-23 DIAGNOSIS — M19.012 ARTHRITIS OF LEFT ACROMIOCLAVICULAR JOINT: Primary | ICD-10-CM

## 2022-03-23 DIAGNOSIS — M75.82 ROTATOR CUFF TENDONITIS, LEFT: ICD-10-CM

## 2022-03-23 PROCEDURE — 97110 THERAPEUTIC EXERCISES: CPT

## 2022-03-23 PROCEDURE — 97112 NEUROMUSCULAR REEDUCATION: CPT

## 2022-03-23 NOTE — PROGRESS NOTES
Daily Note     Today's date: 3/23/2022  Patient name: Chaparro Aguilar  : 1955  MRN: [id number]  Referring provider: Nataliia Cruz DO  Dx:   Encounter Diagnosis     ICD-10-CM    1  Arthritis of left acromioclavicular joint  M19 012    2  Rotator cuff tendonitis, left  M75 82        Start Time: 1530  Stop Time: 1612  Total time in clinic (min): 42 minutes       Subjective: Patient states, "Today it's good "      Objective: See treatment diary below  Assessment: Therapeutic exercise program is tolerated well without complaints  Patient demonstrates good shoulder flexion ROM when performing supine cane exercise  Plan: Continue treatment as per PT plan of care         Precautions: none      Manuals 2/28 3/2 3/9 3/15 3/21 3/23       LEFT Laser to LHB  4' 4' 4' 4'  4'                                              Neuro Re-Ed             Cane ext with scap sq  10x10" 10"x10 10"x10 10"x10 10"x10       Posture tband             Isometrics  Flex/ER/IR 10x10" ea Flex/  ER/IR 10x10" ea Flex/ER/IR  10x10" EA Flex/ER 10'x10ea Flex/  ER/IR 10x10" ea       Prone Y,T,I                                                    Ther Ex             UBE (back)  6' 6' 6' 6' 6'       Wall slides (flex) 10x10"  10"x10 10"x10  10"x10       Scap retraction 5"x15 review           Doorway stretch             Eccentric biceps curl   3#  15 3#X15 3# x20 3#  20       Tband ER             Supine cane AAROM (flex)  10x10" 10"x10 10"x10 10"x10 10"x10       Supine serratus punch  x15 15 x15 x20 1# x20       Supine flexion  x15 15  x20 1# x20                                              Ther Activity

## 2022-03-29 ENCOUNTER — OFFICE VISIT (OUTPATIENT)
Dept: PHYSICAL THERAPY | Facility: CLINIC | Age: 67
End: 2022-03-29
Payer: MEDICARE

## 2022-03-29 DIAGNOSIS — M19.012 ARTHRITIS OF LEFT ACROMIOCLAVICULAR JOINT: Primary | ICD-10-CM

## 2022-03-29 DIAGNOSIS — M75.82 ROTATOR CUFF TENDONITIS, LEFT: ICD-10-CM

## 2022-03-29 PROCEDURE — 97110 THERAPEUTIC EXERCISES: CPT

## 2022-03-29 PROCEDURE — 97112 NEUROMUSCULAR REEDUCATION: CPT

## 2022-03-29 NOTE — PROGRESS NOTES
Daily Note     Today's date: 3/29/2022  Patient name: Mehrdad Mojica  : 1955  MRN: [id number]  Referring provider: Carrington Jacobs DO  Dx:   Encounter Diagnosis     ICD-10-CM    1  Arthritis of left acromioclavicular joint  M19 012    2  Rotator cuff tendonitis, left  M75 82                      Subjective: Patient reports her shoulder is a little sore today, poss due to cold weather and pulling rocks from her front yard  Objective: See treatment diary below  Assessment: Pt demonstrates good tolerance to treatment and is appropriately challenged with current TE  Pt with good form during exercise completion requiring min cuing t/o  Pt would benefit from continued PT to improve current deficits and maximize function  Plan: Continue treatment as per PT plan of care         Precautions: none      Manuals 2/28 3/2 3/9 3/15 3/21 3/23 3/29      LEFT Laser to LHB  4' 4' 4' 4'  4' 4'                                             Neuro Re-Ed             Cane ext with scap sq  10x10" 10"x10 10"x10 10"x10 10"x10 10"x10      Posture tband             Isometrics  Flex/ER/IR 10x10" ea Flex/  ER/IR 10x10" ea Flex/ER/IR  10x10" EA Flex/ER 10'x10ea Flex/  ER/IR 10x10" ea Flex/IR/ER  10x10" ea      Prone Y,T,I                                                    Ther Ex             UBE (back)  6' 6' 6' 6' 6' 6'      Wall slides (flex) 10x10"  10"x10 10"x10  10"x10 10"x10      Scap retraction 5"x15 review           Doorway stretch             Eccentric biceps curl   3#  15 3#X15 3# x20 3#  20 3#  20      Tband ER             Supine cane AAROM (flex)  10x10" 10"x10 10"x10 10"x10 10"x10 10"x10      Supine serratus punch  x15 15 x15 x20 1# x20 1# 20      Supine flexion  x15 15  x20 1# x20 1# 20                                             Ther Activity

## 2022-03-30 ENCOUNTER — RA CDI HCC (OUTPATIENT)
Dept: OTHER | Facility: HOSPITAL | Age: 67
End: 2022-03-30

## 2022-03-30 NOTE — PROGRESS NOTES
[address]  coding opportunities       Chart reviewed, no opportunity found: CHART REVIEWED, NO OPPORTUNITY FOUND        Patients Insurance     Medicare Insurance: Medicare

## 2022-03-31 ENCOUNTER — APPOINTMENT (OUTPATIENT)
Dept: PHYSICAL THERAPY | Facility: CLINIC | Age: 67
End: 2022-03-31
Payer: MEDICARE

## 2022-04-04 DIAGNOSIS — M51.36 DDD (DEGENERATIVE DISC DISEASE), LUMBAR: ICD-10-CM

## 2022-04-04 DIAGNOSIS — J30.9 ALLERGIC RHINITIS: ICD-10-CM

## 2022-04-04 RX ORDER — HYDROCODONE BITARTRATE AND ACETAMINOPHEN 5; 325 MG/1; MG/1
TABLET ORAL
Qty: 30 TABLET | Refills: 0 | Status: SHIPPED | OUTPATIENT
Start: 2022-04-04 | End: 2022-06-21 | Stop reason: SDUPTHER

## 2022-04-05 ENCOUNTER — APPOINTMENT (OUTPATIENT)
Dept: PHYSICAL THERAPY | Facility: CLINIC | Age: 67
End: 2022-04-05
Payer: MEDICARE

## 2022-04-05 ENCOUNTER — OFFICE VISIT (OUTPATIENT)
Dept: FAMILY MEDICINE CLINIC | Facility: CLINIC | Age: 67
End: 2022-04-05
Payer: MEDICARE

## 2022-04-05 VITALS
HEIGHT: 60 IN | OXYGEN SATURATION: 97 % | BODY MASS INDEX: 30.43 KG/M2 | TEMPERATURE: 98.4 F | HEART RATE: 73 BPM | SYSTOLIC BLOOD PRESSURE: 122 MMHG | RESPIRATION RATE: 16 BRPM | WEIGHT: 155 LBS | DIASTOLIC BLOOD PRESSURE: 64 MMHG

## 2022-04-05 DIAGNOSIS — F11.20 CONTINUOUS OPIOID DEPENDENCE (HCC): ICD-10-CM

## 2022-04-05 DIAGNOSIS — G89.29 CHRONIC LEFT HIP PAIN: ICD-10-CM

## 2022-04-05 DIAGNOSIS — M51.36 DDD (DEGENERATIVE DISC DISEASE), LUMBAR: ICD-10-CM

## 2022-04-05 DIAGNOSIS — I10 BENIGN ESSENTIAL HYPERTENSION: ICD-10-CM

## 2022-04-05 DIAGNOSIS — R39.89 URINE DISCOLORATION: ICD-10-CM

## 2022-04-05 DIAGNOSIS — E78.2 MIXED HYPERLIPIDEMIA: Primary | ICD-10-CM

## 2022-04-05 DIAGNOSIS — I47.1 PSVT (PAROXYSMAL SUPRAVENTRICULAR TACHYCARDIA) (HCC): ICD-10-CM

## 2022-04-05 DIAGNOSIS — M51.9 DISC DISORDER OF LUMBAR REGION: ICD-10-CM

## 2022-04-05 DIAGNOSIS — M25.552 CHRONIC LEFT HIP PAIN: ICD-10-CM

## 2022-04-05 DIAGNOSIS — F41.8 DEPRESSION WITH ANXIETY: Chronic | ICD-10-CM

## 2022-04-05 DIAGNOSIS — F41.1 GAD (GENERALIZED ANXIETY DISORDER): ICD-10-CM

## 2022-04-05 DIAGNOSIS — E03.9 HYPOTHYROIDISM, UNSPECIFIED TYPE: ICD-10-CM

## 2022-04-05 LAB
SL AMB  POCT GLUCOSE, UA: NORMAL
SL AMB LEUKOCYTE ESTERASE,UA: NORMAL
SL AMB POCT BILIRUBIN,UA: NORMAL
SL AMB POCT BLOOD,UA: NORMAL
SL AMB POCT CLARITY,UA: CLEAR
SL AMB POCT COLOR,UA: NORMAL
SL AMB POCT KETONES,UA: NORMAL
SL AMB POCT NITRITE,UA: NORMAL
SL AMB POCT PH,UA: 5
SL AMB POCT SPECIFIC GRAVITY,UA: 1.02
SL AMB POCT URINE PROTEIN: NORMAL
SL AMB POCT UROBILINOGEN: 0.2

## 2022-04-05 PROCEDURE — 99214 OFFICE O/P EST MOD 30 MIN: CPT | Performed by: FAMILY MEDICINE

## 2022-04-05 PROCEDURE — 81003 URINALYSIS AUTO W/O SCOPE: CPT | Performed by: FAMILY MEDICINE

## 2022-04-05 RX ORDER — METAXALONE 800 MG/1
800 TABLET ORAL 2 TIMES DAILY PRN
Qty: 60 TABLET | Refills: 2 | Status: SHIPPED | OUTPATIENT
Start: 2022-04-05

## 2022-04-05 NOTE — PROGRESS NOTES
FAMILY PRACTICE OFFICE VISIT       NAME: Eugenia Ward  AGE: 75 y o  SEX: female       : 1955        MRN: [id number]        Assessment and Plan     1  Mixed hyperlipidemia  Assessment & Plan:  Atorvastatin 40 mg daily  X 6 days per week, will change to QOD for now  Patient is concerned about subjective finding of darker urine color  Urinalysis performed in the office today is unremarkable  Pending labs in late April  No unusual myalgias or arthralgias  Check CPK with next  blood draw      Orders:  -     CK (with reflex to MB); Future    2  Disc disorder of lumbar region  -     metaxalone (SKELAXIN) 800 mg tablet; Take 1 tablet (800 mg total) by mouth 2 (two) times a day as needed for muscle spasms    3  Continuous opioid dependence ([address] )  Assessment & Plan:  Patient uses hydrocodone sparingly as needed for severe polyarticular arthritic joint pain and multilevel lumbar and cervical spine disc disease and spondylosis  4  PSVT (paroxysmal supraventricular tachycardia) ([address] )    5  Urine discoloration  -     POCT urine dip auto non-scope    6  KERON (generalized anxiety disorder)  Assessment & Plan:  Zoloft  200 mg daily and PRN Klonopin      7  DDD (degenerative disc disease), lumbar    8  Chronic left hip pain  -     Ambulatory referral to Orthopedic Surgery; Future    9  Depression with anxiety  Assessment & Plan:  Patient is doing well  Continue Zoloft and Klonopin  10  Benign essential hypertension  Assessment & Plan:  Blood pressure is well controlled  Continue Bystolic and amlodipine      11  Hypothyroidism, unspecified type  Assessment & Plan:  Levothyroxine 125 mcg daily, pending labs including TSH             There are no Patient Instructions on file for this visit  Return in about 7 months (around 2022) for Medicare wellness, follow up  Discussed with the patient and all questioned fully answered  She will call me if any problems arise       [employer] software was used to dictate this note  It may contain errors with dictating incorrect words/spelling  Please contact provider directly with any questions  Chief Complaint     Chief Complaint   Patient presents with    Follow-up       History of Present Illness     Patient presents for follow-up  Medications updated  Patient remains on daily medications for hypertension, hyperlipidemia, hypothyroidism, depression/anxiety, chronic arthritic pain  She is due for routine blood work and will be proceeding shortly  Patient has been feeling generally stably  No complaints of chest pain, palpitations, shortness of breath or dizziness  She has gained 5 lb of weight over the winter, admits to lack of exercise and chronic arthritic pain  She is under care of [facility] Orthopedic surgery for treatment of left shoulder pain, patient is in physical therapy  She sees cardiologist at [facility] on an annual basis  She canceled scheduled sleep study due to family emergency, will reschedule  Patient is complaining of persistent pain in left hip  Patient reports darker urine color and is worried about possible side effects of Lipitor  She denies symptoms of myalgias per se  No symptoms of urinary frequency, urgency, flank pain or gross hematuria  Review of Systems   Review of Systems   Constitutional: Positive for fatigue (Chronic)  HENT: Negative  Eyes: Negative  Respiratory: Negative  Cardiovascular: Negative  Gastrointestinal: Negative  Endocrine: Negative  Genitourinary: Negative  Negative for dysuria, flank pain and hematuria  Musculoskeletal: Positive for arthralgias, back pain and myalgias  Skin: Negative  Allergic/Immunologic: Negative  Neurological: Negative  Hematological: Negative  Psychiatric/Behavioral: The patient is nervous/anxious          Active Problem List     Patient Active Problem List   Diagnosis    Benign essential hypertension    Hypothyroidism    Lumbosacral radiculitis    Cervical stenosis of spinal canal    Disc disorder of lumbar region    Gastroesophageal reflux disease without esophagitis    Seasonal allergic rhinitis due to pollen    PSVT (paroxysmal supraventricular tachycardia) (HCC)    Iron deficiency anemia secondary to inadequate dietary iron intake    Irritable bowel syndrome with diarrhea    Depression with anxiety    Mixed hyperlipidemia    Scoliosis (and kyphoscoliosis), idiopathic    Vitamin B 12 deficiency    KERON (generalized anxiety disorder)    Calcification of left breast on mammography    Personal history of COVID-19    Obstructive sleep apnea syndrome    Continuous opioid dependence ([address] )    Impingement syndrome of left shoulder       Past Medical History:  Past Medical History:   Diagnosis Date    Anemia     Iron and B-12 anemia    Anxiety and depression     Bleeding ulcer     Disease of thyroid gland     GERD (gastroesophageal reflux disease)     Groin pain, right 2/3/2021    Scoliosis     Spinal meningioma ([address] )        Past Surgical History:  Past Surgical History:   Procedure Laterality Date    SPINAL CORD STIMULATOR IMPLANT      SPINE SURGERY  1996     [facility]       Family History:  Family History   Problem Relation Age of Onset    Heart disease Mother     Skin cancer Mother         unsure of type    Esophageal cancer Father     Kidney cancer Father     Thyroid disease Father     Diabetes Son     Skin cancer Sister         unsure of type    Cervical cancer Maternal Grandmother     No Known Problems Maternal Grandfather     No Known Problems Paternal Grandmother     No Known Problems Paternal Grandfather     No Known Problems Sister     Skin cancer Maternal Aunt         unsure of type    No Known Problems Paternal Aunt     No Known Problems Paternal Aunt        Social History:  Social History     Socioeconomic History    Marital status:   Spouse name: Not on file    Number of children: Not on file    Years of education: Not on file    Highest education level: Not on file   Occupational History    Not on file   Tobacco Use    Smoking status: Never Smoker    Smokeless tobacco: Never Used   Vaping Use    Vaping Use: Never used   Substance and Sexual Activity    Alcohol use: Yes     Comment: rare    Drug use: No    Sexual activity: Not Currently     Partners: Male   Other Topics Concern    Not on file   Social History Narrative        Retired    2 adult sons    Grandchildren     Social Determinants of Health     Financial Resource Strain: Not on file   Food Insecurity: Not on file   Transportation Needs: Not on file   Physical Activity: Not on file   Stress: Not on file   Social Connections: Not on file   Intimate Partner Violence: Not on file   Housing Stability: Not on file         Objective     Vitals:    04/05/22 1054   BP: 122/64   BP Location: Left arm   Patient Position: Sitting   Cuff Size: Standard   Pulse: 73   Resp: 16   Temp: 98 4 °F (36 9 °C)   TempSrc: Temporal   SpO2: 97%   Weight: 70 3 kg (155 lb)   Height: 5' (1 524 m)       Wt Readings from Last 3 Encounters:   04/05/22 70 3 kg (155 lb)   03/17/22 69 9 kg (154 lb)   12/23/21 67 1 kg (148 lb)       Physical Exam  Vitals and nursing note reviewed  Constitutional:       General: She is not in acute distress  Appearance: Normal appearance  She is well-developed  She is not ill-appearing  HENT:      Head: Normocephalic and atraumatic  Eyes:      Conjunctiva/sclera: Conjunctivae normal    Neck:      Thyroid: No thyromegaly  Vascular: No carotid bruit  Cardiovascular:      Rate and Rhythm: Normal rate and regular rhythm  Heart sounds: Normal heart sounds  No murmur heard  Pulmonary:      Effort: Pulmonary effort is normal  No respiratory distress  Breath sounds: Normal breath sounds  No wheezing     Abdominal:      General: There is no abdominal bruit    Musculoskeletal:         General: Deformity (Chronic arthritic deformity, spine kyphosis,) present  Normal range of motion  Cervical back: Neck supple  Neurological:      Mental Status: She is alert and oriented to person, place, and time  Cranial Nerves: No cranial nerve deficit        Coordination: Coordination normal    Psychiatric:         Behavior: Behavior normal        Results for orders placed or performed in visit on 04/05/22   POCT urine dip auto non-scope   Result Value Ref Range     COLOR,UA dk yellow     CLARITY,UA clear     SPECIFIC GRAVITY,UA 1 025      PH,UA 5 0     LEUKOCYTE ESTERASE,UA -     NITRITE,UA -     GLUCOSE, UA -     KETONES,UA -     BILIRUBIN,UA -     BLOOD,UA -     POCT URINE PROTEIN -     SL AMB POCT UROBILINOGEN 0 2           Pertinent Laboratory/Diagnostic Studies:    Lab Results   Component Value Date    WBC 7 87 11/15/2021    HGB 12 4 11/15/2021    HCT 37 8 11/15/2021    MCV 92 11/15/2021     11/15/2021       No results found for: TSH    No results found for: CHOL  Lab Results   Component Value Date    TRIG 172 (H) 11/15/2021     Lab Results   Component Value Date    HDL 47 11/15/2021     Lab Results   Component Value Date    LDLCALC 91 11/15/2021     No results found for: HGBA1C  Lab Results   Component Value Date    SODIUM 138 11/15/2021    K 4 5 11/15/2021     11/15/2021    CO2 30 11/15/2021    AGAP 6 11/15/2021    BUN 17 11/15/2021    CREATININE 0 61 11/15/2021    GLUC 112 10/25/2020    GLUF 93 11/15/2021    CALCIUM 9 4 11/15/2021    AST 19 11/15/2021    ALT 21 11/15/2021    ALKPHOS 112 11/15/2021    TP 8 4 (H) 11/15/2021    TBILI 0 39 11/15/2021    EGFR 94 11/15/2021       Orders Placed This Encounter   Procedures    CK (with reflex to MB)    Ambulatory referral to Orthopedic Surgery    POCT urine dip auto non-scope       ALLERGIES:  Allergies   Allergen Reactions    Cephalosporins Other (See Comments)     GI Upset (vomit/diarrhea)    Medical Tape Rash    Metformin      Other reaction(s): Nausea and/or vomiting    Nabumetone GI Intolerance    Other GI Intolerance    Anesthesia S-I-40 [Propofol]     Celecoxib     Cephalexin     Choline Magnesium Trisalicylate     Diclofenac     Diclofenac Potassium(Migraine) GI Intolerance    Diclofenac Sodium GI Intolerance    Fentanyl     Propoxyphene Other (See Comments)     unknown    Sulfa Antibiotics     Sulfacetamide     Zolpidem        Current Medications     Current Outpatient Medications   Medication Sig Dispense Refill    amLODIPine (NORVASC) 5 mg tablet TAKE 1 TABLET BY MOUTH EVERY DAY 90 tablet 1    atorvastatin (LIPITOR) 40 mg tablet TAKE 1 TABLET BY MOUTH 4 DAYS PER WEEK 54 tablet 1    Calcium Carbonate-Vitamin D (CALCIUM CREAMIES PO) Take 600 mg by mouth daily      Cholecalciferol ([employer] VITAMIN D3) 1000 units capsule Take 2,000 Units by mouth      clonazePAM (KlonoPIN) 1 mg tablet Take half a tablet (0 5 mg) twice a day as needed for anxiety 30 tablet 5    Cyanocobalamin 1000 MCG/ML LIQD cyanocobalamin (vit B-12) 1,000 mcg/mL injection solution      dicyclomine (BENTYL) 20 mg tablet TAKE 1 TABLET BY MOUTH 2 TIMES A DAY AS NEEDED (ABDOMINAL BLOATING/PAIN) 30 tablet 5    ferrous sulfate 325 (65 Fe) mg tablet Take by oral route        fluticasone (FLONASE) 50 mcg/act nasal spray USE 2 SPRAYS IN EACH NOSTRIL EVERY DAY 3 Bottle 3    HYDROcodone-acetaminophen (NORCO) 5-325 mg per tablet Take 1 tablet 2 times a day as needed for severe low back pain 30 tablet 0    levothyroxine 125 mcg tablet TAKE 1 TABLET BY MOUTH EVERY DAY 90 tablet 1    nebivolol (Bystolic) 10 mg tablet Take 1 tablet (10 mg total) by mouth daily 90 tablet 3    omeprazole (PriLOSEC) 20 mg delayed release capsule omeprazole 20 mg capsule,delayed release      pantoprazole (PROTONIX) 40 mg tablet TAKE 1 TABLET BY MOUTH DAILY AS NEEDED 30 tablet 6    sertraline (ZOLOFT) 100 mg tablet TAKE 2 TABLETS BY MOUTH EVERY  tablet 3    tiZANidine (ZANAFLEX) 4 mg tablet TAKE 2 TABLETS BY MOUTH AT BEDTIME AS NEEDED FOR MUSCLE SPASMS 180 tablet 1    metaxalone (SKELAXIN) 800 mg tablet Take 1 tablet (800 mg total) by mouth 2 (two) times a day as needed for muscle spasms 60 tablet 2     Current Facility-Administered Medications   Medication Dose Route Frequency Provider Last Rate Last Admin    cyanocobalamin injection 1,000 mcg  1,000 mcg Intramuscular Q30 Days Echo Dent MD   1,000 mcg at 02/21/18 1534    cyanocobalamin injection 1,000 mcg  1,000 mcg Intramuscular Q30 Days Echo Dent MD   1,000 mcg at 03/29/18 0717    cyanocobalamin injection 1,000 mcg  1,000 mcg Intramuscular Q30 Days Echo Dent MD   1,000 mcg at 04/30/18 1737    cyanocobalamin injection 1,000 mcg  1,000 mcg Intramuscular M00 Days Maria Guadalupe Lieberman MD   9,763 mcg at 08/10/18 1017    cyanocobalamin injection 1,000 mcg  1,000 mcg Intramuscular Q30 Days Echo Dent MD   1,000 mcg at 09/10/18 1127    cyanocobalamin injection 1,000 mcg  1,000 mcg Intramuscular Q30 Days Echo Dent MD   1,000 mcg at 02/11/19 0958    cyanocobalamin injection 1,000 mcg  1,000 mcg Intramuscular Q30 Days Echo Dent MD   1,000 mcg at 09/23/19 1739    cyanocobalamin injection 1,000 mcg  1,000 mcg Intramuscular Q30 Days Echo Dent MD   1,000 mcg at 10/29/19 2010    cyanocobalamin injection 1,000 mcg  1,000 mcg Intramuscular Q30 Days Veronique See MD   1,000 mcg at 12/06/19 1513    cyanocobalamin injection 1,000 mcg  1,000 mcg Intramuscular Q30 Days Echo Dent MD   1,000 mcg at 07/02/20 1412    cyanocobalamin injection 1,000 mcg  1,000 mcg Intramuscular Q30 Days Echo Dent MD   1,000 mcg at 06/01/21 1100       Medications Discontinued During This Encounter   Medication Reason    lidocaine (LIDODERM) 5 %     methylPREDNISolone 4 MG tablet therapy pack     naloxone (NARCAN) 4 mg/0 1 mL nasal spray     omega-3-acid ethyl esters (LOVAZA) 1 g capsule     metaxalone (SKELAXIN) 800 mg tablet Reorder       Health Maintenance     Health Maintenance   Topic Date Due    DXA SCAN  Never done    Osteoporosis Screening  Never done    Pneumococcal Vaccine: 65+ Years (2 of 2 - PPSV23) 05/09/2020    COVID-19 Vaccine (3 - Booster for Moderna series) 09/28/2021    BMI: Followup Plan  05/15/2022    Influenza Vaccine (1) 06/30/2022 (Originally 9/1/2021)    Medicare Annual Wellness Visit (AWV)  11/15/2022    Fall Risk  02/28/2023    BMI: Adult  04/05/2023    Breast Cancer Screening: Mammogram  05/25/2023    Colorectal Cancer Screening  08/28/2023    Cervical Cancer Screening  06/30/2024    DTaP,Tdap,and Td Vaccines (3 - Td or Tdap) 04/22/2029    Hepatitis C Screening  Completed    HIB Vaccine  Aged Out    Hepatitis B Vaccine  Aged Out    IPV Vaccine  Aged Out    Hepatitis A Vaccine  Aged Out    Meningococcal ACWY Vaccine  Aged Out    HPV Vaccine  Aged Out       Immunization History   Administered Date(s) Administered    COVID-19 MODERNA VACC 0 5 ML IM 03/31/2021, 04/28/2021    INFLUENZA 10/08/2007, 10/24/2008, 10/13/2009, 10/17/2011, 10/13/2012, 10/21/2013, 11/05/2014, 10/02/2015, 10/13/2016, 10/10/2017, 10/10/2017    Influenza, recombinant, quadrivalent,injectable, preservative free 10/10/2018    Pneumococcal Conjugate 13-Valent 05/09/2019    Td (adult), Unspecified 08/12/1993    Tdap 02/17/2008, 04/22/2019    Zoster 04/29/2014       Haim Valle MD

## 2022-04-05 NOTE — ASSESSMENT & PLAN NOTE
Atorvastatin 40 mg daily  X 6 days per week, will change to QOD for now  Patient is concerned about subjective finding of darker urine color  Urinalysis performed in the office today is unremarkable    Pending labs in late April  No unusual myalgias or arthralgias  Check CPK with next  blood draw

## 2022-04-07 ENCOUNTER — APPOINTMENT (OUTPATIENT)
Dept: PHYSICAL THERAPY | Facility: CLINIC | Age: 67
End: 2022-04-07
Payer: MEDICARE

## 2022-04-09 PROBLEM — G89.29 CHRONIC LEFT HIP PAIN: Status: ACTIVE | Noted: 2022-04-09

## 2022-04-09 PROBLEM — M25.552 CHRONIC LEFT HIP PAIN: Status: ACTIVE | Noted: 2022-04-09

## 2022-04-09 NOTE — ASSESSMENT & PLAN NOTE
Patient uses hydrocodone sparingly as needed for severe polyarticular arthritic joint pain and multilevel lumbar and cervical spine disc disease and spondylosis

## 2022-04-12 ENCOUNTER — APPOINTMENT (OUTPATIENT)
Dept: PHYSICAL THERAPY | Facility: CLINIC | Age: 67
End: 2022-04-12
Payer: MEDICARE

## 2022-04-13 ENCOUNTER — OFFICE VISIT (OUTPATIENT)
Dept: PHYSICAL THERAPY | Facility: CLINIC | Age: 67
End: 2022-04-13
Payer: MEDICARE

## 2022-04-13 DIAGNOSIS — M19.012 ARTHRITIS OF LEFT ACROMIOCLAVICULAR JOINT: Primary | ICD-10-CM

## 2022-04-13 PROCEDURE — 97112 NEUROMUSCULAR REEDUCATION: CPT | Performed by: PHYSICAL THERAPIST

## 2022-04-13 PROCEDURE — 97110 THERAPEUTIC EXERCISES: CPT | Performed by: PHYSICAL THERAPIST

## 2022-04-13 PROCEDURE — 97140 MANUAL THERAPY 1/> REGIONS: CPT | Performed by: PHYSICAL THERAPIST

## 2022-04-13 NOTE — PROGRESS NOTES
Daily Note     Today's date: 2022  Patient name: Otto Sierra  : 1955  MRN: [id number]  Referring provider: Rene Grimm DO  Dx:   Encounter Diagnosis     ICD-10-CM    1  Arthritis of left acromioclavicular joint  M19 012                   Subjective: Pt reports being away due to a family emergency  Pt reports 8-9/10 shoulder pain today  "It is worse due to driving 4 hours yesterday  Objective: See treatment diary below      Assessment: Pt tolerated manual therapy well showing improved motion  Supine flexion with cane was most painful exercises reported  Pt able to show functional ROM well with all exercises  Monitor pain and add laser back in as needed  Plan: Continue per plan of care        Precautions: none      Manuals 2/28 3/2 3/9 3/15 3/21 3/23 3/29 4/13     LEFT Laser to LHB  4' 4' 4' 4'  4' 4'      Post/inf joint mobs        FH     Lt shoulder prom        FH                  Neuro Re-Ed             Cane ext with scap sq  10x10" 10"x10 10"x10 10"x10 10"x10 10"x10 10x10''     Posture tband             Isometrics  Flex/ER/IR 10x10" ea Flex/  ER/IR 10x10" ea Flex/ER/IR  10x10" EA Flex/ER 10'x10ea Flex/  ER/IR 10x10" ea Flex/IR/ER  10x10" ea 5x10'' flexion only     Prone Y,T,I                                                    Ther Ex             UBE (back)  6' 6' 6' 6' 6' 6'      Wall slides (flex) 10x10"  10"x10 10"x10  10"x10 10"x10 10x3''     Scap retraction 5"x15 review           Doorway stretch             Eccentric biceps curl   3#  15 3#X15 3# x20 3#  20 3#  20 20x 3#     Tband ER             Supine cane AAROM (flex)  10x10" 10"x10 10"x10 10"x10 10"x10 10"x10 10x10''     Supine serratus punch  x15 15 x15 x20 1# x20 1# 20 20x 1#     Supine flexion  x15 15  x20 1# x20 1# 20      sidelying ER        20x 1#     sidelying ABD        20x 1#                  Ther Activity

## 2022-04-14 ENCOUNTER — APPOINTMENT (OUTPATIENT)
Dept: PHYSICAL THERAPY | Facility: CLINIC | Age: 67
End: 2022-04-14
Payer: MEDICARE

## 2022-04-15 ENCOUNTER — APPOINTMENT (OUTPATIENT)
Dept: PHYSICAL THERAPY | Facility: CLINIC | Age: 67
End: 2022-04-15
Payer: MEDICARE

## 2022-04-22 ENCOUNTER — APPOINTMENT (OUTPATIENT)
Dept: PHYSICAL THERAPY | Facility: CLINIC | Age: 67
End: 2022-04-22
Payer: MEDICARE

## 2022-04-26 ENCOUNTER — APPOINTMENT (OUTPATIENT)
Dept: PHYSICAL THERAPY | Facility: CLINIC | Age: 67
End: 2022-04-26
Payer: MEDICARE

## 2022-04-28 ENCOUNTER — OFFICE VISIT (OUTPATIENT)
Dept: PHYSICAL THERAPY | Facility: CLINIC | Age: 67
End: 2022-04-28
Payer: MEDICARE

## 2022-04-28 DIAGNOSIS — M75.82 ROTATOR CUFF TENDONITIS, LEFT: Primary | ICD-10-CM

## 2022-04-28 PROCEDURE — 97110 THERAPEUTIC EXERCISES: CPT | Performed by: PHYSICAL THERAPIST

## 2022-04-28 PROCEDURE — 97140 MANUAL THERAPY 1/> REGIONS: CPT | Performed by: PHYSICAL THERAPIST

## 2022-04-28 NOTE — PROGRESS NOTES
Daily Note     Today's date: 2022  Patient name: Maritza Marquez  : 1955  MRN: [id number]  Referring provider: Kelly Hines DO  Dx:   Encounter Diagnosis     ICD-10-CM    1  Rotator cuff tendonitis, left  M75 82                   Subjective: Pt reports that her shoulder is feeling better  Objective: See treatment diary below      Assessment: All L UE strength and ROM is now WNL  Pt reports return to PLOF  Pt to transition to independent HEP  Plan: No further skilled PT required       Precautions: none      Manuals 2/28 3/2 3/9 3/15 3/21 3/23 3/29 4/13 4/28    LEFT Laser to LHB  4' 4' 4' 4'  4' 4'      Post/inf joint mobs        GILBERTO GUARDADO    Lt shoulder prom         MD                 Neuro Re-Ed             Cane ext with scap sq  10x10" 10"x10 10"x10 10"x10 10"x10 10"x10 10x10'' 10x10"    Posture tband             Isometrics  Flex/ER/IR 10x10" ea Flex/  ER/IR 10x10" ea Flex/ER/IR  10x10" EA Flex/ER 10'x10ea Flex/  ER/IR 10x10" ea Flex/IR/ER  10x10" ea 5x10'' flexion only flexion 10x10"    Prone Y,T,I                                                    Ther Ex             UBE (back)  6' 6' 6' 6' 6' 6'  6'    Wall slides (flex) 10x10"  10"x10 10"x10  10"x10 10"x10 10x3'' HEP    Doorway stretch             Eccentric biceps curl   3#  15 3#X15 3# x20 3#  20 3#  20 20x 3#     Tband ER             Supine cane AAROM (flex)  10x10" 10"x10 10"x10 10"x10 10"x10 10"x10 10x10'' 10x10"    Supine serratus punch  x15 15 x15 x20 1# x20 1# 20 20x 1# x20    Supine flexion  x15 15  x20 1# x20 1# 20      sidelying ER        20x 1# x20    sidelying ABD        20x 1# x20                 Ther Activity

## 2022-05-01 DIAGNOSIS — I10 BENIGN ESSENTIAL HYPERTENSION: ICD-10-CM

## 2022-05-01 DIAGNOSIS — I47.1 PSVT (PAROXYSMAL SUPRAVENTRICULAR TACHYCARDIA) (HCC): ICD-10-CM

## 2022-05-01 RX ORDER — NEBIVOLOL 10 MG/1
TABLET ORAL
Qty: 90 TABLET | Refills: 3 | Status: SHIPPED | OUTPATIENT
Start: 2022-05-01

## 2022-05-13 DIAGNOSIS — E03.9 HYPOTHYROIDISM, UNSPECIFIED TYPE: ICD-10-CM

## 2022-05-13 RX ORDER — LEVOTHYROXINE SODIUM 0.12 MG/1
TABLET ORAL
Qty: 90 TABLET | Refills: 1 | Status: SHIPPED | OUTPATIENT
Start: 2022-05-13

## 2022-05-31 ENCOUNTER — CLINICAL SUPPORT (OUTPATIENT)
Dept: FAMILY MEDICINE CLINIC | Facility: CLINIC | Age: 67
End: 2022-05-31
Payer: MEDICARE

## 2022-05-31 VITALS — TEMPERATURE: 97.8 F

## 2022-05-31 DIAGNOSIS — E53.8 VITAMIN B 12 DEFICIENCY: Primary | ICD-10-CM

## 2022-05-31 PROCEDURE — 96372 THER/PROPH/DIAG INJ SC/IM: CPT | Performed by: FAMILY MEDICINE

## 2022-05-31 RX ORDER — CYANOCOBALAMIN 1000 UG/ML
1000 INJECTION INTRAMUSCULAR; SUBCUTANEOUS ONCE
Status: COMPLETED | OUTPATIENT
Start: 2022-05-31 | End: 2022-05-31

## 2022-05-31 RX ADMIN — CYANOCOBALAMIN 1000 MCG: 1000 INJECTION INTRAMUSCULAR; SUBCUTANEOUS at 13:24

## 2022-06-01 ENCOUNTER — ANNUAL EXAM (OUTPATIENT)
Dept: OBGYN CLINIC | Facility: CLINIC | Age: 67
End: 2022-06-01
Payer: MEDICARE

## 2022-06-01 VITALS
BODY MASS INDEX: 30.23 KG/M2 | HEIGHT: 60 IN | DIASTOLIC BLOOD PRESSURE: 76 MMHG | SYSTOLIC BLOOD PRESSURE: 120 MMHG | WEIGHT: 154 LBS

## 2022-06-01 DIAGNOSIS — R92.1 CALCIFICATION OF LEFT BREAST ON MAMMOGRAPHY: ICD-10-CM

## 2022-06-01 DIAGNOSIS — Z12.31 ENCOUNTER FOR SCREENING MAMMOGRAM FOR BREAST CANCER: ICD-10-CM

## 2022-06-01 DIAGNOSIS — Z01.419 ENCOUNTER FOR ANNUAL ROUTINE GYNECOLOGICAL EXAMINATION: Primary | ICD-10-CM

## 2022-06-01 PROCEDURE — G0101 CA SCREEN;PELVIC/BREAST EXAM: HCPCS | Performed by: OBSTETRICS & GYNECOLOGY

## 2022-06-01 NOTE — PROGRESS NOTES
Assessment/Plan:  Pap smear deferred due to low risk status  Encouraged self-breast examination as well as calcium supplementation  Continue annual mammogram   Reviewed colon cancer screening, patient will be having Cologuard done through PCP  Return to office in 1 year or p r n  No problem-specific Assessment & Plan notes found for this encounter  Diagnoses and all orders for this visit:    Encounter for annual routine gynecological examination    Encounter for screening mammogram for breast cancer  -     Mammo diagnostic bilateral w 3d & cad; Future    Calcification of left breast on mammography  -     Mammo diagnostic bilateral w 3d & cad; Future          Subjective:      Patient ID: Eliu Weller is a 75 y o  female  HPI     This is a very pleasant 14-year-old female P2 ( x2, age 36, 38) presents for gyn exam   Patient went through menopause at age 47  She has never been on hormone replacement therapy  She denies any vaginal bleeding or spotting  No changes in bowel or bladder function  Patient has been  for over 5 7 years,  39 years  She does follow up with her family physician on a regular basis  Colonoscopy done age 47, age 60 normal   Patient is very active, enjoys working outdoors in traveling with friends and family  The following portions of the patient's history were reviewed and updated as appropriate: allergies, current medications, past family history, past medical history, past social history, past surgical history and problem list     Review of Systems   Constitutional: Negative for fatigue, fever and unexpected weight change  Respiratory: Negative for cough, chest tightness, shortness of breath and wheezing  Cardiovascular: Negative  Negative for chest pain and palpitations  Gastrointestinal: Negative  Negative for abdominal distention, abdominal pain, blood in stool, constipation, diarrhea, nausea and vomiting  Genitourinary: Negative    Negative for difficulty urinating, dyspareunia, dysuria, flank pain, frequency, genital sores, hematuria, pelvic pain, urgency, vaginal bleeding, vaginal discharge and vaginal pain  Skin: Negative for rash  Objective:      /76   Ht 5' (1 524 m)   Wt 69 9 kg (154 lb)   BMI 30 08 kg/m²          Physical Exam  Constitutional:       Appearance: Normal appearance  She is well-developed  Cardiovascular:      Rate and Rhythm: Normal rate and regular rhythm  Pulmonary:      Effort: Pulmonary effort is normal       Breath sounds: Normal breath sounds  Chest:   Breasts:      Right: No inverted nipple, mass, nipple discharge, skin change or tenderness  Left: No inverted nipple, mass, nipple discharge, skin change or tenderness  Abdominal:      General: Bowel sounds are normal  There is no distension  Palpations: Abdomen is soft  Tenderness: There is no abdominal tenderness  There is no guarding or rebound  Genitourinary:     Labia:         Right: No rash, tenderness or lesion  Left: No rash, tenderness or lesion  Vagina: Normal  No signs of injury  No vaginal discharge or tenderness  Cervix: No cervical motion tenderness, discharge, friability, lesion, erythema or cervical bleeding  Uterus: Not enlarged and not tender  Adnexa:         Right: No mass, tenderness or fullness  Left: No mass, tenderness or fullness  Comments: External genitalia is within normal limits  The vagina is evident of estrogen deficiency  There is no pelvic floor prolapse  Rectovaginal exam is confirmatory  Neurological:      Mental Status: She is alert and oriented to person, place, and time     Psychiatric:         Behavior: Behavior normal

## 2022-06-16 DIAGNOSIS — M51.9 DISC DISORDER OF LUMBAR REGION: ICD-10-CM

## 2022-06-16 RX ORDER — TIZANIDINE 4 MG/1
TABLET ORAL
Qty: 180 TABLET | Refills: 1 | Status: SHIPPED | OUTPATIENT
Start: 2022-06-16

## 2022-06-21 DIAGNOSIS — M51.36 DDD (DEGENERATIVE DISC DISEASE), LUMBAR: ICD-10-CM

## 2022-06-22 RX ORDER — HYDROCODONE BITARTRATE AND ACETAMINOPHEN 5; 325 MG/1; MG/1
TABLET ORAL
Qty: 30 TABLET | Refills: 0 | Status: SHIPPED | OUTPATIENT
Start: 2022-06-22 | End: 2022-08-01 | Stop reason: SDUPTHER

## 2022-06-24 ENCOUNTER — OFFICE VISIT (OUTPATIENT)
Dept: FAMILY MEDICINE CLINIC | Facility: CLINIC | Age: 67
End: 2022-06-24
Payer: MEDICARE

## 2022-06-24 ENCOUNTER — APPOINTMENT (OUTPATIENT)
Dept: LAB | Facility: CLINIC | Age: 67
End: 2022-06-24
Payer: MEDICARE

## 2022-06-24 VITALS
WEIGHT: 154 LBS | HEIGHT: 60 IN | OXYGEN SATURATION: 97 % | HEART RATE: 59 BPM | BODY MASS INDEX: 30.23 KG/M2 | RESPIRATION RATE: 16 BRPM | TEMPERATURE: 97.5 F | SYSTOLIC BLOOD PRESSURE: 120 MMHG | DIASTOLIC BLOOD PRESSURE: 60 MMHG

## 2022-06-24 DIAGNOSIS — M41.20 IDIOPATHIC SCOLIOSIS AND KYPHOSCOLIOSIS: ICD-10-CM

## 2022-06-24 DIAGNOSIS — H91.93 DECREASED HEARING OF BOTH EARS: ICD-10-CM

## 2022-06-24 DIAGNOSIS — M13.0 POLYARTICULAR ARTHRITIS: Primary | ICD-10-CM

## 2022-06-24 DIAGNOSIS — E03.9 HYPOTHYROIDISM, UNSPECIFIED TYPE: ICD-10-CM

## 2022-06-24 DIAGNOSIS — E78.2 MIXED HYPERLIPIDEMIA: ICD-10-CM

## 2022-06-24 DIAGNOSIS — M51.9 DISC DISORDER OF LUMBAR REGION: ICD-10-CM

## 2022-06-24 DIAGNOSIS — M13.0 POLYARTICULAR ARTHRITIS: ICD-10-CM

## 2022-06-24 DIAGNOSIS — E53.8 VITAMIN B 12 DEFICIENCY: ICD-10-CM

## 2022-06-24 LAB
ALBUMIN SERPL BCP-MCNC: 4 G/DL (ref 3.5–5)
ALP SERPL-CCNC: 97 U/L (ref 46–116)
ALT SERPL W P-5'-P-CCNC: 33 U/L (ref 12–78)
ANION GAP SERPL CALCULATED.3IONS-SCNC: 5 MMOL/L (ref 4–13)
AST SERPL W P-5'-P-CCNC: 26 U/L (ref 5–45)
BASOPHILS # BLD AUTO: 0.04 THOUSANDS/ΜL (ref 0–0.1)
BASOPHILS NFR BLD AUTO: 1 % (ref 0–1)
BILIRUB SERPL-MCNC: 0.43 MG/DL (ref 0.2–1)
BUN SERPL-MCNC: 21 MG/DL (ref 5–25)
CALCIUM SERPL-MCNC: 9.3 MG/DL (ref 8.3–10.1)
CHLORIDE SERPL-SCNC: 105 MMOL/L (ref 100–108)
CHOLEST SERPL-MCNC: 175 MG/DL
CK SERPL-CCNC: 51 U/L (ref 26–192)
CO2 SERPL-SCNC: 29 MMOL/L (ref 21–32)
CREAT SERPL-MCNC: 0.67 MG/DL (ref 0.6–1.3)
CRP SERPL QL: 3.5 MG/L
EOSINOPHIL # BLD AUTO: 0.15 THOUSAND/ΜL (ref 0–0.61)
EOSINOPHIL NFR BLD AUTO: 2 % (ref 0–6)
ERYTHROCYTE [DISTWIDTH] IN BLOOD BY AUTOMATED COUNT: 13.2 % (ref 11.6–15.1)
ERYTHROCYTE [SEDIMENTATION RATE] IN BLOOD: 23 MM/HOUR (ref 0–29)
FOLATE SERPL-MCNC: 9.6 NG/ML (ref 3.1–17.5)
GFR SERPL CREATININE-BSD FRML MDRD: 90 ML/MIN/1.73SQ M
GLUCOSE P FAST SERPL-MCNC: 102 MG/DL (ref 65–99)
HCT VFR BLD AUTO: 37.7 % (ref 34.8–46.1)
HDLC SERPL-MCNC: 50 MG/DL
HGB BLD-MCNC: 12.5 G/DL (ref 11.5–15.4)
IMM GRANULOCYTES # BLD AUTO: 0.02 THOUSAND/UL (ref 0–0.2)
IMM GRANULOCYTES NFR BLD AUTO: 0 % (ref 0–2)
LDLC SERPL CALC-MCNC: 88 MG/DL (ref 0–100)
LYMPHOCYTES # BLD AUTO: 1.63 THOUSANDS/ΜL (ref 0.6–4.47)
LYMPHOCYTES NFR BLD AUTO: 20 % (ref 14–44)
MCH RBC QN AUTO: 30.5 PG (ref 26.8–34.3)
MCHC RBC AUTO-ENTMCNC: 33.2 G/DL (ref 31.4–37.4)
MCV RBC AUTO: 92 FL (ref 82–98)
MONOCYTES # BLD AUTO: 0.53 THOUSAND/ΜL (ref 0.17–1.22)
MONOCYTES NFR BLD AUTO: 6 % (ref 4–12)
NEUTROPHILS # BLD AUTO: 5.92 THOUSANDS/ΜL (ref 1.85–7.62)
NEUTS SEG NFR BLD AUTO: 71 % (ref 43–75)
NONHDLC SERPL-MCNC: 125 MG/DL
NRBC BLD AUTO-RTO: 0 /100 WBCS
PLATELET # BLD AUTO: 183 THOUSANDS/UL (ref 149–390)
PMV BLD AUTO: 9.4 FL (ref 8.9–12.7)
POTASSIUM SERPL-SCNC: 4.6 MMOL/L (ref 3.5–5.3)
PROT SERPL-MCNC: 8 G/DL (ref 6.4–8.2)
RBC # BLD AUTO: 4.1 MILLION/UL (ref 3.81–5.12)
SODIUM SERPL-SCNC: 139 MMOL/L (ref 136–145)
TRIGL SERPL-MCNC: 186 MG/DL
TSH SERPL DL<=0.05 MIU/L-ACNC: 0.64 UIU/ML (ref 0.45–4.5)
URATE SERPL-MCNC: 4.3 MG/DL (ref 2–6.8)
VIT B12 SERPL-MCNC: 443 PG/ML (ref 100–900)
WBC # BLD AUTO: 8.29 THOUSAND/UL (ref 4.31–10.16)

## 2022-06-24 PROCEDURE — 36415 COLL VENOUS BLD VENIPUNCTURE: CPT

## 2022-06-24 PROCEDURE — 86225 DNA ANTIBODY NATIVE: CPT

## 2022-06-24 PROCEDURE — 86039 ANTINUCLEAR ANTIBODIES (ANA): CPT

## 2022-06-24 PROCEDURE — 85025 COMPLETE CBC W/AUTO DIFF WBC: CPT

## 2022-06-24 PROCEDURE — 80053 COMPREHEN METABOLIC PANEL: CPT

## 2022-06-24 PROCEDURE — 86618 LYME DISEASE ANTIBODY: CPT

## 2022-06-24 PROCEDURE — 84443 ASSAY THYROID STIM HORMONE: CPT

## 2022-06-24 PROCEDURE — 86038 ANTINUCLEAR ANTIBODIES: CPT

## 2022-06-24 PROCEDURE — 82746 ASSAY OF FOLIC ACID SERUM: CPT

## 2022-06-24 PROCEDURE — 86430 RHEUMATOID FACTOR TEST QUAL: CPT

## 2022-06-24 PROCEDURE — 86235 NUCLEAR ANTIGEN ANTIBODY: CPT

## 2022-06-24 PROCEDURE — 82607 VITAMIN B-12: CPT

## 2022-06-24 PROCEDURE — 85652 RBC SED RATE AUTOMATED: CPT

## 2022-06-24 PROCEDURE — 99214 OFFICE O/P EST MOD 30 MIN: CPT | Performed by: FAMILY MEDICINE

## 2022-06-24 PROCEDURE — 86140 C-REACTIVE PROTEIN: CPT

## 2022-06-24 PROCEDURE — 82550 ASSAY OF CK (CPK): CPT

## 2022-06-24 PROCEDURE — 86200 CCP ANTIBODY: CPT

## 2022-06-24 PROCEDURE — 80061 LIPID PANEL: CPT

## 2022-06-24 PROCEDURE — 84550 ASSAY OF BLOOD/URIC ACID: CPT

## 2022-06-24 RX ORDER — METHYLPREDNISOLONE 4 MG/1
TABLET ORAL
Qty: 21 EACH | Refills: 0 | Status: SHIPPED | OUTPATIENT
Start: 2022-06-24

## 2022-06-24 NOTE — PROGRESS NOTES
[facility] PRACTICE OFFICE VISIT       NAME: Beatriz Mayo  AGE: 75 y o  SEX: female       : 1955        MRN: [id number]        Assessment and Plan     1  Polyarticular arthritis  Assessment & Plan:  Arthritic deformities and polyarticular arthralgias  Multilevel disc disease of cervical and lumbar spine, scoliosis, kyphosis  Patient suffers from daily pain and decreased range of motion affecting her ADLs   She has been managing her pain with p r n  ibuprofen, muscle relaxants and rare use of hydrocodone  Patient is appropriate candidate for medical marijuana license due to her severe pain  I will provide a letter of support  Proceed with blood work to rule out connective tissue disease/gout  Recent hand x-ray did not reveal any erosive changes consistent with RA  Start Medrol Dosepak for symptom control now  Patient may benefit from rheumatology consult, await for lab results    Orders:  -     Anti-DNA antibody, double-stranded; Future  -     ISAAC Screen w/ Reflex to Titer/Pattern; Future  -     Cyclic citrul peptide antibody, IgG; Future  -     C-reactive protein; Future  -     RF Screen w/ Reflex to Titer; Future  -     Sedimentation rate, automated; Future  -     Sjogren's Antibodies; Future  -     Lyme Total Antibody Profile with reflex to WB; Future  -     Uric acid; Future  -     methylPREDNISolone 4 MG tablet therapy pack; Use as directed on package    2  Disc disorder of lumbar region  Assessment & Plan:  Chronic low back pain with multilevel disc disease, scoliosis and stenosis  3  Decreased hearing of both ears  -     Ambulatory Referral to Otolaryngology; Future    4  Scoliosis (and kyphoscoliosis), idiopathic                      There are no Patient Instructions on file for this visit  No follow-ups on file  Discussed with the patient and all questioned fully answered  She will call me if any problems arise  M*Modal software was used to dictate this note   It may contain errors with dictating incorrect words/spelling  Please contact provider directly with any questions  Chief Complaint     Chief Complaint   Patient presents with    Medication Management     Would like to discuss pain creams    Generalized Body Aches     Left shoulder/arm and bilateral wrists/left hip        History of Present Illness     Chronic polyarticular arthritic pain  Patient complains in pain multiple joints including hands, wrists, shoulders, knees, hips as well as chronic neck and back pain  Patient is under care of orthopedic specialist, physical therapy as well as acupuncture specialist   Patient with known multilevel cervical and lumbar disc disease  Ibuprofen 600 mg twice a day "takes an edge off" her chronic pain  She uses muscle relaxants as needed for back pain  She takes hydrocodone very infrequently, sparingly, as directed, no history of misuse  Patient reports 10 minute morning stiffness  Pain in multiple joints persist throughout the day  Patient had right hand x-ray back in October of 2022:  No rules of changes  She remains active in spite of her pain  But pain has been significantly affecting her ADLs  Patient reports difficulty opening jars and performing simple tasks  Patient reports that short course of corticosteroids was extremely helpful and relieved her pain for some time  Patient does not recall rheumatology consultation in the past     Patient is also complaining of decreased hearing within past year  No tinnitus, no dizziness  Patient remains on daily medications for hypertension, hyperlipidemia, hypothyroidism  She is planning to proceed with routine blood work today  Patient would like to pursue application for medical marijuana  She is here to inquire about proper process and request support letter from her family doctor  Generalized Body Aches  Associated symptoms include neck pain         Review of Systems   Review of Systems Constitutional: Negative  HENT: Negative  Eyes: Negative  Respiratory: Negative  Cardiovascular: Negative  Gastrointestinal: Negative  Endocrine: Negative  Genitourinary: Negative  Musculoskeletal: Positive for arthralgias, back pain and neck pain  Skin: Negative  Allergic/Immunologic: Negative  Neurological: Negative  Hematological: Negative  Psychiatric/Behavioral: Negative          Active Problem List     Patient Active Problem List   Diagnosis    Benign essential hypertension    Hypothyroidism    Lumbosacral radiculitis    Cervical stenosis of spinal canal    Disc disorder of lumbar region    Gastroesophageal reflux disease without esophagitis    Seasonal allergic rhinitis due to pollen    PSVT (paroxysmal supraventricular tachycardia) (HCC)    Iron deficiency anemia secondary to inadequate dietary iron intake    Irritable bowel syndrome with diarrhea    Depression with anxiety    Mixed hyperlipidemia    Scoliosis (and kyphoscoliosis), idiopathic    Vitamin B 12 deficiency    KERON (generalized anxiety disorder)    Calcification of left breast on mammography    Personal history of COVID-19    Obstructive sleep apnea syndrome    Continuous opioid dependence (HCC)    Impingement syndrome of left shoulder    Chronic left hip pain    Polyarticular arthritis       Past Medical History:  Past Medical History:   Diagnosis Date    Anemia     Iron and B-12 anemia    Anxiety and depression     Bleeding ulcer     Disease of thyroid gland     GERD (gastroesophageal reflux disease)     Groin pain, right 2/3/2021    Scoliosis     Spinal meningioma [facility])        Past Surgical History:  Past Surgical History:   Procedure Laterality Date    SPINAL CORD STIMULATOR IMPLANT      SPINE SURGERY  1996     Bibiana pepper [facility]       Family History:  Family History   Problem Relation Age of Onset    Heart disease Mother     Skin cancer Mother         unsure of type    Esophageal cancer Father     Kidney cancer Father     Thyroid disease Father     Diabetes Son     Skin cancer Sister         unsure of type    Cervical cancer Maternal Grandmother     No Known Problems Maternal Grandfather     No Known Problems Paternal Grandmother     No Known Problems Paternal Grandfather     No Known Problems Sister     Skin cancer Maternal Aunt         unsure of type    No Known Problems Paternal Aunt     No Known Problems Paternal Aunt        Social History:  Social History     Socioeconomic History    Marital status:      Spouse name: Not on file    Number of children: Not on file    Years of education: Not on file    Highest education level: Not on file   Occupational History    Not on file   Tobacco Use    Smoking status: Never Smoker    Smokeless tobacco: Never Used   Vaping Use    Vaping Use: Never used   Substance and Sexual Activity    Alcohol use: Yes     Comment: rare    Drug use: No    Sexual activity: Not Currently     Partners: Male   Other Topics Concern    Not on file   Social History Narrative        Retired    2 adult sons    Grandchildren     Social Determinants of Health     Financial Resource Strain: Not on file   Food Insecurity: Not on file   Transportation Needs: Not on file   Physical Activity: Not on file   Stress: Not on file   Social Connections: Not on file   Intimate Partner Violence: Not on file   Housing Stability: Not on file         Objective     Vitals:    06/24/22 1118   BP: 120/60   BP Location: Right arm   Patient Position: Sitting   Cuff Size: Standard   Pulse: 59   Resp: 16   Temp: 97 5 °F (36 4 °C)   TempSrc: Temporal   SpO2: 97%   Weight: 69 9 kg (154 lb)   Height: 5' (1 524 m)       Wt Readings from Last 3 Encounters:   06/24/22 69 9 kg (154 lb)   06/01/22 69 9 kg (154 lb)   04/05/22 70 3 kg (155 lb)       Physical Exam  Vitals and nursing note reviewed     Constitutional:       General: She is not in acute distress  Appearance: Normal appearance  She is well-developed  She is not ill-appearing  HENT:      Head: Normocephalic and atraumatic  Eyes:      Conjunctiva/sclera: Conjunctivae normal    Neck:      Thyroid: No thyromegaly  Vascular: No carotid bruit  Cardiovascular:      Rate and Rhythm: Normal rate and regular rhythm  Heart sounds: Normal heart sounds  No murmur heard  Pulmonary:      Effort: Pulmonary effort is normal  No respiratory distress  Breath sounds: Normal breath sounds  No wheezing  Abdominal:      General: There is no abdominal bruit  Musculoskeletal:         General: Normal range of motion  Cervical back: Neck supple  No rigidity  Right lower leg: No edema  Left lower leg: No edema  Comments:  Heberden's and Sofia's nodules    Arthritic deformities of hands, wrists, knees  Antalgic gait  Thoracic kyphosis   Neurological:      General: No focal deficit present  Mental Status: She is alert and oriented to person, place, and time  Cranial Nerves: No cranial nerve deficit        Coordination: Coordination normal    Psychiatric:         Behavior: Behavior normal           Pertinent Laboratory/Diagnostic Studies:    Lab Results   Component Value Date    WBC 8 29 06/24/2022    HGB 12 5 06/24/2022    HCT 37 7 06/24/2022    MCV 92 06/24/2022     06/24/2022       No results found for: TSH    No results found for: CHOL  Lab Results   Component Value Date    TRIG 186 (H) 06/24/2022     Lab Results   Component Value Date    HDL 50 06/24/2022     Lab Results   Component Value Date    LDLCALC 88 06/24/2022     No results found for: HGBA1C  Lab Results   Component Value Date    SODIUM 139 06/24/2022    K 4 6 06/24/2022     06/24/2022    CO2 29 06/24/2022    AGAP 5 06/24/2022    BUN 21 06/24/2022    CREATININE 0 67 06/24/2022    GLUC 112 10/25/2020    GLUF 102 (H) 06/24/2022    CALCIUM 9 3 06/24/2022    AST 26 06/24/2022    ALT 33 06/24/2022    ALKPHOS 97 06/24/2022    TP 8 0 06/24/2022    TBILI 0 43 06/24/2022    EGFR 90 06/24/2022       Orders Placed This Encounter   Procedures    Anti-DNA antibody, double-stranded    ISAAC Screen w/ Reflex to Titer/Pattern    Cyclic citrul peptide antibody, IgG    C-reactive protein    RF Screen w/ Reflex to Titer    Sedimentation rate, automated    Sjogren's Antibodies    Lyme Total Antibody Profile with reflex to WB    Uric acid    Ambulatory Referral to Otolaryngology       ALLERGIES:  Allergies   Allergen Reactions    Cephalosporins Other (See Comments)     GI Upset (vomit/diarrhea)    Medical Tape Rash    Metformin      Other reaction(s): Nausea and/or vomiting    Nabumetone GI Intolerance    Other GI Intolerance    Anesthesia S-I-40 [Propofol]     Celecoxib     Cephalexin     Choline Magnesium Trisalicylate     Diclofenac     Diclofenac Potassium(Migraine) GI Intolerance    Diclofenac Sodium GI Intolerance    Fentanyl     Propoxyphene Other (See Comments)     unknown    Sulfa Antibiotics     Sulfacetamide     Zolpidem        Current Medications     Current Outpatient Medications   Medication Sig Dispense Refill    amLODIPine (NORVASC) 5 mg tablet TAKE 1 TABLET BY MOUTH EVERY DAY 90 tablet 1    atorvastatin (LIPITOR) 40 mg tablet TAKE 1 TABLET BY MOUTH 4 DAYS PER WEEK 54 tablet 1    Calcium Carbonate-Vitamin D (CALCIUM CREAMIES PO) Take 600 mg by mouth daily      Cholecalciferol 25 MCG (1000 UT) capsule Take 2,000 Units by mouth      clonazePAM (KlonoPIN) 1 mg tablet Take half a tablet (0 5 mg) twice a day as needed for anxiety 30 tablet 5    Cyanocobalamin 1000 MCG/ML LIQD cyanocobalamin (vit B-12) 1,000 mcg/mL injection solution      ferrous sulfate 325 (65 Fe) mg tablet Take by oral route        HYDROcodone-acetaminophen (NORCO) 5-325 mg per tablet Take 1 tablet 2 times a day as needed for severe low back pain 30 tablet 0    levothyroxine 125 mcg tablet TAKE 1 TABLET BY MOUTH EVERY DAY 90 tablet 1    metaxalone (SKELAXIN) 800 mg tablet Take 1 tablet (800 mg total) by mouth 2 (two) times a day as needed for muscle spasms 60 tablet 2    methylPREDNISolone 4 MG tablet therapy pack Use as directed on package 21 each 0    nebivolol (BYSTOLIC) 10 mg tablet TAKE 1 TABLET BY MOUTH EVERY DAY 90 tablet 3    omeprazole (PriLOSEC) 20 mg delayed release capsule omeprazole 20 mg capsule,delayed release      pantoprazole (PROTONIX) 40 mg tablet TAKE 1 TABLET BY MOUTH DAILY AS NEEDED 30 tablet 6    sertraline (ZOLOFT) 100 mg tablet TAKE 2 TABLETS BY MOUTH EVERY  tablet 3    tiZANidine (ZANAFLEX) 4 mg tablet TAKE 2 TABLETS BY MOUTH AT BEDTIME AS NEEDED FOR MUSCLE SPASMS 180 tablet 1    dicyclomine (BENTYL) 20 mg tablet TAKE 1 TABLET BY MOUTH 2 TIMES A DAY AS NEEDED (ABDOMINAL BLOATING/PAIN) 30 tablet 5     Current Facility-Administered Medications   Medication Dose Route Frequency Provider Last Rate Last Admin    cyanocobalamin injection 1,000 mcg  1,000 mcg Intramuscular Q30 Days Crispin Daugherty MD   1,000 mcg at 02/21/18 1534    cyanocobalamin injection 1,000 mcg  1,000 mcg Intramuscular Q30 Days Crispin Daugherty MD   1,000 mcg at 03/29/18 0717    cyanocobalamin injection 1,000 mcg  1,000 mcg Intramuscular Q30 Days Crispin Daugherty MD   1,000 mcg at 04/30/18 1737    cyanocobalamin injection 1,000 mcg  1,000 mcg Intramuscular M33 Days Kirt Kelly MD   6,542 mcg at 08/10/18 1017    cyanocobalamin injection 1,000 mcg  1,000 mcg Intramuscular Q30 Days Crispin Daugherty MD   1,000 mcg at 09/10/18 1127    cyanocobalamin injection 1,000 mcg  1,000 mcg Intramuscular Q30 Days Crispin Daugherty MD   1,000 mcg at 02/11/19 0958    cyanocobalamin injection 1,000 mcg  1,000 mcg Intramuscular Q30 Days Crispin Daugherty MD   1,000 mcg at 09/23/19 1739    cyanocobalamin injection 1,000 mcg  1,000 mcg Intramuscular Q30 Days Crispin Daugherty MD   1,000 mcg at 10/29/19 2010    cyanocobalamin injection 1,000 mcg  1,000 mcg Intramuscular Q30 Days Hadley Joseph MD   1,000 mcg at 12/06/19 1513    cyanocobalamin injection 1,000 mcg  1,000 mcg Intramuscular Q30 Days Javon Castellanos MD   1,000 mcg at 07/02/20 1412    cyanocobalamin injection 1,000 mcg  1,000 mcg Intramuscular Q30 Days Javon Castellanos MD   1,000 mcg at 06/01/21 1100       Medications Discontinued During This Encounter   Medication Reason    fluticasone (FLONASE) 50 mcg/act nasal spray Therapy completed       Health Maintenance     Health Maintenance   Topic Date Due    DXA SCAN  Never done    Osteoporosis Screening  Never done    Pneumococcal Vaccine: 65+ Years (2 - PPSV23 or PCV20) 05/09/2020    COVID-19 Vaccine (3 - Booster for Moderna series) 09/28/2021    BMI: Followup Plan  05/15/2022    Influenza Vaccine (Season Ended) 09/01/2022    Medicare Annual Wellness Visit (AWV)  11/15/2022    Fall Risk  02/28/2023    Breast Cancer Screening: Mammogram  05/25/2023    BMI: Adult  06/24/2023    Colorectal Cancer Screening  08/28/2023    Cervical Cancer Screening  06/30/2024    DTaP,Tdap,and Td Vaccines (3 - Td or Tdap) 04/22/2029    Hepatitis C Screening  Completed    HIB Vaccine  Aged Out    Hepatitis B Vaccine  Aged Out    IPV Vaccine  Aged Out    Hepatitis A Vaccine  Aged Out    Meningococcal ACWY Vaccine  Aged Out    HPV Vaccine  Aged Out       Immunization History   Administered Date(s) Administered    COVID-19 MODERNA VACC 0 5 ML IM 03/31/2021, 04/28/2021    INFLUENZA 10/08/2007, 10/24/2008, 10/13/2009, 10/17/2011, 10/13/2012, 10/21/2013, 11/05/2014, 10/02/2015, 10/13/2016, 10/10/2017, 10/10/2017    Influenza, recombinant, quadrivalent,injectable, preservative free 10/10/2018    Pneumococcal Conjugate 13-Valent 05/09/2019    Td (adult), Unspecified 08/12/1993    Tdap 02/17/2008, 04/22/2019    Zoster 04/29/2014       Javon Castellanos MD

## 2022-06-25 LAB
B BURGDOR IGG+IGM SER-ACNC: <0.2 AI
DSDNA AB SER-ACNC: 9 IU/ML (ref 0–9)
ENA SS-A AB SER-ACNC: <0.2 AI (ref 0–0.9)
ENA SS-B AB SER-ACNC: <0.2 AI (ref 0–0.9)
RHEUMATOID FACT SER QL LA: NEGATIVE

## 2022-06-27 LAB
ANA HOMOGEN SER QL IF: NORMAL
ANA HOMOGEN TITR SER: NORMAL {TITER}
RYE IGE QN: POSITIVE

## 2022-06-28 ENCOUNTER — TELEPHONE (OUTPATIENT)
Dept: FAMILY MEDICINE CLINIC | Facility: CLINIC | Age: 67
End: 2022-06-28

## 2022-06-28 DIAGNOSIS — M13.0 POLYARTICULAR ARTHRITIS: Primary | ICD-10-CM

## 2022-06-28 LAB — CCP AB SER IA-ACNC: 1.8

## 2022-06-28 NOTE — TELEPHONE ENCOUNTER
Please contact patient  Her general blood work as well as testing for connective tissue disease was essentially normal/negative  One of her tests, ISAAC (testing for lupus) came weekly positive and I believe it is not an indication of lupus but rather variation of normal   It is frequently encountered among healthy individuals  Nevertheless, given the degree of her significant joint pain, I believe consultation with Rheumatology will be helpful  I recommend that she schedules consultation with Dr Jaeger at [place] [id number]  Orders placed  Letter in support of medical marijuana printed, it should be signed in mail to patient      Thank you

## 2022-06-28 NOTE — ASSESSMENT & PLAN NOTE
· Arthritic deformities and polyarticular arthralgias  · Multilevel disc disease of cervical and lumbar spine, scoliosis, kyphosis  · Patient suffers from daily pain and decreased range of motion affecting her ADLs   · She has been managing her pain with p r n  ibuprofen, muscle relaxants and rare use of hydrocodone  · Patient is appropriate candidate for medical marijuana license due to her severe pain    I will provide a letter of support  · Proceed with blood work to rule out connective tissue disease/gout  · Recent hand x-ray did not reveal any erosive changes consistent with RA  · Start Medrol Dosepak for symptom control now  · Patient may benefit from rheumatology consult, await for lab results

## 2022-07-09 DIAGNOSIS — K58.0 IRRITABLE BOWEL SYNDROME WITH DIARRHEA: ICD-10-CM

## 2022-07-09 RX ORDER — DICYCLOMINE HCL 20 MG
TABLET ORAL
Qty: 180 TABLET | Refills: 0 | Status: SHIPPED | OUTPATIENT
Start: 2022-07-09 | End: 2022-10-26

## 2022-07-31 DIAGNOSIS — I10 BENIGN ESSENTIAL HYPERTENSION: ICD-10-CM

## 2022-07-31 RX ORDER — AMLODIPINE BESYLATE 5 MG/1
TABLET ORAL
Qty: 90 TABLET | Refills: 1 | Status: SHIPPED | OUTPATIENT
Start: 2022-07-31

## 2022-08-01 DIAGNOSIS — M51.36 DDD (DEGENERATIVE DISC DISEASE), LUMBAR: ICD-10-CM

## 2022-08-03 RX ORDER — HYDROCODONE BITARTRATE AND ACETAMINOPHEN 5; 325 MG/1; MG/1
TABLET ORAL
Qty: 30 TABLET | Refills: 0 | Status: SHIPPED | OUTPATIENT
Start: 2022-08-03 | End: 2022-10-07 | Stop reason: SDUPTHER

## 2022-08-03 RX ORDER — NALOXONE HYDROCHLORIDE 4 MG/.1ML
SPRAY NASAL
Qty: 1 EACH | Refills: 1 | Status: SHIPPED | OUTPATIENT
Start: 2022-08-03

## 2022-08-05 ENCOUNTER — CLINICAL SUPPORT (OUTPATIENT)
Dept: FAMILY MEDICINE CLINIC | Facility: CLINIC | Age: 67
End: 2022-08-05
Payer: MEDICARE

## 2022-08-05 DIAGNOSIS — E53.8 VITAMIN B 12 DEFICIENCY: Primary | ICD-10-CM

## 2022-08-05 PROCEDURE — 96372 THER/PROPH/DIAG INJ SC/IM: CPT

## 2022-08-05 RX ORDER — CYANOCOBALAMIN 1000 UG/ML
1000 INJECTION, SOLUTION INTRAMUSCULAR; SUBCUTANEOUS ONCE
Status: COMPLETED | OUTPATIENT
Start: 2022-08-05 | End: 2022-08-05

## 2022-08-05 RX ADMIN — CYANOCOBALAMIN 1000 MCG: 1000 INJECTION, SOLUTION INTRAMUSCULAR; SUBCUTANEOUS at 11:11

## 2022-09-05 DIAGNOSIS — E78.2 MIXED HYPERLIPIDEMIA: ICD-10-CM

## 2022-09-05 RX ORDER — ATORVASTATIN CALCIUM 40 MG/1
TABLET, FILM COATED ORAL
Qty: 54 TABLET | Refills: 1 | Status: SHIPPED | OUTPATIENT
Start: 2022-09-05

## 2022-09-09 ENCOUNTER — APPOINTMENT (OUTPATIENT)
Dept: RADIOLOGY | Facility: MEDICAL CENTER | Age: 67
End: 2022-09-09
Payer: MEDICARE

## 2022-09-09 ENCOUNTER — OFFICE VISIT (OUTPATIENT)
Dept: OBGYN CLINIC | Facility: MEDICAL CENTER | Age: 67
End: 2022-09-09
Payer: MEDICARE

## 2022-09-09 VITALS
WEIGHT: 154.2 LBS | HEIGHT: 60 IN | BODY MASS INDEX: 30.27 KG/M2 | DIASTOLIC BLOOD PRESSURE: 72 MMHG | SYSTOLIC BLOOD PRESSURE: 147 MMHG | HEART RATE: 78 BPM

## 2022-09-09 DIAGNOSIS — M41.9 SCOLIOSIS, UNSPECIFIED SCOLIOSIS TYPE, UNSPECIFIED SPINAL REGION: ICD-10-CM

## 2022-09-09 DIAGNOSIS — M46.1 SACROILIITIS (HCC): ICD-10-CM

## 2022-09-09 DIAGNOSIS — M25.552 PAIN IN LEFT HIP: ICD-10-CM

## 2022-09-09 DIAGNOSIS — M76.32 ILIOTIBIAL BAND SYNDROME OF LEFT SIDE: ICD-10-CM

## 2022-09-09 DIAGNOSIS — M70.62 TROCHANTERIC BURSITIS OF LEFT HIP: Primary | ICD-10-CM

## 2022-09-09 DIAGNOSIS — M16.12 PRIMARY OSTEOARTHRITIS OF LEFT HIP: ICD-10-CM

## 2022-09-09 DIAGNOSIS — M54.16 RADICULOPATHY, LUMBAR REGION: ICD-10-CM

## 2022-09-09 PROCEDURE — 73502 X-RAY EXAM HIP UNI 2-3 VIEWS: CPT

## 2022-09-09 PROCEDURE — 99214 OFFICE O/P EST MOD 30 MIN: CPT | Performed by: ORTHOPAEDIC SURGERY

## 2022-09-09 NOTE — PROGRESS NOTES
Assessment/Plan     1  Trochanteric bursitis of left hip    2  Pain in left hip    3  Scoliosis, unspecified scoliosis type, unspecified spinal region    4  Radiculopathy, lumbar region    5  Sacroiliitis ([address] )    6  Iliotibial band syndrome of left side    7  Primary osteoarthritis of left hip      Orders Placed This Encounter   Procedures    XR hip/pelv 2-3 vws left if performed    Ambulatory referral to Physical Therapy    Ambulatory referral to Pain Management    Steroid Injection     Ms Colón L hip pain is likely multifactorial     She had tenderness over her trochanteric bursa so we did a trochanteric bursa steroid injection today  She also tenderness over sacroiliac joint so she will go for an injection over her left SI joint  Physical therapy was also ordered  A referral to Pain Management regarding her lumbar spine was also placed    Return in about 2 months (around 11/9/2022)  I answered all of the patient's questions during the visit and provided education of the patient's condition during the visit  The patient verbalized understanding of the information given and agrees with the plan  This note was dictated using [employer] software  It may contain errors including improperly dictated words  Please contact physician directly for any questions  History of Present Illness   Chief complaint:   Chief Complaint   Patient presents with    Left Hip - Pain       HPI: Ivy Mejia is a 75 y o  female that c/o left hip pain  She has had pain for the past couple years  Most her pain is over the posterolateral aspect and radiates into the lateral thigh  She describes as a burning pain in her thigh  She knows that 1 point she was told she has a history of hip dysplasia  She also admits some frequent falls and that she feels off balance due to having some issues with her back  Recently her hip pain is worsen  She feels very limited with walking  Her pain is constant    She also notes some left leg weakness has been present for years  Her left leg feels longer to her  She has a history of back surgery  Mccarty rods were placed in  by Dr Jerel Wade at [facility]  She continues to have pain and these were removed in  and without relief  Currently she ambulates with a cane  She [facility]ologist [facility]  She has a positive ISAAC but was told she does not have lupus  She is not taking any special medication for any rheumatologic disorders  She takes Advil which is helpful  She also takes hydrocodone and muscle relaxants  She has not tried physical therapy  She had a questionable injection her hip but this was done via the posterior approach so we are unsure of what type of injection this was  She denies any surgery on her left hip  She denies any recent bowel or bladder changes  She has a history of a spinal cord stimulator placed but that  years ago   ROS:    See HPI for musculoskeletal review     All other systems reviewed are negative     Historical Information   Past Medical History:   Diagnosis Date    Anemia     Iron and B-12 anemia    Anxiety and depression     Bleeding ulcer     Disease of thyroid gland     GERD (gastroesophageal reflux disease)     Groin pain, right 2/3/2021    Scoliosis     Spinal meningioma (HCC)      Past Surgical History:   Procedure Laterality Date    SPINAL CORD STIMULATOR IMPLANT      SPINE SURGERY       Mccarty rods, [facility]     Social History   Social History     Substance and Sexual Activity   Alcohol Use Yes    Comment: rare     Social History     Substance and Sexual Activity   Drug Use No     Social History     Tobacco Use   Smoking Status Never Smoker   Smokeless Tobacco Never Used     Family History:   Family History   Problem Relation Age of Onset    Heart disease Mother     Skin cancer Mother         unsure of type    Esophageal cancer Father     Kidney cancer Father     Thyroid disease Father    [facility] Diabetes Son     Skin cancer Sister         unsure of type    Cervical cancer Maternal Grandmother     No Known Problems Maternal Grandfather     No Known Problems Paternal Grandmother     No Known Problems Paternal Grandfather     No Known Problems Sister     Skin cancer Maternal Aunt         unsure of type    No Known Problems Paternal Aunt     No Known Problems Paternal Aunt        Current Outpatient Medications on File Prior to Visit   Medication Sig Dispense Refill    amLODIPine (NORVASC) 5 mg tablet TAKE 1 TABLET BY MOUTH EVERY DAY 90 tablet 1    atorvastatin (LIPITOR) 40 mg tablet TAKE 1 TABLET BY MOUTH 4 DAYS PER WEEK 54 tablet 1    Calcium Carbonate-Vitamin D (CALCIUM CREAMIES PO) Take 600 mg by mouth daily      Cholecalciferol 25 MCG (1000 UT) capsule Take 2,000 Units by mouth      clonazePAM (KlonoPIN) 1 mg tablet Take half a tablet (0 5 mg) twice a day as needed for anxiety 30 tablet 5    Cyanocobalamin 1000 MCG/ML LIQD cyanocobalamin (vit B-12) 1,000 mcg/mL injection solution      dicyclomine (BENTYL) 20 mg tablet TAKE 1 TABLET BY MOUTH 2 TIMES A DAY AS NEEDED (ABDOMINAL BLOATING/PAIN) 180 tablet 0    ferrous sulfate 325 (65 Fe) mg tablet Take by oral route   HYDROcodone-acetaminophen (NORCO) 5-325 mg per tablet Take 1 tablet 2 times a day as needed for severe low back pain 30 tablet 0    levothyroxine 125 mcg tablet TAKE 1 TABLET BY MOUTH EVERY DAY 90 tablet 1    metaxalone (SKELAXIN) 800 mg tablet Take 1 tablet (800 mg total) by mouth 2 (two) times a day as needed for muscle spasms 60 tablet 2    methylPREDNISolone 4 MG tablet therapy pack Use as directed on package 21 each 0    naloxone (NARCAN) 4 mg/0 1 mL nasal spray Administer 1 spray into a nostril  If no response after 2-3 minutes, give another dose in the other nostril using a new spray   1 each 1    nebivolol (BYSTOLIC) 10 mg tablet TAKE 1 TABLET BY MOUTH EVERY DAY 90 tablet 3    omeprazole (PriLOSEC) 20 mg delayed release capsule omeprazole 20 mg capsule,delayed release      pantoprazole (PROTONIX) 40 mg tablet TAKE 1 TABLET BY MOUTH DAILY AS NEEDED 30 tablet 6    sertraline (ZOLOFT) 100 mg tablet TAKE 2 TABLETS BY MOUTH EVERY  tablet 3    tiZANidine (ZANAFLEX) 4 mg tablet TAKE 2 TABLETS BY MOUTH AT BEDTIME AS NEEDED FOR MUSCLE SPASMS 180 tablet 1     Current Facility-Administered Medications on File Prior to Visit   Medication Dose Route Frequency Provider Last Rate Last Admin    cyanocobalamin injection 1,000 mcg  1,000 mcg Intramuscular Q30 Days Danilo Lopes MD   1,000 mcg at 02/21/18 1534    cyanocobalamin injection 1,000 mcg  1,000 mcg Intramuscular Q30 Days Danilo Lopes MD   1,000 mcg at 03/29/18 0717    cyanocobalamin injection 1,000 mcg  1,000 mcg Intramuscular Q30 Days Danilo Lopes MD   1,000 mcg at 04/30/18 1737    cyanocobalamin injection 1,000 mcg  1,000 mcg Intramuscular Z89 Days Ronen Wooten MD   3,068 mcg at 08/10/18 1017    cyanocobalamin injection 1,000 mcg  1,000 mcg Intramuscular Q30 Days Danilo Lopes MD   1,000 mcg at 09/10/18 1127    cyanocobalamin injection 1,000 mcg  1,000 mcg Intramuscular Q30 Days Danilo Lopes MD   1,000 mcg at 02/11/19 0958    cyanocobalamin injection 1,000 mcg  1,000 mcg Intramuscular Q30 Days Danilo Lopes MD   1,000 mcg at 09/23/19 1739    cyanocobalamin injection 1,000 mcg  1,000 mcg Intramuscular Q30 Days Danilo Lopes MD   1,000 mcg at 10/29/19 2010    cyanocobalamin injection 1,000 mcg  1,000 mcg Intramuscular Q30 Days Mk Sharpe MD   1,000 mcg at 12/06/19 1513    cyanocobalamin injection 1,000 mcg  1,000 mcg Intramuscular Q30 Days Danilo Lopes MD   1,000 mcg at 07/02/20 1412    cyanocobalamin injection 1,000 mcg  1,000 mcg Intramuscular Q30 Days Danilo Lopes MD   1,000 mcg at 06/01/21 1100     Allergies   Allergen Reactions    Cephalosporins Other (See Comments)     GI Upset (vomit/diarrhea)    Medical Tape Rash    Metformin      Other reaction(s): Nausea and/or vomiting    Nabumetone GI Intolerance    Other GI Intolerance    Anesthesia S-I-40 [Propofol]     Celecoxib     Cephalexin     Choline Magnesium Trisalicylate     Diclofenac     Diclofenac Potassium(Migraine) GI Intolerance    Diclofenac Sodium GI Intolerance    Fentanyl     Propoxyphene Other (See Comments)     unknown    Sulfa Antibiotics     Sulfacetamide     Zolpidem        Objective   Vitals: Blood pressure 147/72, pulse 78, height 5' (1 524 m), weight 69 9 kg (154 lb 3 2 oz), not currently breastfeeding  ,Body mass index is 30 12 kg/m²  PE:  AAOx 3  WDWN  Hearing intact, no drainage from eyes  Regular rate  no audible wheezing  no abdominal distension  LE compartments soft, skin intact    left hip:   No dislocation/deformity  Neg  [place]  ROM: FF: 0-120 without pain, ER: 0-20 with pain, IR: 0-45  + Samir Test  Neg  Impingement test  TTP over greater trochanter  Abduction: 5/5  Neg  Leroy's test, ITB tight to palpation  TTP over SIJ    RLE:  EHL/AT/GS/quads/hamstrings/iliopsoas 5/5, sensation grossly intact L4, L5, S1, palpable pedal pulse  LLE:  EHL/AT/GS/iliopsoas 5/5, quads/hamstrings 4/5- per patient this has been present for years, sensation grossly intact L4, L5, S1, palpable pedal pulse    Back:    No TTP over lumbar spinous processes, paraspinal musculature  SLR: Neg       Imaging Studies: I have personally reviewed pertinent films in PACS   XR lefthip:  Moderate DJD with joint space narrowing

## 2022-09-13 ENCOUNTER — CLINICAL SUPPORT (OUTPATIENT)
Dept: FAMILY MEDICINE CLINIC | Facility: CLINIC | Age: 67
End: 2022-09-13
Payer: MEDICARE

## 2022-09-13 VITALS — TEMPERATURE: 97.3 F

## 2022-09-13 DIAGNOSIS — E53.8 VITAMIN B 12 DEFICIENCY: Primary | ICD-10-CM

## 2022-09-13 PROCEDURE — 96372 THER/PROPH/DIAG INJ SC/IM: CPT | Performed by: FAMILY MEDICINE

## 2022-09-13 RX ORDER — CYANOCOBALAMIN 1000 UG/ML
1000 INJECTION, SOLUTION INTRAMUSCULAR; SUBCUTANEOUS ONCE
Status: COMPLETED | OUTPATIENT
Start: 2022-09-13 | End: 2022-09-13

## 2022-09-13 RX ADMIN — CYANOCOBALAMIN 1000 MCG: 1000 INJECTION, SOLUTION INTRAMUSCULAR; SUBCUTANEOUS at 11:05

## 2022-09-30 DIAGNOSIS — F41.1 GAD (GENERALIZED ANXIETY DISORDER): ICD-10-CM

## 2022-10-01 RX ORDER — CLONAZEPAM 1 MG/1
TABLET ORAL
Qty: 30 TABLET | Refills: 5 | Status: SHIPPED | OUTPATIENT
Start: 2022-10-01

## 2022-10-07 ENCOUNTER — HOSPITAL ENCOUNTER (OUTPATIENT)
Dept: RADIOLOGY | Facility: MEDICAL CENTER | Age: 67
End: 2022-10-07
Payer: MEDICARE

## 2022-10-07 VITALS
DIASTOLIC BLOOD PRESSURE: 73 MMHG | SYSTOLIC BLOOD PRESSURE: 134 MMHG | TEMPERATURE: 97.4 F | RESPIRATION RATE: 20 BRPM | HEART RATE: 64 BPM | OXYGEN SATURATION: 95 %

## 2022-10-07 DIAGNOSIS — M51.36 DDD (DEGENERATIVE DISC DISEASE), LUMBAR: ICD-10-CM

## 2022-10-07 DIAGNOSIS — M46.1 SACROILIITIS, NOT ELSEWHERE CLASSIFIED (HCC): ICD-10-CM

## 2022-10-07 PROCEDURE — 27096 INJECT SACROILIAC JOINT: CPT | Performed by: PHYSICAL MEDICINE & REHABILITATION

## 2022-10-07 RX ORDER — HYDROCODONE BITARTRATE AND ACETAMINOPHEN 5; 325 MG/1; MG/1
TABLET ORAL
Qty: 30 TABLET | Refills: 0 | Status: SHIPPED | OUTPATIENT
Start: 2022-10-07

## 2022-10-07 RX ORDER — BUPIVACAINE HCL/PF 2.5 MG/ML
1.5 VIAL (ML) INJECTION ONCE
Status: COMPLETED | OUTPATIENT
Start: 2022-10-07 | End: 2022-10-07

## 2022-10-07 RX ORDER — METHYLPREDNISOLONE ACETATE 40 MG/ML
40 INJECTION, SUSPENSION INTRA-ARTICULAR; INTRALESIONAL; INTRAMUSCULAR; PARENTERAL; SOFT TISSUE ONCE
Status: COMPLETED | OUTPATIENT
Start: 2022-10-07 | End: 2022-10-07

## 2022-10-07 RX ADMIN — IOHEXOL 0.5 ML: 300 INJECTION, SOLUTION INTRAVENOUS at 11:33

## 2022-10-07 RX ADMIN — Medication 1.5 ML: at 11:33

## 2022-10-07 RX ADMIN — METHYLPREDNISOLONE ACETATE 40 MG: 40 INJECTION, SUSPENSION INTRA-ARTICULAR; INTRALESIONAL; INTRAMUSCULAR; PARENTERAL; SOFT TISSUE at 11:33

## 2022-10-07 NOTE — H&P
History of Present Illness:  The patient is a 75 y o  female who presents with complaints of left low back pain    Past Medical History:   Diagnosis Date    Anemia     Iron and B-12 anemia    Anxiety and depression     Bleeding ulcer     Disease of thyroid gland     GERD (gastroesophageal reflux disease)     Groin pain, right 2/3/2021    Scoliosis     Spinal meningi[address] ([facility] [address] )        Past Surgical History:   Procedure Laterality Date    SPINAL CORD STIMULATOR IMPLANT      SPINE SURGERY  1996     [facility]         Current Outpatient Medications:     amLODIPine (NORVASC) 5 mg tablet, TAKE 1 TABLET BY MOUTH EVERY DAY, Disp: 90 tablet, Rfl: 1    atorvastatin (LIPITOR) 40 mg tablet, TAKE 1 TABLET BY MOUTH 4 DAYS PER WEEK, Disp: 54 tablet, Rfl: 1    Calcium Carbonate-Vitamin D (CALCIUM CREAMIES PO), Take 600 mg by mouth daily, Disp: , Rfl:     Cholecalciferol 25 MCG (1000 UT) capsule, Take 2,000 Units by mouth, Disp: , Rfl:     clonazePAM (KlonoPIN) 1 mg tablet, TAKE HALF A TABLET (0 5 MG) TWICE A DAY AS NEEDED FOR ANXIETY, Disp: 30 tablet, Rfl: 5    Cyanocobalamin 1000 MCG/ML LIQD, cyanocobalamin (vit B-12) 1,000 mcg/mL injection solution, Disp: , Rfl:     dicyclomine (BENTYL) 20 mg tablet, TAKE 1 TABLET BY MOUTH 2 TIMES A DAY AS NEEDED (ABDOMINAL BLOATING/PAIN), Disp: 180 tablet, Rfl: 0    ferrous sulfate 325 (65 Fe) mg tablet, Take by oral route , Disp: , Rfl:     HYDROcodone-acetaminophen (NORCO) 5-325 mg per tablet, Take 1 tablet 2 times a day as needed for severe low back pain, Disp: 30 tablet, Rfl: 0    levothyroxine 125 mcg tablet, TAKE 1 TABLET BY MOUTH EVERY DAY, Disp: 90 tablet, Rfl: 1    metaxalone (SKELAXIN) 800 mg tablet, Take 1 tablet (800 mg total) by mouth 2 (two) times a day as needed for muscle spasms, Disp: 60 tablet, Rfl: 2    methylPREDNISolone 4 MG tablet therapy pack, Use as directed on package, Disp: 21 each, Rfl: 0    naloxone (NARCAN) 4 mg/0 1 mL nasal spray, Administer 1 spray into a nostril   If no response after 2-3 minutes, give another dose in the other nostril using a new spray , Disp: 1 each, Rfl: 1    nebivolol (BYSTOLIC) 10 mg tablet, TAKE 1 TABLET BY MOUTH EVERY DAY, Disp: 90 tablet, Rfl: 3    omeprazole (PriLOSEC) 20 mg delayed release capsule, omeprazole 20 mg capsule,delayed release, Disp: , Rfl:     pantoprazole (PROTONIX) 40 mg tablet, TAKE 1 TABLET BY MOUTH DAILY AS NEEDED, Disp: 30 tablet, Rfl: 6    sertraline (ZOLOFT) 100 mg tablet, TAKE 2 TABLETS BY MOUTH EVERY DAY, Disp: 180 tablet, Rfl: 3    tiZANidine (ZANAFLEX) 4 mg tablet, TAKE 2 TABLETS BY MOUTH AT BEDTIME AS NEEDED FOR MUSCLE SPASMS, Disp: 180 tablet, Rfl: 1    Current Facility-Administered Medications:     cyanocobalamin injection 1,000 mcg, 1,000 mcg, Intramuscular, Q30 Days, Miranda Calderon MD, 1,000 mcg at 02/21/18 1534    cyanocobalamin injection 1,000 mcg, 1,000 mcg, Intramuscular, Q30 Days, Miranda Calderon MD, 1,000 mcg at 03/29/18 0365    cyanocobalamin injection 1,000 mcg, 1,000 mcg, Intramuscular, Q30 Days, Miranda Calderon MD, 1,000 mcg at 04/30/18 1737    cyanocobalamin injection 1,000 mcg, 1,000 mcg, Intramuscular, F74 Days, Lilian Forrest MD, 1,609 mcg at 08/10/18 1017    cyanocobalamin injection 1,000 mcg, 1,000 mcg, Intramuscular, Q30 Days, Miranda Calderon MD, 1,000 mcg at 09/10/18 1127    cyanocobalamin injection 1,000 mcg, 1,000 mcg, Intramuscular, Q30 Days, Miranda Calderon MD, 1,000 mcg at 02/11/19 0958    cyanocobalamin injection 1,000 mcg, 1,000 mcg, Intramuscular, Q30 Days, Miranda Calderon MD, 1,000 mcg at 09/23/19 1739    cyanocobalamin injection 1,000 mcg, 1,000 mcg, Intramuscular, Q30 Days, Miranda Calderon MD, 1,000 mcg at 10/29/19 2010    cyanocobalamin injection 1,000 mcg, 1,000 mcg, Intramuscular, Q30 Days, Franklin Stringer MD, 1,000 mcg at 12/06/19 1513    cyanocobalamin injection 1,000 mcg, 1,000 mcg, Intramuscular, Q30 Days, Shelbie Palacio MD, 1,000 mcg at 07/02/20 1412    cyanocobalamin injection 1,000 mcg, 1,000 mcg, Intramuscular, Q30 Days, Shelbie Palacio MD, 1,000 mcg at 06/01/21 1100    Allergies   Allergen Reactions    Cephalosporins Other (See Comments)     GI Upset (vomit/diarrhea)    Medical Tape Rash    Metformin      Other reaction(s): Nausea and/or vomiting    Nabumetone GI Intolerance    Other GI Intolerance    Anesthesia S-I-40 [Propofol]     Celecoxib     Cephalexin     Choline Magnesium Trisalicylate     Diclofenac     Diclofenac Potassium(Migraine) GI Intolerance    Diclofenac Sodium GI Intolerance    Fentanyl     Propoxyphene Other (See Comments)     unknown    Sulfa Antibiotics     Sulfacetamide     Zolpidem        Physical Exam:   Vitals:    10/07/22 1110   BP: 131/81   Pulse: 64   Resp: 20   Temp: (!) 97 4 °F (36 3 °C)   SpO2: 94%     General: Awake, Alert, Oriented x 3, Mood and affect appropriate  Respiratory: Respirations even and unlabored  Cardiovascular: Peripheral pulses intact; no edema  Musculoskeletal Exam: left low back pain    ASA Score: 2    Patient/Chart Verification  Patient ID Verified: Verbal  ID Band Applied: No  Consents Confirmed: Procedural, To be obtained in the Pre-Procedure area  H&P( within 30 days) Verified: To be obtained in the Pre-Procedure area  Interval H&P(within 24 hr) Complete (required for Outpatients and Surgery Admit only): To be obtained in the Pre-Procedure area  Allergies Reviewed: Yes  Anticoag/NSAID held?: NA  Currently on antibiotics?: No    Assessment:   1   Sacroili[facility], not elsewhere classified ([facility]        Plan: LT SIJ INJ (Evan) [id number]

## 2022-10-07 NOTE — DISCHARGE INSTRUCTIONS
Steroid Joint Injection   WHAT YOU NEED TO KNOW:   A steroid joint injection is a procedure to inject steroid medicine into a joint  Steroid medicine decreases pain and inflammation  The injection may also contain an anesthetic (numbing medicine) to decrease pain  It may be done to treat conditions such as arthritis, gout, or carpal tunnel syndrome  The injections may be given in your knee, ankle, shoulder, elbow, wrist, ankle or sacroiliac joint  Do not apply heat to any area that is numb  If you have discomfort or soreness at the injection site, you may apply ice today, 20 minutes on and 20 minutes off  Tomorrow you may use ice or warm, moist heat  Do not apply ice or heat directly to the skin  You may have an increase or change in the discomfort for 36-48 hours after your treatment  Apply ice and continue with any pain medicine you have been prescribed  Do not do anything strenuous today  You may shower, but no tub baths or hot tubs today  You may resume your normal activities tomorrow, but do not overdo it  Resume normal activities slowly when you are feeling better  If you experience redness, drainage or swelling at the injection site, or if you develop a fever above 100 degrees, please call [facility] at [phone] or go to the Emergency Room  Continue to take all routine medicines prescribed by your primary care physician unless otherwise instructed by our staff  Most blood thinners should be started again according to your regularly scheduled dosing  If you have any questions, please give our office a call  As no general anesthesia was used in today's procedure, you should not experience any side effects related to anesthesia  If you are diabetic, the steroids used in today's injection may temporarily increase your blood sugar levels after the first few days after your injection   Please keep a close eye on your sugars and alert the doctor who manages your diabetes if your sugars are significantly high from your baseline or you are symptomatic  If you have a problem specifically related to your procedure, please call our office at [phone]  Problems not related to your procedure should be directed to your primary care physician

## 2022-10-14 ENCOUNTER — TELEPHONE (OUTPATIENT)
Dept: PAIN MEDICINE | Facility: CLINIC | Age: 67
End: 2022-10-14

## 2022-10-18 NOTE — TELEPHONE ENCOUNTER
Caller: patient   Doctor/office: Edy Thakkar  #: [phone]     % of improvement: 90  Pain Scale (1-10): 2/10

## 2022-10-26 DIAGNOSIS — K58.0 IRRITABLE BOWEL SYNDROME WITH DIARRHEA: ICD-10-CM

## 2022-10-26 RX ORDER — DICYCLOMINE HCL 20 MG
TABLET ORAL
Qty: 60 TABLET | Refills: 2 | Status: SHIPPED | OUTPATIENT
Start: 2022-10-26

## 2022-10-28 ENCOUNTER — CLINICAL SUPPORT (OUTPATIENT)
Dept: FAMILY MEDICINE CLINIC | Facility: CLINIC | Age: 67
End: 2022-10-28

## 2022-10-28 DIAGNOSIS — E53.8 VITAMIN B 12 DEFICIENCY: ICD-10-CM

## 2022-10-28 DIAGNOSIS — Z23 NEED FOR INFLUENZA VACCINATION: Primary | ICD-10-CM

## 2022-10-28 RX ORDER — CYANOCOBALAMIN 1000 UG/ML
1000 INJECTION, SOLUTION INTRAMUSCULAR; SUBCUTANEOUS ONCE
Status: COMPLETED | OUTPATIENT
Start: 2022-10-28 | End: 2022-10-28

## 2022-10-28 RX ADMIN — CYANOCOBALAMIN 1000 MCG: 1000 INJECTION, SOLUTION INTRAMUSCULAR; SUBCUTANEOUS at 13:59

## 2022-11-11 ENCOUNTER — OFFICE VISIT (OUTPATIENT)
Dept: OBGYN CLINIC | Facility: MEDICAL CENTER | Age: 67
End: 2022-11-11

## 2022-11-11 VITALS
BODY MASS INDEX: 30.63 KG/M2 | SYSTOLIC BLOOD PRESSURE: 117 MMHG | DIASTOLIC BLOOD PRESSURE: 71 MMHG | HEIGHT: 60 IN | WEIGHT: 156 LBS | HEART RATE: 62 BPM

## 2022-11-11 DIAGNOSIS — M16.12 PRIMARY OSTEOARTHRITIS OF LEFT HIP: ICD-10-CM

## 2022-11-11 DIAGNOSIS — M76.32 ILIOTIBIAL BAND SYNDROME OF LEFT SIDE: ICD-10-CM

## 2022-11-11 DIAGNOSIS — M70.62 GREATER TROCHANTERIC BURSITIS OF LEFT HIP: Primary | ICD-10-CM

## 2022-11-11 RX ORDER — BUPIVACAINE HYDROCHLORIDE 5 MG/ML
1 INJECTION, SOLUTION PERINEURAL
Status: COMPLETED | OUTPATIENT
Start: 2022-11-11 | End: 2022-11-11

## 2022-11-11 RX ORDER — TRIAMCINOLONE ACETONIDE 40 MG/ML
40 INJECTION, SUSPENSION INTRA-ARTICULAR; INTRAMUSCULAR
Status: COMPLETED | OUTPATIENT
Start: 2022-11-11 | End: 2022-11-11

## 2022-11-11 RX ADMIN — BUPIVACAINE HYDROCHLORIDE 1 ML: 5 INJECTION, SOLUTION PERINEURAL at 13:37

## 2022-11-11 RX ADMIN — TRIAMCINOLONE ACETONIDE 40 MG: 40 INJECTION, SUSPENSION INTRA-ARTICULAR; INTRAMUSCULAR at 13:37

## 2022-11-11 NOTE — PROGRESS NOTES
Assessment/Plan     1  Greater trochanteric bursitis of left hip    2  Primary osteoarthritis of left hip    3  Iliotibial band syndrome of left side      Orders Placed This Encounter   Procedures   • Large joint arthrocentesis: L greater trochanteric bursa     Patient was given left trochanteric bursa cortisone injection today, tolerated well, cold compress today  Instructed to start physical therapy to rehab hip IT band, trochanteric bursa-as this is main course of treatment  She can use voltaren gel, aspercreme for pain, tylenol 1000mg TID a day  See patient back in 2 months  Return in about 8 weeks (around 1/6/2023) for Recheck  I answered all of the patient's questions during the visit and provided education of the patient's condition during the visit  The patient verbalized understanding of the information given and agrees with the plan  This note was dictated using [employer] software  It may contain errors including improperly dictated words  Please contact physician directly for any questions  Subjective   Chief Complaint:   Chief Complaint   Patient presents with   • Left Hip - Follow-up       Shanique Lira is a 75 y o  female who presents for follow up for her left hip trochanteric bursitis  She was given CSI troch bursa 9/9/22  She was also referred to pain management for lumbar radiculopathy, left SI joint dysfunction  She did see Dr Margareth Vizcaino in October and did have left SI joint injection  She did not get into physical therapy  She continues to note pain lateral hip region down IT band to mid thigh level  She also notes some left leg weakness has been present for years  Her left leg feels longer to her  She has a history of back surgery  Mccarty rods were placed in 1996 by Dr Honey Chambers at [facility]  She continues to have pain and these were removed in 2000 and without relief  Currently she ambulates with a cane  History of spinal cord stimulator but this stopped working years ago  She does use oxycodone for pain control  Review of Systems  See HPI for musculoskeletal review     All other systems reviewed are negative     History:  Past Medical History:   Diagnosis Date   • Anemia     Iron and B-12 anemia   • Anxiety and depression    • Bleeding ulcer    • Disease of thyroid gland    • GERD (gastroesophageal reflux disease)    • Groin pain, right 2/3/2021   • Scoliosis    • Spinal meningioma [facility])      Past Surgical History:   Procedure Laterality Date   • SPINAL CORD STIMULATOR IMPLANT     • SPINE SURGERY  1996     [facility]     Social History   Social History     Substance and Sexual Activity   Alcohol Use Yes    Comment: rare     Social History     Substance and Sexual Activity   Drug Use No     Social History     Tobacco Use   Smoking Status Never Smoker   Smokeless Tobacco Never Used     Family History:   Family History   Problem Relation Age of Onset   • Heart disease Mother    • Skin cancer Mother         unsure of type   • Esophageal cancer Father    • Kidney cancer Father    • Thyroid disease Father    • Diabetes Son    • Skin cancer Sister         unsure of type   • Cervical cancer Maternal Grandmother    • No Known Problems Maternal Grandfather    • No Known Problems Paternal Grandmother    • No Known Problems Paternal Grandfather    • No Known Problems Sister    • Skin cancer Maternal Aunt         unsure of type   • No Known Problems Paternal Aunt    • No Known Problems Paternal Aunt        Meds/Allergies   (Not in a hospital admission)    Allergies   Allergen Reactions   • Cephalosporins Other (See Comments)     GI Upset (vomit/diarrhea)   • Medical Tape Rash   • Metformin      Other reaction(s): Nausea and/or vomiting   • Nabumetone GI Intolerance   • Other GI Intolerance   • Anesthesia S-I-40 [Propofol]    • Celecoxib    • Cephalexin    • Choline Magnesium Trisalicylate    • Diclofenac    • Diclofenac Potassium(Migraine) GI Intolerance   • Diclofenac Sodium GI Intolerance   • Fentanyl    • Propoxyphene Other (See Comments)     unknown   • Sulfa Antibiotics    • Sulfacetamide    • Zolpidem           Objective     /71   Pulse 62   Ht 5' (1 524 m)   Wt 70 8 kg (156 lb)   BMI 30 47 kg/m²      PE:  AAOx 3  WDWN  Hearing intact, no drainage from eyes  no audible wheezing  no abdominal distension  LE compartments soft, skin intact    Ortho Exam:  left hip:   No dislocation/deformity  ROM: 0-120 FE, ER 0-20 IR, ER 0-45   TTP over greater trochanter and IT band  No TTP over SIJ  Neg  Samir test  Positivve  Leroy's test  Abduction 5/5  AT/GS intact    Imaging Studies: no new imaging to review      Large joint arthrocentesis: L greater trochanteric bursa  Universal Protocol:  Consent: Verbal consent obtained  Risks and benefits: risks, benefits and alternatives were discussed  Consent given by: patient  Time out: Immediately prior to procedure a "time out" was called to verify the correct patient, procedure, equipment, support staff and site/side marked as required    Patient understanding: patient states understanding of the procedure being performed  Site marked: the operative site was marked  Patient identity confirmed: verbally with patient    Supporting Documentation  Indications: pain   Procedure Details  Location: hip - L greater trochanteric bursa  Preparation: Patient was prepped and draped in the usual sterile fashion  Needle size: 22 G  Ultrasound guidance: no  Approach: lateral  Medications administered: 1 mL bupivacaine 0 5 %; 40 mg triamcinolone acetonide 40 mg/mL    Patient tolerance: patient tolerated the procedure well with no immediate complications  Dressing:  Sterile dressing applied            Scribe Attestation    I,:  Srikanth Lozano am acting as a scribe while in the presence of the attending physician :       I,:  Lucas Magana DO personally performed the services described in this documentation    as scribed in my presence :

## 2022-11-15 ENCOUNTER — OFFICE VISIT (OUTPATIENT)
Dept: FAMILY MEDICINE CLINIC | Facility: CLINIC | Age: 67
End: 2022-11-15

## 2022-11-15 VITALS
HEIGHT: 60 IN | OXYGEN SATURATION: 97 % | RESPIRATION RATE: 16 BRPM | TEMPERATURE: 98 F | WEIGHT: 150.6 LBS | DIASTOLIC BLOOD PRESSURE: 70 MMHG | BODY MASS INDEX: 29.57 KG/M2 | HEART RATE: 59 BPM | SYSTOLIC BLOOD PRESSURE: 120 MMHG

## 2022-11-15 DIAGNOSIS — F11.20 CONTINUOUS OPIOID DEPENDENCE (HCC): ICD-10-CM

## 2022-11-15 DIAGNOSIS — K21.9 GASTROESOPHAGEAL REFLUX DISEASE WITHOUT ESOPHAGITIS: ICD-10-CM

## 2022-11-15 DIAGNOSIS — E78.2 MIXED HYPERLIPIDEMIA: ICD-10-CM

## 2022-11-15 DIAGNOSIS — E03.9 HYPOTHYROIDISM, UNSPECIFIED TYPE: ICD-10-CM

## 2022-11-15 DIAGNOSIS — D50.8 IRON DEFICIENCY ANEMIA SECONDARY TO INADEQUATE DIETARY IRON INTAKE: ICD-10-CM

## 2022-11-15 DIAGNOSIS — M13.0 POLYARTICULAR ARTHRITIS: ICD-10-CM

## 2022-11-15 DIAGNOSIS — F41.1 GAD (GENERALIZED ANXIETY DISORDER): ICD-10-CM

## 2022-11-15 DIAGNOSIS — Z23 NEED FOR PNEUMOCOCCAL VACCINATION: ICD-10-CM

## 2022-11-15 DIAGNOSIS — I10 BENIGN ESSENTIAL HYPERTENSION: ICD-10-CM

## 2022-11-15 DIAGNOSIS — L30.9 ECZEMA OF BOTH HANDS: ICD-10-CM

## 2022-11-15 DIAGNOSIS — Z00.00 MEDICARE ANNUAL WELLNESS VISIT, SUBSEQUENT: Primary | ICD-10-CM

## 2022-11-15 DIAGNOSIS — E53.8 VITAMIN B 12 DEFICIENCY: ICD-10-CM

## 2022-11-15 DIAGNOSIS — I47.1 PSVT (PAROXYSMAL SUPRAVENTRICULAR TACHYCARDIA) (HCC): ICD-10-CM

## 2022-11-15 PROBLEM — Z86.16 PERSONAL HISTORY OF COVID-19: Status: RESOLVED | Noted: 2020-12-18 | Resolved: 2022-11-15

## 2022-11-15 RX ORDER — PANTOPRAZOLE SODIUM 40 MG/1
40 TABLET, DELAYED RELEASE ORAL DAILY
Qty: 30 TABLET | Refills: 6 | Status: SHIPPED | OUTPATIENT
Start: 2022-11-15

## 2022-11-15 NOTE — PATIENT INSTRUCTIONS
Medicare Preventive Visit Patient Instructions  Thank you for completing your Welcome to Medicare Visit or Medicare Annual Wellness Visit today  Your next wellness visit will be due in one year (11/16/2023)  The screening/preventive services that you may require over the next 5-10 years are detailed below  Some tests may not apply to you based off risk factors and/or age  Screening tests ordered at today's visit but not completed yet may show as past due  Also, please note that scanned in results may not display below  Preventive Screenings:  Service Recommendations Previous Testing/Comments   Colorectal Cancer Screening  * Colonoscopy    * Fecal Occult Blood Test (FOBT)/Fecal Immunochemical Test (FIT)  * Fecal DNA/Cologuard Test  * Flexible Sigmoidoscopy Age: 39-70 years old   Colonoscopy: every 10 years (may be performed more frequently if at higher risk)  OR  FOBT/FIT: every 1 year  OR  Cologuard: every 3 years  OR  Sigmoidoscopy: every 5 years  Screening may be recommended earlier than age 39 if at higher risk for colorectal cancer  Also, an individualized decision between you and your healthcare provider will decide whether screening between the ages of 74-80 would be appropriate  Colonoscopy: 08/29/2013  FOBT/FIT: Not on file  Cologuard: Not on file  Sigmoidoscopy: Not on file    Screening Current     Breast Cancer Screening Age: 36 years old  Frequency: every 1-2 years  Not required if history of left and right mastectomy Mammogram: 05/25/2021    Screening Current   Cervical Cancer Screening Between the ages of 21-29, pap smear recommended once every 3 years  Between the ages of 33-67, can perform pap smear with HPV co-testing every 5 years     Recommendations may differ for women with a history of total hysterectomy, cervical cancer, or abnormal pap smears in past  Pap Smear: 06/01/2022    Screening Not Indicated   Hepatitis C Screening Once for adults born between 1945 and 1965  More frequently in patients at high risk for Hepatitis C Hep C Antibody: 02/21/2017    Screening Current   Diabetes Screening 1-2 times per year if you're at risk for diabetes or have pre-diabetes Fasting glucose: 102 mg/dL (6/24/2022)  A1C: No results in last 5 years (No results in last 5 years)  Screening Current   Cholesterol Screening Once every 5 years if you don't have a lipid disorder  May order more often based on risk factors  Lipid panel: 06/24/2022    Screening Not Indicated  History Lipid Disorder     Other Preventive Screenings Covered by Medicare:  1  Abdominal Aortic Aneurysm (AAA) Screening: covered once if your at risk  You're considered to be at risk if you have a family history of AAA  2  Lung Cancer Screening: covers low dose CT scan once per year if you meet all of the following conditions: (1) Age 50-69; (2) No signs or symptoms of lung cancer; (3) Current smoker or have quit smoking within the last 15 years; (4) You have a tobacco smoking history of at least 20 pack years (packs per day multiplied by number of years you smoked); (5) You get a written order from a healthcare provider  3  Glaucoma Screening: covered annually if you're considered high risk: (1) You have diabetes OR (2) Family history of glaucoma OR (3)  aged 48 and older OR (3)  American aged 72 and older  3  Osteoporosis Screening: covered every 2 years if you meet one of the following conditions: (1) You're estrogen deficient and at risk for osteoporosis based off medical history and other findings; (2) Have a vertebral abnormality; (3) On glucocorticoid therapy for more than 3 months; (4) Have primary hyperparathyroidism; (5) On osteoporosis medications and need to assess response to drug therapy  · Last bone density test (DXA Scan): Not on file  5  HIV Screening: covered annually if you're between the age of 12-76   Also covered annually if you are younger than 13 and older than 72 with risk factors for HIV infection  For pregnant patients, it is covered up to 3 times per pregnancy  Immunizations:  Immunization Recommendations   Influenza Vaccine Annual influenza vaccination during flu season is recommended for all persons aged >= 6 months who do not have contraindications   Pneumococcal Vaccine   * Pneumococcal conjugate vaccine = PCV13 (Prevnar 13), PCV15 (Vaxneuvance), PCV20 (Prevnar 20)  * Pneumococcal polysaccharide vaccine = PPSV23 (Pneumovax) Adults 25-60 years old: 1-3 doses may be recommended based on certain risk factors  Adults 72 years old: 1-2 doses may be recommended based off what pneumonia vaccine you previously received   Hepatitis B Vaccine 3 dose series if at intermediate or high risk (ex: diabetes, end stage renal disease, liver disease)   Tetanus (Td) Vaccine - COST NOT COVERED BY MEDICARE PART B Following completion of primary series, a booster dose should be given every 10 years to maintain immunity against tetanus  Td may also be given as tetanus wound prophylaxis  Tdap Vaccine - COST NOT COVERED BY MEDICARE PART B Recommended at least once for all adults  For pregnant patients, recommended with each pregnancy  Shingles Vaccine (Shingrix) - COST NOT COVERED BY MEDICARE PART B  2 shot series recommended in those aged 48 and above     Health Maintenance Due:      Topic Date Due   • DXA SCAN  Never done   • Breast Cancer Screening: Mammogram  05/25/2023   • Colorectal Cancer Screening  08/28/2023   • Cervical Cancer Screening  06/30/2024   • Hepatitis C Screening  Completed     Immunizations Due:      Topic Date Due   • Pneumococcal Vaccine: 65+ Years (2 - PPSV23 or PCV20) 05/09/2020   • COVID-19 Vaccine (3 - Booster for Charna Paget series) 09/28/2021     Advance Directives   What are advance directives? Advance directives are legal documents that state your wishes and plans for medical care  These plans are made ahead of time in case you lose your ability to make decisions for yourself  Advance directives can apply to any medical decision, such as the treatments you want, and if you want to donate organs  What are the types of advance directives? There are many types of advance directives, and each state has rules about how to use them  You may choose a combination of any of the following:  · Living will: This is a written record of the treatment you want  You can also choose which treatments you do not want, which to limit, and which to stop at a certain time  This includes surgery, medicine, IV fluid, and tube feedings  · Durable power of  for healthcare [facility]): This is a written record that states who you want to make healthcare choices for you when you are unable to make them for yourself  This person, called a proxy, is usually a family member or a friend  You may choose more than 1 proxy  · Do not resuscitate (DNR) order:  A DNR order is used in case your heart stops beating or you stop breathing  It is a request not to have certain forms of treatment, such as CPR  A DNR order may be included in other types of advance directives  · Medical directive: This covers the care that you want if you are in a coma, near death, or unable to make decisions for yourself  You can list the treatments you want for each condition  Treatment may include pain medicine, surgery, blood transfusions, dialysis, IV or tube feedings, and a ventilator (breathing machine)  · Values history: This document has questions about your views, beliefs, and how you feel and think about life  This information can help others choose the care that you would choose  Why are advance directives important? An advance directive helps you control your care  Although spoken wishes may be used, it is better to have your wishes written down  Spoken wishes can be misunderstood, or not followed  Treatments may be given even if you do not want them   An advance directive may make it easier for your family to make difficult choices about your care  Fall Prevention    Fall prevention  includes ways to make your home and other areas safer  It also includes ways you can move more carefully to prevent a fall  Health conditions that cause changes in your blood pressure, vision, or muscle strength and coordination may increase your risk for falls  Medicines may also increase your risk for falls if they make you dizzy, weak, or sleepy  Fall prevention tips:   · Stand or sit up slowly  · Use assistive devices as directed  · Wear shoes that fit well and have soles that   · Wear a personal alarm  · Stay active  · Manage your medical conditions  Home Safety Tips:  · Add items to prevent falls in the bathroom  · Keep paths clear  · Install bright lights in your home  · Keep items you use often on shelves within reach  · Paint or place reflective tape on the edges of your stairs  Urinary Incontinence   Urinary incontinence (UI)  is when you lose control of your bladder  UI develops because your bladder cannot store or empty urine properly  The 3 most common types of UI are stress incontinence, urge incontinence, or both  Medicines:   · May be given to help strengthen your bladder control  Report any side effects of medication to your healthcare provider  Do pelvic muscle exercises often:  Your pelvic muscles help you stop urinating  Squeeze these muscles tight for 5 seconds, then relax for 5 seconds  Gradually work up to squeezing for 10 seconds  Do 3 sets of 15 repetitions a day, or as directed  This will help strengthen your pelvic muscles and improve bladder control  Train your bladder:  Go to the bathroom at set times, such as every 2 hours, even if you do not feel the urge to go  You can also try to hold your urine when you feel the urge to go  For example, hold your urine for 5 minutes when you feel the urge to go  As that becomes easier, hold your urine for 10 minutes     Self-care: · Keep a UI record  Write down how often you leak urine and how much you leak  Make a note of what you were doing when you leaked urine  · Drink liquids as directed  You may need to limit the amount of liquid you drink to help control your urine leakage  Do not drink any liquid right before you go to bed  Limit or do not have drinks that contain caffeine or alcohol  · Prevent constipation  Eat a variety of high-fiber foods  Good examples are high-fiber cereals, beans, vegetables, and whole-grain breads  Walking is the best way to trigger your intestines to have a bowel movement  · Exercise regularly and maintain a healthy weight  Weight loss and exercise will decrease pressure on your bladder and help you control your leakage  · Use a catheter as directed  to help empty your bladder  A catheter is a tiny, plastic tube that is put into your bladder to drain your urine  · Go to behavior therapy as directed  Behavior therapy may be used to help you learn to control your urge to urinate  Weight Management   Why it is important to manage your weight:  Being overweight increases your risk of health conditions such as heart disease, high blood pressure, type 2 diabetes, and certain types of cancer  It can also increase your risk for osteoarthritis, sleep apnea, and other respiratory problems  Aim for a slow, steady weight loss  Even a small amount of weight loss can lower your risk of health problems  How to lose weight safely:  A safe and healthy way to lose weight is to eat fewer calories and get regular exercise  You can lose up about 1 pound a week by decreasing the number of calories you eat by 500 calories each day  Healthy meal plan for weight management:  A healthy meal plan includes a variety of foods, contains fewer calories, and helps you stay healthy  A healthy meal plan includes the following:  · Eat whole-grain foods more often  A healthy meal plan should contain fiber   Fiber is the part of grains, fruits, and vegetables that is not broken down by your body  Whole-grain foods are healthy and provide extra fiber in your diet  Some examples of whole-grain foods are whole-wheat breads and pastas, oatmeal, brown rice, and bulgur  · Eat a variety of vegetables every day  Include dark, leafy greens such as spinach, kale, dima greens, and mustard greens  Eat yellow and orange vegetables such as carrots, sweet potatoes, and winter squash  · Eat a variety of fruits every day  Choose fresh or canned fruit (canned in its own juice or light syrup) instead of juice  Fruit juice has very little or no fiber  · Eat low-fat dairy foods  Drink fat-free (skim) milk or 1% milk  Eat fat-free yogurt and low-fat cottage cheese  Try low-fat cheeses such as mozzarella and other reduced-fat cheeses  · Choose meat and other protein foods that are low in fat  Choose beans or other legumes such as split peas or lentils  Choose fish, skinless poultry (chicken or turkey), or lean cuts of red meat (beef or pork)  Before you cook meat or poultry, cut off any visible fat  · Use less fat and oil  Try baking foods instead of frying them  Add less fat, such as margarine, sour cream, regular salad dressing and mayonnaise to foods  Eat fewer high-fat foods  Some examples of high-fat foods include french fries, doughnuts, ice cream, and cakes  · Eat fewer sweets  Limit foods and drinks that are high in sugar  This includes candy, cookies, regular soda, and sweetened drinks  Exercise:  Exercise at least 30 minutes per day on most days of the week  Some examples of exercise include walking, biking, dancing, and swimming  You can also fit in more physical activity by taking the stairs instead of the elevator or parking farther away from stores  Ask your healthcare provider about the best exercise plan for you     Narcotic (Opioid) Safety    Use narcotics safely:  · Take prescribed narcotics exactly as directed  · Do not give narcotics to others or take narcotics that belong to someone else  · Do not mix narcotics without medicines or alcohol  · Do not drive or operate heavy machinery after you take the narcotic  · Monitor for side effects and notify your healthcare provider if you experienced side effects such as nausea, sleepiness, itching, or trouble thinking clearly  Manage constipation:    Constipation is the most common side effect of narcotic medicine  Constipation is when you have hard, dry bowel movements, or you go longer than usual between bowel movements  Tell your healthcare provider about all changes in your bowel movements while you are taking narcotics  He or she may recommend laxative medicine to help you have a bowel movement  He or she may also change the kind of narcotic you are taking, or change when you take it  The following are more ways you can prevent or relieve constipation:    · Drink liquids as directed  You may need to drink extra liquids to help soften and move your bowels  Ask how much liquid to drink each day and which liquids are best for you  · Eat high-fiber foods  This may help decrease constipation by adding bulk to your bowel movements  High-fiber foods include fruits, vegetables, whole-grain breads and cereals, and beans  Your healthcare provider or dietitian can help you create a high-fiber meal plan  Your provider may also recommend a fiber supplement if you cannot get enough fiber from food  · Exercise regularly  Regular physical activity can help stimulate your intestines  Walking is a good exercise to prevent or relieve constipation  Ask which exercises are best for you  · Schedule a time each day to have a bowel movement  This may help train your body to have regular bowel movements  Bend forward while you are on the toilet to help move the bowel movement out  Sit on the toilet for at least 10 minutes, even if you do not have a bowel movement      Store narcotics safely:   · Store narcotics where others cannot easily get them  Keep them in a locked cabinet or secure area  Do not  keep them in a purse or other bag you carry with you  A person may be looking for something else and find the narcotics  · Make sure narcotics are stored out of the reach of children  A child can easily overdose on narcotics  Narcotics may look like candy to a small child  The best way to dispose of narcotics: The laws vary by country and area  In the [place], the best way is to return the narcotics through a take-back program  This program is offered by the [employer])  The following are options for using the program:  · Take the narcotics to a [facility] collection site  The site is often a law enforcement center  Call your local law enforcement center for scheduled take-back days in your area  You will be given information on where to go if the collection site is in a different location  · Take the narcotics to an approved pharmacy or hospital   A pharmacy or hospital may be set up as a collection site  You will need to ask if it is a [facility] collection site if you were not directed there  A pharmacy or doctor's office may not be able to take back narcotics unless it is a [facility] site  · Use a mail-back system  This means you are given containers to put the narcotics into  You will then mail them in the containers  · Use a take-back drop box  This is a place to leave the narcotics at any time  People and animals will not be able to get into the box  Your local law enforcement agency can tell you where to find a drop box in your area  Other ways to manage pain:   · Ask your healthcare provider about non-narcotic medicines to control pain  Nonprescription medicines include NSAIDs (such as ibuprofen) and acetaminophen  Prescription medicines include muscle relaxers, antidepressants, and steroids  · Pain may be managed without any medicines    Some ways to relieve pain include massage, aromatherapy, or meditation  Physical or occupational therapy may also help  For more information:   · Drug Enforcement Administration  [address] [facility] [address]  Phone: 4- 671 - 654-9121  Web Address: [email or web address]_[email or web address]    · [address] and Drug Administration  Egnar Piedad  [place] , [address]  Phone: 8- 716 - 861-7464  Web Address: http://[employer]/     © Copyright [employer] 2018 Information is for End User's use only and may not be sold, redistributed or otherwise used for commercial purposes   All illustrations and images included in CareNotes® are the copyrighted property of A D A M , Inc  or [address] [place], [place] [address]leon

## 2022-11-15 NOTE — PROGRESS NOTES
Assessment and Plan:     Problem List Items Addressed This Visit        Digestive    Gastroesophageal reflux disease without esophagitis     Continue pantoprazole, symptoms have been well controlled  Relevant Medications    pantoprazole (PROTONIX) 40 mg tablet       Endocrine    Hypothyroidism     Continue levothyroxine 125 mcg daily, follow TSH         Relevant Orders    TSH, 3rd generation       Cardiovascular and Mediastinum    Benign essential hypertension     Blood pressure is well controlled  Continue amlodipine 5 mg daily, Bystolic 10 mg daily         PSVT (paroxysmal supraventricular tachycardia) (HCC)     Patient is asymptomatic  Continue Bystolic 10 mg daily  Musculoskeletal and Integument    Polyarticular arthritis     Patient had connective tissue disease workup and consultation by Rheumatology  Diagnosed with osteoarthritis  Follow-up with Rheumatology PRN            Other    Vitamin B 12 deficiency (Chronic)     Vitamin B12 injections every month, monitor levels         Relevant Orders    Vitamin B12    Iron deficiency anemia secondary to inadequate dietary iron intake     Continue daily iron supplement         Relevant Orders    CBC and differential    Iron, TIBC and Ferritin Panel    Mixed hyperlipidemia     Continue atorvastatin, proceed with blood work         Relevant Orders    Comprehensive metabolic panel    Lipid Panel with Direct LDL reflex    KERON (generalized anxiety disorder)     Stable control  Continue Zoloft 200 mg daily along with p r n  Klonopin         Continuous opioid dependence ([facility] [address] )     Patient uses Vicodin infrequently, as needed, sparingly, no history of misuse             Other Visit Diagnoses     Medicare annual wellness visit, subsequent    -  Primary    Eczema of both hands        Relevant Medications    fluocinonide (LIDEX) 0 05 % cream    Need for pneumococcal vaccination        Relevant Orders    Pneumococcal Conjugate Vaccine 20-valent (Pcv20) (Completed)        Patient presents for follow-up/Medicare wellness  I reminded her to proceed with bilateral diagnostic mammogram is ordered by gynecologist   Patient is also due for bone density scan  Patient is up-to-date with flu vaccine  Prevnar 20 was administered today  Follow-up 6 months and pending labs  BMI Counseling: Body mass index is 29 41 kg/m²  The BMI is above normal  Nutrition recommendations include encouraging healthy choices of fruits and vegetables, consuming healthier snacks, moderation in carbohydrate intake, reducing intake of saturated and trans fat and reducing intake of cholesterol  Exercise recommendations include exercising 3-5 times per week  No pharmacotherapy was ordered  Patient referred to PCP  Rationale for BMI follow-up plan is due to patient being overweight or obese  Preventive health issues were discussed with patient, and age appropriate screening tests were ordered as noted in patient's After Visit Summary  Personalized health advice and appropriate referrals for health education or preventive services given if needed, as noted in patient's After Visit Summary  History of Present Illness:     Patient presents for a Medicare Wellness Visit    Patient presents for follow-up/Medicare wellness  She remains on daily medications for hypertension, hyperlipidemia  She denies symptoms of chest pain,shortness of breath or dizziness  No recent follow-up with Cardiology , [facility]  Palpitations are extremely infrequent  She remains on beta-blocker  Chronic arthritic pain  Patient remains under ongoing care of [facility] for treatment of hip, groin and low back pain  Dr Rizo- ortho; Dr Snow -PM  Cardiology-  No recent  F/up  Patient is past due for Dx mammo -DR Sherman  Patient was seen in consultation by Rheumatology, Denice Marr in 8/2022- f/up as needed, no evidence of connective tissue disease  Patient is complaining of very pruritic and thick irritated rash 2nd and 3rd digits both hands  Some skin breakage  Rash has been present within past few months  Patient has been using over-the-counter moisturizer  Patient Care Team:  Lissette Lee MD as PCP - General (Family Medicine)     Review of Systems:     Review of Systems   Constitutional: Negative  HENT: Negative  Eyes: Negative  Respiratory: Negative  Cardiovascular: Negative  Gastrointestinal: Negative  Endocrine: Negative  Genitourinary: Negative  Musculoskeletal: Positive for arthralgias, back pain and neck pain  Skin: Negative  Allergic/Immunologic: Negative  Neurological: Negative  Hematological: Negative  Psychiatric/Behavioral: Negative           Problem List:     Patient Active Problem List   Diagnosis   • Benign essential hypertension   • Hypothyroidism   • Lumbosacral radiculitis   • Cervical stenosis of spinal canal   • Disc disorder of lumbar region   • Gastroesophageal reflux disease without esophagitis   • Seasonal allergic rhinitis due to pollen   • PSVT (paroxysmal supraventricular tachycardia) (HCC)   • Iron deficiency anemia secondary to inadequate dietary iron intake   • Irritable bowel syndrome with diarrhea   • Depression with anxiety   • Mixed hyperlipidemia   • Scoliosis (and kyphoscoliosis), idiopathic   • Vitamin B 12 deficiency   • KERON (generalized anxiety disorder)   • Calcification of left breast on mammography   • Obstructive sleep apnea syndrome   • Continuous opioid dependence (HCC)   • Impingement syndrome of left shoulder   • Chronic left hip pain   • Polyarticular arthritis   • Sacroiliitis, not elsewhere classified [facility])      Past Medical and Surgical History:     Past Medical History:   Diagnosis Date   • Anemia     Iron and B-12 anemia   • Anxiety and depression    • Bleeding ulcer    • Disease of thyroid gland • GERD (gastroesophageal reflux disease)    • Groin pain, right 2/3/2021   • Scoliosis    • Spinal meningioma [facility])      Past Surgical History:   Procedure Laterality Date   • SPINAL CORD STIMULATOR IMPLANT     • SPINE SURGERY  1996     Bibiana pepper [facility]      Family History:     Family History   Problem Relation Age of Onset   • Heart disease Mother    • Skin cancer Mother         unsure of type   • Esophageal cancer Father    • Kidney cancer Father    • Thyroid disease Father    • Diabetes Son    • Skin cancer Sister         unsure of type   • Cervical cancer Maternal Grandmother    • No Known Problems Maternal Grandfather    • No Known Problems Paternal Grandmother    • No Known Problems Paternal Grandfather    • No Known Problems Sister    • Skin cancer Maternal Aunt         unsure of type   • No Known Problems Paternal Aunt    • No Known Problems Paternal Aunt       Social History:     Social History     Socioeconomic History   • Marital status:      Spouse name: None   • Number of children: None   • Years of education: None   • Highest education level: None   Occupational History   • None   Tobacco Use   • Smoking status: Never Smoker   • Smokeless tobacco: Never Used   Vaping Use   • Vaping Use: Never used   Substance and Sexual Activity   • Alcohol use: Yes     Comment: rare   • Drug use: No   • Sexual activity: Not Currently     Partners: Male   Other Topics Concern   • None   Social History Narrative        Retired    2 adult sons    Grandchildren     Social Determinants of Health     Financial Resource Strain: Unknown   • Difficulty of Paying Living Expenses: Patient refused   Food Insecurity: Not on file   Transportation Needs: No Transportation Needs   • Lack of Transportation (Medical): No   • Lack of Transportation (Non-Medical):  No   Physical Activity: Not on file   Stress: Not on file   Social Connections: Not on file   Intimate Partner Violence: Not on file   Housing Stability: Not on file      Medications and Allergies:     Current Outpatient Medications   Medication Sig Dispense Refill   • fluocinonide (LIDEX) 0 05 % cream Apply topically 2 (two) times a day 60 g 1   • pantoprazole (PROTONIX) 40 mg tablet Take 1 tablet (40 mg total) by mouth daily 30 tablet 6   • amLODIPine (NORVASC) 5 mg tablet TAKE 1 TABLET BY MOUTH EVERY DAY 90 tablet 1   • atorvastatin (LIPITOR) 40 mg tablet TAKE 1 TABLET BY MOUTH 4 DAYS PER WEEK 54 tablet 1   • Calcium Carbonate-Vitamin D (CALCIUM CREAMIES PO) Take 600 mg by mouth daily     • Cholecalciferol 25 MCG (1000 UT) capsule Take 2,000 Units by mouth     • clonazePAM (KlonoPIN) 1 mg tablet TAKE HALF A TABLET (0 5 MG) TWICE A DAY AS NEEDED FOR ANXIETY 30 tablet 5   • dicyclomine (BENTYL) 20 mg tablet TAKE 1 TABLET BY MOUTH 2 TIMES A DAY AS NEEDED (ABDOMINAL BLOATING/PAIN) 60 tablet 2   • ferrous sulfate 325 (65 Fe) mg tablet Take by oral route  • HYDROcodone-acetaminophen (NORCO) 5-325 mg per tablet Take 1 tablet 2 times a day as needed for severe low back pain 30 tablet 0   • levothyroxine 125 mcg tablet TAKE 1 TABLET BY MOUTH EVERY DAY 90 tablet 1   • metaxalone (SKELAXIN) 800 mg tablet Take 1 tablet (800 mg total) by mouth 2 (two) times a day as needed for muscle spasms 60 tablet 2   • naloxone (NARCAN) 4 mg/0 1 mL nasal spray Administer 1 spray into a nostril  If no response after 2-3 minutes, give another dose in the other nostril using a new spray   1 each 1   • nebivolol (BYSTOLIC) 10 mg tablet TAKE 1 TABLET BY MOUTH EVERY DAY 90 tablet 3   • sertraline (ZOLOFT) 100 mg tablet TAKE 2 TABLETS BY MOUTH EVERY  tablet 3   • tiZANidine (ZANAFLEX) 4 mg tablet TAKE 2 TABLETS BY MOUTH AT BEDTIME AS NEEDED FOR MUSCLE SPASMS 180 tablet 1     Current Facility-Administered Medications   Medication Dose Route Frequency Provider Last Rate Last Admin   • cyanocobalamin injection 1,000 mcg  1,000 mcg Intramuscular Q30 Days Albert Sweeney MD 1,000 mcg at 02/21/18 1534   • cyanocobalamin injection 1,000 mcg  1,000 mcg Intramuscular Q30 Days Margarita Lee MD   1,000 mcg at 03/29/18 4309   • cyanocobalamin injection 1,000 mcg  1,000 mcg Intramuscular Q30 Days Margarita Lee MD   1,000 mcg at 04/30/18 1737   • cyanocobalamin injection 1,000 mcg  1,000 mcg Intramuscular N32 Days Ana Maria Hills MD   8,485 mcg at 08/10/18 1017   • cyanocobalamin injection 1,000 mcg  1,000 mcg Intramuscular Q30 Days Margarita Lee MD   1,000 mcg at 09/10/18 1127   • cyanocobalamin injection 1,000 mcg  1,000 mcg Intramuscular Q30 Days Margarita Lee MD   1,000 mcg at 02/11/19 2050   • cyanocobalamin injection 1,000 mcg  1,000 mcg Intramuscular Q30 Days Margarita Lee MD   1,000 mcg at 09/23/19 1739   • cyanocobalamin injection 1,000 mcg  1,000 mcg Intramuscular Q30 Days Margarita Lee MD   1,000 mcg at 10/29/19 2010   • cyanocobalamin injection 1,000 mcg  1,000 mcg Intramuscular Q30 Days Janet Sifuentes MD   1,000 mcg at 12/06/19 1513   • cyanocobalamin injection 1,000 mcg  1,000 mcg Intramuscular Q30 Days Margarita Lee MD   1,000 mcg at 07/02/20 1412   • cyanocobalamin injection 1,000 mcg  1,000 mcg Intramuscular Q30 Days Margarita Lee MD   1,000 mcg at 06/01/21 1100     Allergies   Allergen Reactions   • Cephalosporins Other (See Comments)     GI Upset (vomit/diarrhea)   • Medical Tape Rash   • Metformin      Other reaction(s): Nausea and/or vomiting   • Nabumetone GI Intolerance   • Other GI Intolerance   • Anesthesia S-I-40 [Propofol]    • Celecoxib    • Cephalexin    • Choline Magnesium Trisalicylate    • Diclofenac    • Diclofenac Potassium(Migraine) GI Intolerance   • Diclofenac Sodium GI Intolerance   • Fentanyl    • Propoxyphene Other (See Comments)     unknown   • Sulfa Antibiotics    • Sulfacetamide    • Zolpidem       Immunizations:     Immunization History   Administered Date(s) Administered   • COVID-19 MODERNA VACC 0 5 ML IM 03/31/2021, 04/28/2021   • INFLUENZA 10/08/2007, 10/24/2008, 10/13/2009, 10/17/2011, 10/13/2012, 10/21/2013, 11/05/2014, 10/02/2015, 10/13/2016, 10/10/2017, 10/10/2017, 10/28/2022   • Influenza, high dose seasonal 0 7 mL 10/28/2022   • Influenza, recombinant, quadrivalent,injectable, preservative free 10/10/2018   • Pneumococcal Conjugate 13-Valent 05/09/2019   • Pneumococcal Conjugate Vaccine 20-valent (Pcv20), Polysace 11/15/2022   • Td (adult), Unspecified 08/12/1993   • Tdap 02/17/2008, 04/22/2019   • Zoster 04/29/2014      Health Maintenance:         Topic Date Due   • DXA SCAN  Never done   • Breast Cancer Screening: Mammogram  05/25/2023   • Colorectal Cancer Screening  08/28/2023   • Cervical Cancer Screening  06/30/2024   • Hepatitis C Screening  Completed         Topic Date Due   • COVID-19 Vaccine (3 - Booster for Sheryl Goodrich series) 09/28/2021      Medicare Screening Tests and Risk Assessments:     Zeny Macdonald is here for her Subsequent Wellness visit  Last Medicare Wellness visit information reviewed, patient interviewed and updates made to the record today  Health Risk Assessment:   Patient rates overall health as very good  Patient feels that their physical health rating is slightly better  Patient is very satisfied with their life  Eyesight was rated as same  Hearing was rated as slightly worse  Patient feels that their emotional and mental health rating is slightly better  Patients states they are never, rarely angry  Patient states they are never, rarely unusually tired/fatigued  Pain experienced in the last 7 days has been a lot  Patient's pain rating has been 7/10  Patient states that she has experienced no weight loss or gain in last 6 months  Depression Screening:   PHQ-9 Score: 0      Fall Risk Screening:    In the past year, patient has experienced: history of falling in past year    Number of falls: 2 or more  Injured during fall?: No    Feels unsteady when standing or walking?: Yes Worried about falling?: Yes      Urinary Incontinence Screening:   Patient has leaked urine accidently in the last six months  Home Safety:  Patient has trouble with stairs inside or outside of their home  Patient has working smoke alarms and has working carbon monoxide detector  Home safety hazards include: none  Nutrition:   Current diet is Regular  Medications:   Patient is currently taking over-the-counter supplements  OTC medications include: see medication list  Patient is able to manage medications  Activities of Daily Living (ADLs)/Instrumental Activities of Daily Living (IADLs):   Walk and transfer into and out of bed and chair?: Yes  Dress and groom yourself?: Yes    Bathe or shower yourself?: Yes    Feed yourself?  Yes  Do your laundry/housekeeping?: Yes  Manage your money, pay your bills and track your expenses?: Yes  Make your own meals?: Yes    Do your own shopping?: Yes    Previous Hospitalizations:   Any hospitalizations or ED visits within the last 12 months?: No      Advance Care Planning:   Living will: Yes    Durable POA for healthcare: No    Advanced directive: Yes      Cognitive Screening:   Provider or family/friend/caregiver concerned regarding cognition?: No    PREVENTIVE SCREENINGS      Cardiovascular Screening:    General: Screening Not Indicated and History Lipid Disorder      Diabetes Screening:     General: Screening Current      Colorectal Cancer Screening:     General: Screening Current      Breast Cancer Screening:     General: Screening Current    Due for: Mammogram        Cervical Cancer Screening:    General: Screening Not Indicated      Osteoporosis Screening:    General: Risks and Benefits Discussed    Due for: DXA Axial      Abdominal Aortic Aneurysm (AAA) Screening:        General: Screening Not Indicated      Lung Cancer Screening:     General: Screening Not Indicated      Hepatitis C Screening:    General: Screening Current    Screening, Brief Intervention, and Referral to Treatment (SBIRT)    Screening  Typical number of drinks in a day: 0  Typical number of drinks in a week: 0  Interpretation: Low risk drinking behavior  Single Item Drug Screening:  How often have you used an illegal drug (including marijuana) or a prescription medication for non-medical reasons in the past year? never    Single Item Drug Screen Score: 0  Interpretation: Negative screen for possible drug use disorder    Brief Intervention  Alcohol & drug use screenings were reviewed  No concerns regarding substance use disorder identified  Review of Current Opioid Use  Opioid Risk Tool (ORT) Score: 0  Opioid Risk Tool (ORT) Interpretation: Score 0-3: Low risk for opioid misuse    [place]MP or [place]  reviewed  No red flags were identified    Other Counseling Topics:   Regular weightbearing exercise  No exam data present     Physical Exam:     /70 (BP Location: Left arm, Patient Position: Sitting, Cuff Size: Large)   Pulse 59   Temp 98 °F (36 7 °C) (Temporal)   Resp 16   Ht 5' (1 524 m)   Wt 68 3 kg (150 lb 9 6 oz)   SpO2 97%   BMI 29 41 kg/m²     Physical Exam  Vitals and nursing note reviewed  Constitutional:       General: She is not in acute distress  Appearance: Normal appearance  She is well-developed  She is not ill-appearing  HENT:      Head: Normocephalic and atraumatic  Eyes:      General: No scleral icterus  Conjunctiva/sclera: Conjunctivae normal    Neck:      Vascular: No carotid bruit  Cardiovascular:      Rate and Rhythm: Normal rate and regular rhythm  Heart sounds: Normal heart sounds  No murmur heard  Pulmonary:      Effort: Pulmonary effort is normal  No respiratory distress  Breath sounds: Normal breath sounds  Abdominal:      General: Bowel sounds are normal  There is no abdominal bruit  Palpations: Abdomen is soft  Tenderness: There is no abdominal tenderness  Musculoskeletal:      Cervical back: Neck supple   No rigidity  Right lower leg: No edema  Left lower leg: No edema  Comments: Antalgic gait   Skin:     General: Skin is warm  Comments: Dyshidrotic eczema, thickened and broken skin 2nd and 3rd digits both hands   Neurological:      General: No focal deficit present  Mental Status: She is alert and oriented to person, place, and time  Psychiatric:         Mood and Affect: Mood normal          Behavior: Behavior normal          Thought Content:  Thought content normal           Lissette Lee MD

## 2022-11-16 DIAGNOSIS — G47.33 OBSTRUCTIVE SLEEP APNEA SYNDROME: Primary | ICD-10-CM

## 2022-11-16 NOTE — ASSESSMENT & PLAN NOTE
Patient had connective tissue disease workup and consultation by Rheumatology  Diagnosed with osteoarthritis    Follow-up with Rheumatology PRN

## 2022-11-17 DIAGNOSIS — E03.9 HYPOTHYROIDISM, UNSPECIFIED TYPE: ICD-10-CM

## 2022-11-17 RX ORDER — LEVOTHYROXINE SODIUM 0.12 MG/1
TABLET ORAL
Qty: 90 TABLET | Refills: 1 | Status: SHIPPED | OUTPATIENT
Start: 2022-11-17

## 2022-11-20 DIAGNOSIS — K58.0 IRRITABLE BOWEL SYNDROME WITH DIARRHEA: ICD-10-CM

## 2022-11-20 RX ORDER — DICYCLOMINE HCL 20 MG
TABLET ORAL
Qty: 180 TABLET | Refills: 1 | Status: SHIPPED | OUTPATIENT
Start: 2022-11-20

## 2022-12-01 ENCOUNTER — CLINICAL SUPPORT (OUTPATIENT)
Dept: FAMILY MEDICINE CLINIC | Facility: CLINIC | Age: 67
End: 2022-12-01

## 2022-12-01 DIAGNOSIS — E53.8 VITAMIN B 12 DEFICIENCY: Primary | ICD-10-CM

## 2022-12-01 RX ORDER — CYANOCOBALAMIN 1000 UG/ML
1000 INJECTION, SOLUTION INTRAMUSCULAR; SUBCUTANEOUS ONCE
Status: COMPLETED | OUTPATIENT
Start: 2022-12-01 | End: 2022-12-01

## 2022-12-01 RX ADMIN — CYANOCOBALAMIN 1000 MCG: 1000 INJECTION, SOLUTION INTRAMUSCULAR; SUBCUTANEOUS at 14:54

## 2022-12-08 DIAGNOSIS — K21.9 GASTROESOPHAGEAL REFLUX DISEASE WITHOUT ESOPHAGITIS: ICD-10-CM

## 2022-12-08 RX ORDER — PANTOPRAZOLE SODIUM 40 MG/1
TABLET, DELAYED RELEASE ORAL
Qty: 90 TABLET | Refills: 3 | Status: SHIPPED | OUTPATIENT
Start: 2022-12-08

## 2022-12-20 DIAGNOSIS — M51.36 DDD (DEGENERATIVE DISC DISEASE), LUMBAR: ICD-10-CM

## 2022-12-20 RX ORDER — HYDROCODONE BITARTRATE AND ACETAMINOPHEN 5; 325 MG/1; MG/1
TABLET ORAL
Qty: 30 TABLET | Refills: 0 | Status: SHIPPED | OUTPATIENT
Start: 2022-12-20

## 2023-01-03 ENCOUNTER — CLINICAL SUPPORT (OUTPATIENT)
Dept: FAMILY MEDICINE CLINIC | Facility: CLINIC | Age: 68
End: 2023-01-03

## 2023-01-03 DIAGNOSIS — E53.8 VITAMIN B 12 DEFICIENCY: Primary | ICD-10-CM

## 2023-01-03 RX ORDER — CYANOCOBALAMIN 1000 UG/ML
1000 INJECTION, SOLUTION INTRAMUSCULAR; SUBCUTANEOUS ONCE
Status: COMPLETED | OUTPATIENT
Start: 2023-01-03 | End: 2023-01-03

## 2023-01-03 RX ADMIN — CYANOCOBALAMIN 1000 MCG: 1000 INJECTION, SOLUTION INTRAMUSCULAR; SUBCUTANEOUS at 13:56

## 2023-01-05 ENCOUNTER — OFFICE VISIT (OUTPATIENT)
Dept: PULMONOLOGY | Facility: HOSPITAL | Age: 68
End: 2023-01-05

## 2023-01-05 VITALS
SYSTOLIC BLOOD PRESSURE: 140 MMHG | BODY MASS INDEX: 29.25 KG/M2 | HEIGHT: 60 IN | DIASTOLIC BLOOD PRESSURE: 82 MMHG | WEIGHT: 149 LBS | HEART RATE: 70 BPM | OXYGEN SATURATION: 96 %

## 2023-01-05 DIAGNOSIS — R06.83 SNORING: ICD-10-CM

## 2023-01-05 DIAGNOSIS — G47.19 EXCESSIVE DAYTIME SLEEPINESS: ICD-10-CM

## 2023-01-05 DIAGNOSIS — I47.1 PSVT (PAROXYSMAL SUPRAVENTRICULAR TACHYCARDIA) (HCC): ICD-10-CM

## 2023-01-05 DIAGNOSIS — G47.33 OBSTRUCTIVE SLEEP APNEA SYNDROME: Primary | ICD-10-CM

## 2023-01-05 NOTE — PROGRESS NOTES
Sleep Consultation   Ekaterina Johnson 75 y o  female MRN: [id number]      Reason for consultation: CHAZ     Requesting physician: Dr Rae Rider     Assessment/Plan  1  Obstructive sleep apnea syndrome  Assessment & Plan:  · Snow Rodriguez has remotely diagnosed obstructive sleep apnea she is here today to reestablish her diagnosis  · She has all the symptoms, she snores loudly, has been witnessed to stop breathing while she is asleep and she has excessive daytime sleepiness  · She would like to establish a diagnosis, I ordered an in lab study  · I explained to the patient in details the pathophysiology of obstructive sleep apnea  I also explained the importance of treatment, and the consequences of untreated obstructive sleep apnea on underlying cardiovascular and cerebrovascular risk, morbidity, and mortality  Orders:  -     Ambulatory Referral to Sleep Medicine  -     Diagnostic Sleep Study; Future; Expected date: 01/06/2023    2  PSVT (paroxysmal supraventricular tachycardia) (HCC)  Assessment & Plan:  Untreated CHAZ is risk for uncontrolled arrhythmias    Orders:  -     Diagnostic Sleep Study; Future; Expected date: 01/06/2023    3  Excessive daytime sleepiness  -     Diagnostic Sleep Study; Future; Expected date: 01/06/2023    4  Snoring  -     Diagnostic Sleep Study; Future; Expected date: 01/06/2023          History of Present Illness   HPI:  Ekaterina Johsnon is a 75 y o  female with PMHx as below who comes in for evaluation of obstructive sleep apnea  She has been diagnosed remotely with obstructive sleep apnea she is currently retired  she goes to bed around 1 AM and wakes up at 10 AM she snores loudly and snores stops breathing while she is asleep she feels sleepy during the day she does not have CPAP currently she drinks coffee denies tobacco alcohol or drug abuse her Pulteney scale is 11 out of 24              Review of Systems      Genitourinary post menopausal (no peroids)   Cardiology none Gastrointestinal frequent heartburn/acid reflux   Neurology muscle weakness and balance problems   Constitutional fatigue   Integumentary none   Psychiatry anxiety and depression   Musculoskeletal joint pain, muscle aches, back pain and sciatica   Pulmonary snoring   ENT none   Endocrine none   Hematological none               Historical Information   Past Medical History:   Diagnosis Date   • Anemia     Iron and B-12 anemia   • Anxiety and depression    • Bleeding ulcer    • Disease of thyroid gland    • GERD (gastroesophageal reflux disease)    • Groin pain, right 2/3/2021   • Scoliosis    • Spinal meningioma [facility])      Past Surgical History:   Procedure Laterality Date   • SPINAL CORD STIMULATOR IMPLANT     • SPINE SURGERY  1996     [facility]     Family History   Problem Relation Age of Onset   • Heart disease Mother    • Skin cancer Mother         unsure of type   • Esophageal cancer Father    • Kidney cancer Father    • Thyroid disease Father    • Diabetes Son    • Skin cancer Sister         unsure of type   • Cervical cancer Maternal Grandmother    • No Known Problems Maternal Grandfather    • No Known Problems Paternal Grandmother    • No Known Problems Paternal Grandfather    • No Known Problems Sister    • Skin cancer Maternal Aunt         unsure of type   • No Known Problems Paternal Aunt    • No Known Problems Paternal Aunt      Social History     Socioeconomic History   • Marital status:       Spouse name: Not on file   • Number of children: Not on file   • Years of education: Not on file   • Highest education level: Not on file   Occupational History   • Not on file   Tobacco Use   • Smoking status: Never   • Smokeless tobacco: Never   Vaping Use   • Vaping Use: Never used   Substance and Sexual Activity   • Alcohol use: Yes     Comment: rare   • Drug use: No   • Sexual activity: Not Currently     Partners: Male   Other Topics Concern   • Not on file   Social History Narrative        Retired    2 adult sons    Grandchildren     Social Determinants of Health     Financial Resource Strain: Unknown   • Difficulty of Paying Living Expenses: Patient refused   Food Insecurity: Not on file   Transportation Needs: No Transportation Needs   • Lack of Transportation (Medical): No   • Lack of Transportation (Non-Medical): No   Physical Activity: Not on file   Stress: Not on file   Social Connections: Not on file   Intimate Partner Violence: Not on file   Housing Stability: Not on file       Occupational History: retired      Meds/Allergies   Allergies   Allergen Reactions   • Cephalosporins Other (See Comments)     GI Upset (vomit/diarrhea)   • Medical Tape Rash   • Metformin      Other reaction(s): Nausea and/or vomiting   • Nabumetone GI Intolerance   • Other GI Intolerance   • Anesthesia S-I-40 [Propofol]    • Celecoxib    • Cephalexin    • Choline Magnesium Trisalicylate    • Diclofenac    • Diclofenac Potassium(Migraine) GI Intolerance   • Diclofenac Sodium GI Intolerance   • Fentanyl    • Propoxyphene Other (See Comments)     unknown   • Sulfa Antibiotics    • Sulfacetamide    • Zolpidem        Home medications:  Prior to Admission medications    Medication Sig Start Date End Date Taking?  Authorizing Provider   amLODIPine (NORVASC) 5 mg tablet TAKE 1 TABLET BY MOUTH EVERY DAY 7/31/22   Margarita Lee MD   atorvastatin (LIPITOR) 40 mg tablet TAKE 1 TABLET BY MOUTH 4 DAYS PER WEEK 9/5/22   Margarita Lee MD   Calcium Carbonate-Vitamin D (CALCIUM CREAMIES PO) Take 600 mg by mouth daily    Margarita Lee MD   Cholecalciferol 25 MCG (1000 UT) capsule Take 2,000 Units by mouth    Historical Provider, MD   clonazePAM (KlonoPIN) 1 mg tablet TAKE HALF A TABLET (0 5 MG) TWICE A DAY AS NEEDED FOR ANXIETY 10/1/22   Margarita Lee MD   dicyclomine (BENTYL) 20 mg tablet TAKE 1 TABLET BY MOUTH 2 TIMES A DAY AS NEEDED (ABDOMINAL BLOATING/PAIN) 11/20/22   Michael Lopez Anuja Wade MD   ferrous sulfate 325 (65 Fe) mg tablet Take by oral route  Historical Provider, MD   fluocinonide (LIDEX) 0 05 % cream Apply topically 2 (two) times a day 11/15/22   Kaylyn Morris MD   HYDROcodone-acetaminophen [facility]) 5-325 mg per tablet Take 1 tablet 2 times a day as needed for severe low back pain 12/20/22   Kaylyn Morris MD   levothyroxine 125 mcg tablet TAKE 1 TABLET BY MOUTH EVERY DAY 11/17/22   Kaylyn Morris MD   metaxalone (SKELAXIN) 800 mg tablet Take 1 tablet (800 mg total) by mouth 2 (two) times a day as needed for muscle spasms 4/5/22   Kaylyn Morris MD   naloxone [facility]) 4 mg/0 1 mL nasal spray Administer 1 spray into a nostril  If no response after 2-3 minutes, give another dose in the other nostril using a new spray  8/3/22   Kaylyn Morris MD   nebivolol (BYSTOLIC) 10 mg tablet TAKE 1 TABLET BY MOUTH EVERY DAY 5/1/22   Kaylyn Morris MD   pantoprazole (PROTONIX) 40 mg tablet TAKE 1 TABLET BY MOUTH EVERY DAY 12/8/22   Kaylyn Morris MD   sertraline (ZOLOFT) 100 mg tablet TAKE 2 TABLETS BY MOUTH EVERY DAY 3/12/22   Kaylyn Morris MD   tiZANidine (ZANAFLEX) 4 mg tablet TAKE 2 TABLETS BY MOUTH AT BEDTIME AS NEEDED FOR MUSCLE SPASMS 6/16/22   Kaylyn Morris MD       Vitals:   Blood pressure 140/82, pulse 70, height 5' (1 524 m), weight 67 6 kg (149 lb), SpO2 96 %, not currently breastfeeding ,  Body mass index is 29 1 kg/m²    Neck Circumference: 14    Physical Exam  General:  Awake alert and oriented x 3, conversant without conversational dyspnea, NAD, normal affect  HEENT:   Sclera noninjected, nonicteric OU, Nares patent,  no craniofacial abnormalities, Mucous membranes, moist, no oral lesions, normal dentition  NECK:  Trachea midline, no accessory muscle use, no stridor,  JVP not elevated  CARDIAC: Reg, single s1/S2, no m/r/g  PULM: CTA bilaterally no wheezing, rhonchi or rales  ABD: Soft nontender, nondistended, no rebound, no rigidity, no guarding  EXT: No cyanosis, no clubbing, no edema, normal capillary refill  NEURO: no focal neurologic deficits, AAOx3, moving all extremities appropriately    Labs: I have personally reviewed pertinent lab results  , ABG: No results found for: PHART, NRI2PQU, PO2ART, ZVS1TAU, B7KSOKPR, BEART, SOURCE, BNP: No results found for: BNP, CBC: No results found for: WBC, HGB, HCT, MCV, PLT, ADJUSTEDWBC, MCH, MCHC, RDW, MPV, NRBC, CMP: No results found for: SODIUM, K, CL, CO2, ANIONGAP, BUN, CREATININE, GLUCOSE, CALCIUM, AST, ALT, ALKPHOS, PROT, BILITOT, EGFR, PT/INR: No results found for: PT, INR, Troponin: No results found for: TROPONINI  Lab Results   Component Value Date    WBC 8 29 06/24/2022    HGB 12 5 06/24/2022    HCT 37 7 06/24/2022    MCV 92 06/24/2022     06/24/2022      Lab Results   Component Value Date    CALCIUM 9 3 06/24/2022    K 4 6 06/24/2022    CO2 29 06/24/2022     06/24/2022    BUN 21 06/24/2022    CREATININE 0 67 06/24/2022     Lab Results   Component Value Date    IRON 91 11/22/2019     Lab Results   Component Value Date    [id number] 06/24/2022     Lab Results   Component Value Date    FOLATE 9 6 06/24/2022         Sleep studies:  Diagnostic: Ordered         Levi Hyde MD  [facility]       Portions of the record may have been created with voice recognition software  Occasional wrong word or "sound a like" substitutions may have occurred due to the inherent limitations of voice recognition software  Read the chart carefully and recognize, using context, where substitutions have occurred

## 2023-01-05 NOTE — ASSESSMENT & PLAN NOTE
· Ekta Toro has remotely diagnosed obstructive sleep apnea she is here today to reestablish her diagnosis  · She has all the symptoms, she snores loudly, has been witnessed to stop breathing while she is asleep and she has excessive daytime sleepiness  · She would like to establish a diagnosis, I ordered an in lab study  · I explained to the patient in details the pathophysiology of obstructive sleep apnea  I also explained the importance of treatment, and the consequences of untreated obstructive sleep apnea on underlying cardiovascular and cerebrovascular risk, morbidity, and mortality

## 2023-01-05 NOTE — PATIENT INSTRUCTIONS
Sleep Apnea   AMBULATORY CARE:   Sleep apnea  is a condition that causes you to stop breathing often during sleep  Types of sleep apnea:   Obstructive sleep apnea (CHAZ)  is the most common kind  The muscles and tissues around your throat relax and block air from passing through  Obesity, use of alcohol or cigarettes, or a family history are common causes  CHAZ may increase your risk for complications after surgery  Central sleep apnea (CSA)  means your brain does not send signals to the muscles that control breathing  You do not take a breath even though your airway is open  Common causes include medical conditions such as heart failure, being older than 40, or use of opioids  Complex (or mixed) sleep apnea  means you have both obstructive and central sleep apnea  Common signs and symptoms:   Loud snoring or long pauses in breathing    Feeling sleepy, slow, and tired during the day    Snorting, gasping, or choking while you sleep, and waking up suddenly because of these    Feeling irritable during the day    Dry mouth or a headache in the mornings    Heavy night sweating    A hard time thinking, remembering things, or focusing on your tasks the following day    Call your local emergency number (911 in the [address] [facility]) if:   You have chest pain or trouble breathing  Call your doctor if:   You have new or worsening signs or symptoms  You have questions or concerns about your condition or care  Treatment  depends on the kind of apnea you have  A mouth device  may be needed if you have mild sleep apnea  These are designed to keep your throat open  Ask your dentist or healthcare provider about the best mouth device for you  A machine  may be used to help you get more air during sleep  A mask may be placed over your nose and mouth, or just your nose  The mask is hooked to the machine  You will get air through the mask      A continuous positive airway pressure (CPAP) machine  is used to keep your airway open during sleep  The machine blows a gentle stream of air into the mask when you breathe  This helps keep your airway open so you can breathe more regularly  Extra oxygen may be given through the machine  A bilevel positive airway pressure (BiPAP) machine  gives air but lowers the pressure when you breathe out  An adaptive servo-ventilator (ASV)  is a machine that learns your usual breathing pattern  Then, it uses pressure to give you air and prevent stops in your breathing  Surgery  to expand your airway or remove extra tissues may be needed  Surgery is usually only considered if other treatments do not work  Manage or prevent sleep apnea:   Reach and maintain a healthy weight  Ask your healthcare provider what a healthy weight is for you  Ask him or her to help you create a safe weight loss plan if you are overweight  Even a small goal of a 10% weight loss can improve your symptoms  Do not smoke  Nicotine and other chemicals in cigarettes and cigars can cause lung damage  Ask your healthcare provider for information if you currently smoke and need help to quit  E-cigarettes or smokeless tobacco still contain nicotine  Talk to your healthcare provider before you use these products  Do not drink alcohol or take sedative medicine before you go to sleep  Alcohol and sedatives can relax the muscles and tissues around your throat  This can block the airflow to your lungs  Sleep on your side or use pillows designed to prevent sleep apnea  This prevents your tongue or other tissues from blocking your throat  You can also raise the head of your bed  Follow up with your doctor or specialist as directed: You may need to have blood tests during your follow-up visits  Work with your provider to find the right breathing support equipment and settings for you  Write down your questions so you remember to ask them during your visits    © Copyright [employer] 2022 Information is for End User's use only and may not be sold, redistributed or otherwise used for commercial purposes  All illustrations and images included in CareNotes® are the copyrighted property of A D A M , Inc  or Heri Golden  The above information is an  only  It is not intended as medical advice for individual conditions or treatments  Talk to your doctor, nurse or pharmacist before following any medical regimen to see if it is safe and effective for you

## 2023-01-08 ENCOUNTER — HOSPITAL ENCOUNTER (OUTPATIENT)
Dept: SLEEP CENTER | Facility: CLINIC | Age: 68
Discharge: HOME | End: 2023-01-08

## 2023-01-08 DIAGNOSIS — I47.1 PSVT (PAROXYSMAL SUPRAVENTRICULAR TACHYCARDIA) (HCC): ICD-10-CM

## 2023-01-08 DIAGNOSIS — G47.19 EXCESSIVE DAYTIME SLEEPINESS: ICD-10-CM

## 2023-01-08 DIAGNOSIS — R06.83 SNORING: ICD-10-CM

## 2023-01-08 DIAGNOSIS — G47.33 OBSTRUCTIVE SLEEP APNEA SYNDROME: ICD-10-CM

## 2023-01-09 NOTE — PROGRESS NOTES
Sleep Study Documentation    Pre-Sleep Study       Sleep testing procedure explained to patient:YES    Patient napped prior to study:NO    Caffeine:Dayshift worker after 12PM   Caffeine use:NO    Alcohol:Dayshift workers after 5PM: Alcohol use:NO    Typical day for patient:YES       Study Documentation    Sleep Study Indications: Excessive Daytime Sleepiness, Unrefreshing sleep, Witnessed apneas, Witnessed gasping, HTN, Obstructive sleep apnea syndrome, paroxysmal supraventricular tachycardia, Snoring    Sleep Study: Diagnostic   Snore:None  Supplemental O2: no    O2 flow rate (L/min) range   O2 flow rate (L/min) final   Minimum SaO2 83%  Baseline SaO2 93%    EKG abnormalities: no     EEG abnormalities: no    Sleep Study Recorded < 2 hours: N/A    Sleep Study Recorded > 2 hours but incomplete study: N/A    Sleep Study Recorded 6 hours but no sleep obtained: NO    Patient classification: retired       Post-Sleep Study    Medication used at bedtime or during sleep study:YES other prescription medications    Patient reports time it took to fall asleep:30 to 60 minutes    Patient reports waking up during study:3 or more times  Patient reports returning to sleep without difficulty  Patient reports sleeping 2 to 4 hours without dreaming  Patient reports sleep during study:worse than usual    Patient rated sleepiness: Very sleepy or tired    PAP treatment:no

## 2023-01-24 ENCOUNTER — TELEPHONE (OUTPATIENT)
Dept: SLEEP CENTER | Facility: CLINIC | Age: 68
End: 2023-01-24

## 2023-01-24 NOTE — TELEPHONE ENCOUNTER
Sleep study was resulted and shows mild CHAZ, hypoxemia and PLMs  Dr Shaniqua Sánchez or Dr Edgar Fairbanks, please advise how to proceed

## 2023-01-26 NOTE — TELEPHONE ENCOUNTER
Per Dr Callie Pickering:  Hi,   Form chart review seems like patient has a remote diagnosis of CHAZ  Tammi Green is also on antihypertensives and treatment is recommended with mild CHAZ with HTN  Auto CPAP with pressure range of 5-20 cm H2O is recommended  Patient can be referred to a sleep specialist for management and follow up  Thank you    -----------------------------------------------------    Patient of Dr Elvia Monsivais at [address] [facility] [place]  Call placed to patient  Left message to advise that sleep study is resulted and to contact the nursing staff to review the results  Sleep study shows mild CHAZ, hypoxemia and PLMs

## 2023-01-30 DIAGNOSIS — I10 BENIGN ESSENTIAL HYPERTENSION: ICD-10-CM

## 2023-01-30 RX ORDER — AMLODIPINE BESYLATE 5 MG/1
TABLET ORAL
Qty: 90 TABLET | Refills: 1 | Status: SHIPPED | OUTPATIENT
Start: 2023-01-30

## 2023-01-30 NOTE — TELEPHONE ENCOUNTER
Called patient and advised of sleep study results  Advised to reach out to the pulmonary office for next steps  Phone number provided

## 2023-01-31 DIAGNOSIS — G47.33 OBSTRUCTIVE SLEEP APNEA SYNDROME: Primary | ICD-10-CM

## 2023-01-31 NOTE — TELEPHONE ENCOUNTER
I called patient back, had to leave a message, no Cpap order is placed, I will follow up with the provider to see if a Cpap will be ordered or if she needs an appointment  Please transfer patient's call to me when she calls back  Thank you

## 2023-02-03 LAB

## 2023-02-15 DIAGNOSIS — M51.36 DDD (DEGENERATIVE DISC DISEASE), LUMBAR: ICD-10-CM

## 2023-02-16 ENCOUNTER — CLINICAL SUPPORT (OUTPATIENT)
Dept: FAMILY MEDICINE CLINIC | Facility: CLINIC | Age: 68
End: 2023-02-16

## 2023-02-16 DIAGNOSIS — E53.8 VITAMIN B 12 DEFICIENCY: Primary | ICD-10-CM

## 2023-02-16 RX ORDER — CYANOCOBALAMIN 1000 UG/ML
1000 INJECTION, SOLUTION INTRAMUSCULAR; SUBCUTANEOUS ONCE
Status: COMPLETED | OUTPATIENT
Start: 2023-02-16 | End: 2023-02-16

## 2023-02-16 RX ADMIN — CYANOCOBALAMIN 1000 MCG: 1000 INJECTION, SOLUTION INTRAMUSCULAR; SUBCUTANEOUS at 15:13

## 2023-02-17 RX ORDER — HYDROCODONE BITARTRATE AND ACETAMINOPHEN 5; 325 MG/1; MG/1
TABLET ORAL
Qty: 30 TABLET | Refills: 0 | Status: SHIPPED | OUTPATIENT
Start: 2023-02-17

## 2023-02-21 ENCOUNTER — TELEPHONE (OUTPATIENT)
Dept: SLEEP CENTER | Facility: CLINIC | Age: 68
End: 2023-02-21

## 2023-02-21 LAB

## 2023-02-21 NOTE — TELEPHONE ENCOUNTER
I set pt   up today in the [place] [facility] office o PAP 5-20cm and gave mask P10 for her nasal pillow

## 2023-02-22 DIAGNOSIS — L30.9 ECZEMA OF BOTH HANDS: ICD-10-CM

## 2023-03-01 DIAGNOSIS — E78.2 MIXED HYPERLIPIDEMIA: ICD-10-CM

## 2023-03-01 RX ORDER — ATORVASTATIN CALCIUM 40 MG/1
40 TABLET, FILM COATED ORAL
Qty: 54 TABLET | Refills: 1 | Status: SHIPPED | OUTPATIENT
Start: 2023-03-02

## 2023-03-04 DIAGNOSIS — F41.1 GAD (GENERALIZED ANXIETY DISORDER): ICD-10-CM

## 2023-03-04 RX ORDER — SERTRALINE HYDROCHLORIDE 100 MG/1
TABLET, FILM COATED ORAL
Qty: 180 TABLET | Refills: 3 | Status: SHIPPED | OUTPATIENT
Start: 2023-03-04

## 2023-03-20 ENCOUNTER — CLINICAL SUPPORT (OUTPATIENT)
Dept: FAMILY MEDICINE CLINIC | Facility: CLINIC | Age: 68
End: 2023-03-20

## 2023-03-20 DIAGNOSIS — E53.8 VITAMIN B 12 DEFICIENCY: Primary | ICD-10-CM

## 2023-03-20 RX ORDER — CYANOCOBALAMIN 1000 UG/ML
1000 INJECTION, SOLUTION INTRAMUSCULAR; SUBCUTANEOUS ONCE
Status: COMPLETED | OUTPATIENT
Start: 2023-03-20 | End: 2023-03-20

## 2023-03-20 RX ADMIN — CYANOCOBALAMIN 1000 MCG: 1000 INJECTION, SOLUTION INTRAMUSCULAR; SUBCUTANEOUS at 14:46

## 2023-04-24 DIAGNOSIS — L30.9 ECZEMA OF BOTH HANDS: ICD-10-CM

## 2023-05-04 DIAGNOSIS — I47.1 PSVT (PAROXYSMAL SUPRAVENTRICULAR TACHYCARDIA) (HCC): ICD-10-CM

## 2023-05-04 DIAGNOSIS — I10 BENIGN ESSENTIAL HYPERTENSION: ICD-10-CM

## 2023-05-04 RX ORDER — NEBIVOLOL 10 MG/1
TABLET ORAL
Qty: 90 TABLET | Refills: 3 | Status: SHIPPED | OUTPATIENT
Start: 2023-05-04

## 2023-05-09 ENCOUNTER — RA CDI HCC (OUTPATIENT)
Dept: OTHER | Facility: HOSPITAL | Age: 68
End: 2023-05-09

## 2023-05-15 ENCOUNTER — OFFICE VISIT (OUTPATIENT)
Dept: FAMILY MEDICINE CLINIC | Facility: CLINIC | Age: 68
End: 2023-05-15

## 2023-05-15 VITALS
HEART RATE: 62 BPM | SYSTOLIC BLOOD PRESSURE: 128 MMHG | BODY MASS INDEX: 29.22 KG/M2 | RESPIRATION RATE: 16 BRPM | WEIGHT: 149.6 LBS | DIASTOLIC BLOOD PRESSURE: 72 MMHG | TEMPERATURE: 97.6 F | OXYGEN SATURATION: 95 %

## 2023-05-15 DIAGNOSIS — Z12.31 SCREENING MAMMOGRAM FOR BREAST CANCER: ICD-10-CM

## 2023-05-15 DIAGNOSIS — Z12.11 SCREENING FOR COLORECTAL CANCER: ICD-10-CM

## 2023-05-15 DIAGNOSIS — F41.8 DEPRESSION WITH ANXIETY: Chronic | ICD-10-CM

## 2023-05-15 DIAGNOSIS — F11.20 CONTINUOUS OPIOID DEPENDENCE (HCC): ICD-10-CM

## 2023-05-15 DIAGNOSIS — I10 BENIGN ESSENTIAL HYPERTENSION: Primary | ICD-10-CM

## 2023-05-15 DIAGNOSIS — E03.9 HYPOTHYROIDISM, UNSPECIFIED TYPE: ICD-10-CM

## 2023-05-15 DIAGNOSIS — I47.1 PSVT (PAROXYSMAL SUPRAVENTRICULAR TACHYCARDIA) (HCC): ICD-10-CM

## 2023-05-15 DIAGNOSIS — R92.8 ABNORMAL FINDING ON MAMMOGRAPHY: ICD-10-CM

## 2023-05-15 DIAGNOSIS — Z12.12 SCREENING FOR COLORECTAL CANCER: ICD-10-CM

## 2023-05-15 NOTE — PATIENT INSTRUCTIONS
Your last colonoscopy was performed on August 28, 2013    So please collect and mail  Cologuard in early September  Please schedule diagnostic mammogram at [facility]  Please proceed with blood work before vitamin B12 injection

## 2023-05-15 NOTE — PROGRESS NOTES
Name: Oswaldo Villeda      : 1955      MRN: [id number]  Encounter Provider: Carisa Aguilar MD  Encounter Date: 5/15/2023   Encounter department: [address] [place] [address]     1  Benign essential hypertension  Assessment & Plan:  Blood pressure is well controlled  Continue current medication regimen      2  Screening mammogram for breast cancer  -     Mammo diagnostic bilateral w 3d & cad; Future; Expected date: 05/15/2023    3  Screening for colorectal cancer  -     Cologuard    4  Abnormal finding on mammography  -     Mammo diagnostic bilateral w 3d & cad; Future; Expected date: 05/15/2023    5  Continuous opioid dependence ([facility] [address] )  Assessment & Plan:  Continue as needed low-dose Vicodin for chronic severe arthritic pain  No history of misuse      6  Hypothyroidism, unspecified type  Assessment & Plan:  Levothyroxine 125 mcg daily, patient is past due blood work, TSH and we will proceed shortly      7  PSVT (paroxysmal supraventricular tachycardia) (HCC)  Assessment & Plan:  Asymptomatic  Continue Bystolic  Pending follow-up with cardiology at [address]      8  Depression with anxiety  Assessment & Plan:  Symptoms are well controlled on Zoloft 200 mg daily and Klonopin 0 5 mg twice a day PRN         Patient Instructions   Your last colonoscopy was performed on 2013  So please collect and mail  Cologuard in early September  Please schedule diagnostic mamm[facility] at [facility]  Please proceed with blood work before vitamin B12 injection    Return in about 6 months (around 11/15/2023) for follow up, Medicare wellness  Subjective       Follow-up chronic medical conditions  Patient has been feeling stably  Chronic arthritic and back pain  Medications updated  No symptoms of chest pain, palpitations, shortness of breath or dizziness      Pending OV with GYN next week    Cardiology -[facility]-  Pending    Past due  Dx mammo-  H/o calcifications    Patient is past due blood work      Review of Systems   Constitutional: Negative  HENT: Negative  Eyes: Negative  Respiratory: Negative  Cardiovascular: Negative  Gastrointestinal: Negative  Endocrine: Negative  Genitourinary: Negative  Musculoskeletal: Positive for arthralgias and back pain  Allergic/Immunologic: Negative  Neurological: Negative  Hematological: Negative  Past Medical History:   Diagnosis Date   • Anemia     Iron and B-12 anemia   • Anxiety and depression    • Bleeding ulcer    • Disease of thyroid gland    • GERD (gastroesophageal reflux disease)    • Groin pain, right 2/3/2021   • Scoliosis    • Spinal meningioma [facility])      Past Surgical History:   Procedure Laterality Date   • SPINAL CORD STIMULATOR IMPLANT     • SPINE SURGERY  1996     [facility]     Family History   Problem Relation Age of Onset   • Heart disease Mother    • Skin cancer Mother         unsure of type   • Esophageal cancer Father    • Kidney cancer Father    • Thyroid disease Father    • Diabetes Son    • Skin cancer Sister         unsure of type   • Cervical cancer Maternal Grandmother    • No Known Problems Maternal Grandfather    • No Known Problems Paternal Grandmother    • No Known Problems Paternal Grandfather    • No Known Problems Sister    • Skin cancer Maternal Aunt         unsure of type   • No Known Problems Paternal Aunt    • No Known Problems Paternal Aunt      Social History     Socioeconomic History   • Marital status:       Spouse name: None   • Number of children: None   • Years of education: None   • Highest education level: None   Occupational History   • None   Tobacco Use   • Smoking status: Never   • Smokeless tobacco: Never   Vaping Use   • Vaping Use: Never used   Substance and Sexual Activity   • Alcohol use: Yes     Comment: rare   • Drug use: No   • Sexual activity: Not Currently     Partners: Male   Other Topics Concern   • None   Social History Narrative     Retired    2 adult sons    Grandchildren     Social Determinants of Health     Financial Resource Strain: Unknown   • Difficulty of Paying Living Expenses: Patient refused   Food Insecurity: Not on file   Transportation Needs: No Transportation Needs   • Lack of Transportation (Medical): No   • Lack of Transportation (Non-Medical): No   Physical Activity: Not on file   Stress: Not on file   Social Connections: Not on file   Intimate Partner Violence: Not on file   Housing Stability: Not on file     Current Outpatient Medications on File Prior to Visit   Medication Sig   • amLODIPine (NORVASC) 5 mg tablet TAKE 1 TABLET BY MOUTH EVERY DAY   • atorvastatin (LIPITOR) 40 mg tablet Take 1 tablet (40 mg total) by mouth 4 (four) times a week   • Calcium Carbonate-Vitamin D (CALCIUM CREAMIES PO) Take 600 mg by mouth daily   • Cholecalciferol 25 MCG (1000 UT) capsule Take 2,000 Units by mouth   • clonazePAM (KlonoPIN) 1 mg tablet TAKE HALF A TABLET (0 5 MG) TWICE A DAY AS NEEDED FOR ANXIETY   • dicyclomine (BENTYL) 20 mg tablet TAKE 1 TABLET BY MOUTH 2 TIMES A DAY AS NEEDED (ABDOMINAL BLOATING/PAIN)   • ferrous sulfate 325 (65 Fe) mg tablet Take by oral route  • fluocinonide (LIDEX) 0 05 % cream APPLY TO AFFECTED AREA TWICE A DAY   • HYDROcodone-acetaminophen (NORCO)  mg per tablet Take 0 5 tablets by mouth 2 (two) times a day as needed for moderate pain or severe pain Max Daily Amount: 1 tablet   • levothyroxine 125 mcg tablet TAKE 1 TABLET BY MOUTH EVERY DAY   • naloxone (NARCAN) 4 mg/0 1 mL nasal spray Administer 1 spray into a nostril  If no response after 2-3 minutes, give another dose in the other nostril using a new spray     • nebivolol (BYSTOLIC) 10 mg tablet TAKE 1 TABLET BY MOUTH EVERY DAY   • pantoprazole (PROTONIX) 40 mg tablet TAKE 1 TABLET BY MOUTH EVERY DAY   • sertraline (ZOLOFT) 100 mg tablet TAKE 2 TABLETS BY MOUTH EVERY DAY   • tiZANidine (ZANAFLEX) 4 mg tablet TAKE 2 TABLETS BY MOUTH AT BEDTIME AS NEEDED FOR MUSCLE SPASMS     Allergies   Allergen Reactions   • Cephalosporins Other (See Comments)     GI Upset (vomit/diarrhea)   • Medical Tape Rash   • Metformin      Other reaction(s): Nausea and/or vomiting   • Nabumetone GI Intolerance   • Other GI Intolerance   • Anesthesia S-I-40 [Propofol]    • Celecoxib    • Cephalexin    • Choline Magnesium Trisalicylate    • Diclofenac    • Diclofenac Potassium(Migraine) GI Intolerance   • Diclofenac Sodium GI Intolerance   • Fentanyl    • Propoxyphene Other (See Comments)     unknown   • Sulfa Antibiotics    • Sulfacetamide    • Zolpidem      Immunization History   Administered Date(s) Administered   • COVID-19 MODERNA VACC 0 5 ML IM 03/31/2021, 04/28/2021   • INFLUENZA 10/08/2007, 10/24/2008, 10/13/2009, 10/17/2011, 10/13/2012, 10/21/2013, 11/05/2014, 10/02/2015, 10/13/2016, 10/10/2017, 10/10/2017, 10/28/2022   • Influenza, high dose seasonal 0 7 mL 10/28/2022   • Influenza, recombinant, quadrivalent,injectable, preservative free 10/10/2018   • Pneumococcal Conjugate 13-Valent 05/09/2019   • Pneumococcal Conjugate Vaccine 20-valent (Pcv20), Polysace 11/15/2022   • Td (adult), Unspecified 08/12/1993   • Tdap 02/17/2008, 04/22/2019   • Zoster 04/29/2014       Objective     /72 (BP Location: Left arm)   Pulse 62   Temp 97 6 °F (36 4 °C) (Temporal)   Resp 16   Wt 67 9 kg (149 lb 9 6 oz)   SpO2 95%   BMI 29 22 kg/m²     Physical Exam  Vitals and nursing note reviewed  Constitutional:       General: She is not in acute distress  Appearance: Normal appearance  She is well-developed  She is not ill-appearing  HENT:      Head: Normocephalic and atraumatic  Eyes:      Conjunctiva/sclera: Conjunctivae normal    Neck:      Thyroid: No thyromegaly  Vascular: No carotid bruit  Cardiovascular:      Rate and Rhythm: Normal rate and regular rhythm  Heart sounds: Normal heart sounds  No murmur heard    Pulmonary:      Effort: Pulmonary effort is normal  No respiratory distress  Breath sounds: Normal breath sounds  No wheezing  Abdominal:      General: There is no abdominal bruit  Musculoskeletal:         General: Normal range of motion  Cervical back: Neck supple  No rigidity  Right lower leg: No edema  Left lower leg: No edema  Comments: Chronic arthritic deformities   Skin:     General: Skin is warm  Neurological:      General: No focal deficit present  Mental Status: She is alert and oriented to person, place, and time  Cranial Nerves: No cranial nerve deficit  Coordination: Coordination normal    Psychiatric:         Mood and Affect: Mood normal          Behavior: Behavior normal          Thought Content:  Thought content normal        Bere Gunter MD

## 2023-05-17 NOTE — ASSESSMENT & PLAN NOTE
Asymptomatic  Continue Bystolic  Pending follow-up with cardiology at [address] [place] [id number]

## 2023-05-18 DIAGNOSIS — F41.1 GAD (GENERALIZED ANXIETY DISORDER): ICD-10-CM

## 2023-05-19 RX ORDER — CLONAZEPAM 1 MG/1
TABLET ORAL
Qty: 30 TABLET | Refills: 5 | Status: SHIPPED | OUTPATIENT
Start: 2023-05-19

## 2023-05-23 DIAGNOSIS — I10 BENIGN ESSENTIAL HYPERTENSION: ICD-10-CM

## 2023-05-23 RX ORDER — AMLODIPINE BESYLATE 5 MG/1
TABLET ORAL
Qty: 90 TABLET | Refills: 1 | Status: SHIPPED | OUTPATIENT
Start: 2023-05-23

## 2023-06-08 ENCOUNTER — CLINICAL SUPPORT (OUTPATIENT)
Dept: FAMILY MEDICINE CLINIC | Facility: CLINIC | Age: 68
End: 2023-06-08
Payer: MEDICARE

## 2023-06-08 ENCOUNTER — APPOINTMENT (OUTPATIENT)
Dept: LAB | Facility: CLINIC | Age: 68
End: 2023-06-08
Payer: MEDICARE

## 2023-06-08 DIAGNOSIS — D50.8 IRON DEFICIENCY ANEMIA SECONDARY TO INADEQUATE DIETARY IRON INTAKE: ICD-10-CM

## 2023-06-08 DIAGNOSIS — E53.8 VITAMIN B 12 DEFICIENCY: ICD-10-CM

## 2023-06-08 DIAGNOSIS — E78.2 MIXED HYPERLIPIDEMIA: ICD-10-CM

## 2023-06-08 DIAGNOSIS — E03.9 HYPOTHYROIDISM, UNSPECIFIED TYPE: ICD-10-CM

## 2023-06-08 DIAGNOSIS — E53.8 VITAMIN B 12 DEFICIENCY: Primary | Chronic | ICD-10-CM

## 2023-06-08 LAB
ALBUMIN SERPL BCP-MCNC: 4.2 G/DL (ref 3.5–5)
ALP SERPL-CCNC: 97 U/L (ref 46–116)
ALT SERPL W P-5'-P-CCNC: 42 U/L (ref 12–78)
ANION GAP SERPL CALCULATED.3IONS-SCNC: 2 MMOL/L (ref 4–13)
AST SERPL W P-5'-P-CCNC: 37 U/L (ref 5–45)
BASOPHILS # BLD AUTO: 0.03 THOUSANDS/ÂΜL (ref 0–0.1)
BASOPHILS NFR BLD AUTO: 0 % (ref 0–1)
BILIRUB SERPL-MCNC: 0.48 MG/DL (ref 0.2–1)
BUN SERPL-MCNC: 19 MG/DL (ref 5–25)
CALCIUM SERPL-MCNC: 9.5 MG/DL (ref 8.3–10.1)
CHLORIDE SERPL-SCNC: 107 MMOL/L (ref 96–108)
CHOLEST SERPL-MCNC: 189 MG/DL
CO2 SERPL-SCNC: 29 MMOL/L (ref 21–32)
CREAT SERPL-MCNC: 0.69 MG/DL (ref 0.6–1.3)
EOSINOPHIL # BLD AUTO: 0.16 THOUSAND/ÂΜL (ref 0–0.61)
EOSINOPHIL NFR BLD AUTO: 2 % (ref 0–6)
ERYTHROCYTE [DISTWIDTH] IN BLOOD BY AUTOMATED COUNT: 12.9 % (ref 11.6–15.1)
FERRITIN SERPL-MCNC: 362 NG/ML (ref 11–307)
GFR SERPL CREATININE-BSD FRML MDRD: 89 ML/MIN/1.73SQ M
GLUCOSE P FAST SERPL-MCNC: 102 MG/DL (ref 65–99)
HCT VFR BLD AUTO: 41.7 % (ref 34.8–46.1)
HDLC SERPL-MCNC: 52 MG/DL
HGB BLD-MCNC: 13.6 G/DL (ref 11.5–15.4)
IMM GRANULOCYTES # BLD AUTO: 0.02 THOUSAND/UL (ref 0–0.2)
IMM GRANULOCYTES NFR BLD AUTO: 0 % (ref 0–2)
IRON SATN MFR SERPL: 32 % (ref 15–50)
IRON SERPL-MCNC: 91 UG/DL (ref 50–170)
LDLC SERPL CALC-MCNC: 97 MG/DL (ref 0–100)
LYMPHOCYTES # BLD AUTO: 1.58 THOUSANDS/ÂΜL (ref 0.6–4.47)
LYMPHOCYTES NFR BLD AUTO: 23 % (ref 14–44)
MCH RBC QN AUTO: 30.2 PG (ref 26.8–34.3)
MCHC RBC AUTO-ENTMCNC: 32.6 G/DL (ref 31.4–37.4)
MCV RBC AUTO: 93 FL (ref 82–98)
MONOCYTES # BLD AUTO: 0.45 THOUSAND/ÂΜL (ref 0.17–1.22)
MONOCYTES NFR BLD AUTO: 6 % (ref 4–12)
NEUTROPHILS # BLD AUTO: 4.78 THOUSANDS/ÂΜL (ref 1.85–7.62)
NEUTS SEG NFR BLD AUTO: 69 % (ref 43–75)
NRBC BLD AUTO-RTO: 0 /100 WBCS
PLATELET # BLD AUTO: 221 THOUSANDS/UL (ref 149–390)
PMV BLD AUTO: 9.8 FL (ref 8.9–12.7)
POTASSIUM SERPL-SCNC: 4.6 MMOL/L (ref 3.5–5.3)
PROT SERPL-MCNC: 8.1 G/DL (ref 6.4–8.4)
RBC # BLD AUTO: 4.5 MILLION/UL (ref 3.81–5.12)
SODIUM SERPL-SCNC: 138 MMOL/L (ref 135–147)
TIBC SERPL-MCNC: 288 UG/DL (ref 250–450)
TRIGL SERPL-MCNC: 200 MG/DL
TSH SERPL DL<=0.05 MIU/L-ACNC: 1.98 UIU/ML (ref 0.45–4.5)
VIT B12 SERPL-MCNC: 282 PG/ML (ref 180–914)
WBC # BLD AUTO: 7.02 THOUSAND/UL (ref 4.31–10.16)

## 2023-06-08 PROCEDURE — 83540 ASSAY OF IRON: CPT

## 2023-06-08 PROCEDURE — 83550 IRON BINDING TEST: CPT

## 2023-06-08 PROCEDURE — 84443 ASSAY THYROID STIM HORMONE: CPT

## 2023-06-08 PROCEDURE — 80053 COMPREHEN METABOLIC PANEL: CPT

## 2023-06-08 PROCEDURE — 80061 LIPID PANEL: CPT

## 2023-06-08 PROCEDURE — 96372 THER/PROPH/DIAG INJ SC/IM: CPT | Performed by: FAMILY MEDICINE

## 2023-06-08 PROCEDURE — 82607 VITAMIN B-12: CPT

## 2023-06-08 PROCEDURE — 36415 COLL VENOUS BLD VENIPUNCTURE: CPT

## 2023-06-08 PROCEDURE — 85025 COMPLETE CBC W/AUTO DIFF WBC: CPT

## 2023-06-08 PROCEDURE — 82728 ASSAY OF FERRITIN: CPT

## 2023-06-08 RX ORDER — CYANOCOBALAMIN 1000 UG/ML
1000 INJECTION, SOLUTION INTRAMUSCULAR; SUBCUTANEOUS ONCE
Status: COMPLETED | OUTPATIENT
Start: 2023-06-08 | End: 2023-06-08

## 2023-06-08 RX ADMIN — CYANOCOBALAMIN 1000 MCG: 1000 INJECTION, SOLUTION INTRAMUSCULAR; SUBCUTANEOUS at 13:17

## 2023-06-14 ENCOUNTER — TELEPHONE (OUTPATIENT)
Dept: FAMILY MEDICINE CLINIC | Facility: CLINIC | Age: 68
End: 2023-06-14

## 2023-06-14 NOTE — TELEPHONE ENCOUNTER
Please contact patient  All blood work was normal aside from lower level of vitamin B12  She missed her B12 injection in May which might have contributed to this result  Please schedule patient's vitamin B12 injections every 2 weeks x 4 times and then back to every 3 to 4 weeks      Thank you

## 2023-07-07 DIAGNOSIS — M51.36 DDD (DEGENERATIVE DISC DISEASE), LUMBAR: ICD-10-CM

## 2023-07-08 RX ORDER — HYDROCODONE BITARTRATE AND ACETAMINOPHEN 10; 325 MG/1; MG/1
0.5 TABLET ORAL 2 TIMES DAILY PRN
Qty: 30 TABLET | Refills: 0 | Status: SHIPPED | OUTPATIENT
Start: 2023-07-08

## 2023-07-10 ENCOUNTER — CLINICAL SUPPORT (OUTPATIENT)
Dept: FAMILY MEDICINE CLINIC | Facility: CLINIC | Age: 68
End: 2023-07-10
Payer: MEDICARE

## 2023-07-10 DIAGNOSIS — E53.8 VITAMIN B 12 DEFICIENCY: Primary | ICD-10-CM

## 2023-07-10 PROCEDURE — 96372 THER/PROPH/DIAG INJ SC/IM: CPT | Performed by: FAMILY MEDICINE

## 2023-07-10 RX ORDER — CYANOCOBALAMIN 1000 UG/ML
1000 INJECTION, SOLUTION INTRAMUSCULAR; SUBCUTANEOUS
Status: SHIPPED | OUTPATIENT
Start: 2023-07-10

## 2023-07-10 RX ADMIN — CYANOCOBALAMIN 10000 MCG: 1000 INJECTION, SOLUTION INTRAMUSCULAR; SUBCUTANEOUS at 11:11

## 2023-07-21 DIAGNOSIS — L30.9 ECZEMA OF BOTH HANDS: ICD-10-CM

## 2023-08-14 DIAGNOSIS — E78.2 MIXED HYPERLIPIDEMIA: ICD-10-CM

## 2023-08-14 RX ORDER — ATORVASTATIN CALCIUM 40 MG/1
40 TABLET, FILM COATED ORAL
Qty: 54 TABLET | Refills: 1 | Status: SHIPPED | OUTPATIENT
Start: 2023-08-15

## 2023-08-15 ENCOUNTER — CLINICAL SUPPORT (OUTPATIENT)
Dept: FAMILY MEDICINE CLINIC | Facility: CLINIC | Age: 68
End: 2023-08-15
Payer: MEDICARE

## 2023-08-15 DIAGNOSIS — E53.8 VITAMIN B 12 DEFICIENCY: Primary | ICD-10-CM

## 2023-08-15 PROCEDURE — 96372 THER/PROPH/DIAG INJ SC/IM: CPT | Performed by: FAMILY MEDICINE

## 2023-08-15 RX ORDER — CYANOCOBALAMIN 1000 UG/ML
1000 INJECTION, SOLUTION INTRAMUSCULAR; SUBCUTANEOUS ONCE
Status: COMPLETED | OUTPATIENT
Start: 2023-08-15 | End: 2023-08-15

## 2023-08-15 RX ADMIN — CYANOCOBALAMIN 1000 MCG: 1000 INJECTION, SOLUTION INTRAMUSCULAR; SUBCUTANEOUS at 13:35

## 2023-08-25 DIAGNOSIS — E03.9 HYPOTHYROIDISM, UNSPECIFIED TYPE: ICD-10-CM

## 2023-08-25 RX ORDER — LEVOTHYROXINE SODIUM 0.12 MG/1
TABLET ORAL
Qty: 90 TABLET | Refills: 1 | Status: SHIPPED | OUTPATIENT
Start: 2023-08-25

## 2023-09-18 ENCOUNTER — CLINICAL SUPPORT (OUTPATIENT)
Dept: FAMILY MEDICINE CLINIC | Facility: CLINIC | Age: 68
End: 2023-09-18
Payer: MEDICARE

## 2023-09-18 DIAGNOSIS — E53.8 VITAMIN B 12 DEFICIENCY: Primary | ICD-10-CM

## 2023-09-18 DIAGNOSIS — Z23 INFLUENZA VACCINE NEEDED: ICD-10-CM

## 2023-09-18 PROCEDURE — 96372 THER/PROPH/DIAG INJ SC/IM: CPT | Performed by: FAMILY MEDICINE

## 2023-09-18 PROCEDURE — G0008 ADMIN INFLUENZA VIRUS VAC: HCPCS | Performed by: FAMILY MEDICINE

## 2023-09-18 PROCEDURE — 90662 IIV NO PRSV INCREASED AG IM: CPT | Performed by: FAMILY MEDICINE

## 2023-09-18 RX ORDER — CYANOCOBALAMIN 1000 UG/ML
1000 INJECTION, SOLUTION INTRAMUSCULAR; SUBCUTANEOUS ONCE
Status: COMPLETED | OUTPATIENT
Start: 2023-09-18 | End: 2023-09-18

## 2023-09-18 RX ADMIN — CYANOCOBALAMIN 1000 MCG: 1000 INJECTION, SOLUTION INTRAMUSCULAR; SUBCUTANEOUS at 13:40

## 2023-10-02 DIAGNOSIS — M51.36 DDD (DEGENERATIVE DISC DISEASE), LUMBAR: ICD-10-CM

## 2023-10-02 RX ORDER — HYDROCODONE BITARTRATE AND ACETAMINOPHEN 10; 325 MG/1; MG/1
0.5 TABLET ORAL 2 TIMES DAILY PRN
Qty: 30 TABLET | Refills: 0 | Status: SHIPPED | OUTPATIENT
Start: 2023-10-02

## 2023-10-19 ENCOUNTER — CLINICAL SUPPORT (OUTPATIENT)
Dept: FAMILY MEDICINE CLINIC | Facility: CLINIC | Age: 68
End: 2023-10-19
Payer: MEDICARE

## 2023-10-19 DIAGNOSIS — E53.8 VITAMIN B 12 DEFICIENCY: Primary | ICD-10-CM

## 2023-10-19 PROCEDURE — 96372 THER/PROPH/DIAG INJ SC/IM: CPT

## 2023-10-19 RX ORDER — CYANOCOBALAMIN 1000 UG/ML
1000 INJECTION, SOLUTION INTRAMUSCULAR; SUBCUTANEOUS ONCE
Status: COMPLETED | OUTPATIENT
Start: 2023-10-19 | End: 2023-10-19

## 2023-10-19 RX ADMIN — CYANOCOBALAMIN 1000 MCG: 1000 INJECTION, SOLUTION INTRAMUSCULAR; SUBCUTANEOUS at 14:00

## 2023-11-22 ENCOUNTER — CLINICAL SUPPORT (OUTPATIENT)
Dept: FAMILY MEDICINE CLINIC | Facility: CLINIC | Age: 68
End: 2023-11-22
Payer: MEDICARE

## 2023-11-22 DIAGNOSIS — F41.1 GAD (GENERALIZED ANXIETY DISORDER): ICD-10-CM

## 2023-11-22 DIAGNOSIS — E53.8 VITAMIN B 12 DEFICIENCY: Primary | Chronic | ICD-10-CM

## 2023-11-22 PROCEDURE — 96372 THER/PROPH/DIAG INJ SC/IM: CPT | Performed by: FAMILY MEDICINE

## 2023-11-22 RX ORDER — CYANOCOBALAMIN 1000 UG/ML
1000 INJECTION, SOLUTION INTRAMUSCULAR; SUBCUTANEOUS ONCE
Status: COMPLETED | OUTPATIENT
Start: 2023-11-22 | End: 2023-11-22

## 2023-11-22 RX ADMIN — CYANOCOBALAMIN 1000 MCG: 1000 INJECTION, SOLUTION INTRAMUSCULAR; SUBCUTANEOUS at 11:19

## 2023-11-24 RX ORDER — CLONAZEPAM 1 MG/1
TABLET ORAL
Qty: 30 TABLET | Refills: 5 | Status: SHIPPED | OUTPATIENT
Start: 2023-11-24

## 2023-11-26 DIAGNOSIS — L30.9 ECZEMA OF BOTH HANDS: ICD-10-CM

## 2023-12-05 DIAGNOSIS — M51.36 DDD (DEGENERATIVE DISC DISEASE), LUMBAR: ICD-10-CM

## 2023-12-05 RX ORDER — HYDROCODONE BITARTRATE AND ACETAMINOPHEN 10; 325 MG/1; MG/1
0.5 TABLET ORAL 2 TIMES DAILY PRN
Qty: 30 TABLET | Refills: 0 | Status: SHIPPED | OUTPATIENT
Start: 2023-12-05

## 2023-12-12 ENCOUNTER — TELEPHONE (OUTPATIENT)
Dept: FAMILY MEDICINE CLINIC | Facility: CLINIC | Age: 68
End: 2023-12-12

## 2023-12-12 DIAGNOSIS — M51.9 DISC DISORDER OF LUMBAR REGION: ICD-10-CM

## 2023-12-12 DIAGNOSIS — M13.0 POLYARTICULAR ARTHRITIS: Primary | ICD-10-CM

## 2023-12-12 DIAGNOSIS — M51.36 DDD (DEGENERATIVE DISC DISEASE), LUMBAR: ICD-10-CM

## 2023-12-12 RX ORDER — HYDROCODONE BITARTRATE AND ACETAMINOPHEN 5; 325 MG/1; MG/1
1 TABLET ORAL 2 TIMES DAILY PRN
Qty: 30 TABLET | Refills: 0 | Status: SHIPPED | OUTPATIENT
Start: 2023-12-12

## 2023-12-12 RX ORDER — NALOXONE HYDROCHLORIDE 4 MG/.1ML
SPRAY NASAL
Qty: 1 EACH | Refills: 1 | Status: SHIPPED | OUTPATIENT
Start: 2023-12-12

## 2023-12-12 NOTE — TELEPHONE ENCOUNTER
Prescription for hydrocodone 5/325 mg tablets was sent to the pharmacy. Please let patient know.   Thank you

## 2023-12-12 NOTE — TELEPHONE ENCOUNTER
Patient called to state her pain medication (hydrocodone ) is on backorder and she has been without it since last Wednesday. Please advise.

## 2023-12-18 DIAGNOSIS — K21.9 GASTROESOPHAGEAL REFLUX DISEASE WITHOUT ESOPHAGITIS: ICD-10-CM

## 2023-12-18 RX ORDER — PANTOPRAZOLE SODIUM 40 MG/1
TABLET, DELAYED RELEASE ORAL
Qty: 30 TABLET | Refills: 11 | Status: SHIPPED | OUTPATIENT
Start: 2023-12-18

## 2023-12-22 ENCOUNTER — CLINICAL SUPPORT (OUTPATIENT)
Dept: FAMILY MEDICINE CLINIC | Facility: CLINIC | Age: 68
End: 2023-12-22
Payer: MEDICARE

## 2023-12-22 DIAGNOSIS — E53.8 VITAMIN B 12 DEFICIENCY: Primary | Chronic | ICD-10-CM

## 2023-12-22 PROCEDURE — 96372 THER/PROPH/DIAG INJ SC/IM: CPT | Performed by: FAMILY MEDICINE

## 2023-12-22 RX ORDER — CYANOCOBALAMIN 1000 UG/ML
1000 INJECTION, SOLUTION INTRAMUSCULAR; SUBCUTANEOUS ONCE
Status: COMPLETED | OUTPATIENT
Start: 2023-12-22 | End: 2023-12-22

## 2023-12-22 RX ADMIN — CYANOCOBALAMIN 1000 MCG: 1000 INJECTION, SOLUTION INTRAMUSCULAR; SUBCUTANEOUS at 11:14

## 2023-12-29 ENCOUNTER — RA CDI HCC (OUTPATIENT)
Dept: OTHER | Facility: HOSPITAL | Age: 68
End: 2023-12-29

## 2024-01-05 ENCOUNTER — APPOINTMENT (OUTPATIENT)
Dept: LAB | Facility: CLINIC | Age: 69
End: 2024-01-05
Payer: MEDICARE

## 2024-01-05 ENCOUNTER — OFFICE VISIT (OUTPATIENT)
Dept: FAMILY MEDICINE CLINIC | Facility: CLINIC | Age: 69
End: 2024-01-05
Payer: MEDICARE

## 2024-01-05 VITALS
OXYGEN SATURATION: 95 % | RESPIRATION RATE: 16 BRPM | SYSTOLIC BLOOD PRESSURE: 124 MMHG | WEIGHT: 146 LBS | HEART RATE: 58 BPM | BODY MASS INDEX: 28.66 KG/M2 | TEMPERATURE: 97.5 F | DIASTOLIC BLOOD PRESSURE: 78 MMHG | HEIGHT: 60 IN

## 2024-01-05 DIAGNOSIS — Z00.00 MEDICARE ANNUAL WELLNESS VISIT, SUBSEQUENT: Primary | ICD-10-CM

## 2024-01-05 DIAGNOSIS — M13.0 POLYARTICULAR ARTHRITIS: ICD-10-CM

## 2024-01-05 DIAGNOSIS — F11.20 CONTINUOUS OPIOID DEPENDENCE (HCC): ICD-10-CM

## 2024-01-05 DIAGNOSIS — E53.8 VITAMIN B 12 DEFICIENCY: Chronic | ICD-10-CM

## 2024-01-05 DIAGNOSIS — E78.2 MIXED HYPERLIPIDEMIA: ICD-10-CM

## 2024-01-05 DIAGNOSIS — M51.9 DISC DISORDER OF LUMBAR REGION: ICD-10-CM

## 2024-01-05 DIAGNOSIS — R10.12 LUQ PAIN: ICD-10-CM

## 2024-01-05 DIAGNOSIS — K21.9 GASTROESOPHAGEAL REFLUX DISEASE WITHOUT ESOPHAGITIS: ICD-10-CM

## 2024-01-05 DIAGNOSIS — M46.1 SACROILIITIS, NOT ELSEWHERE CLASSIFIED (HCC): ICD-10-CM

## 2024-01-05 DIAGNOSIS — I10 BENIGN ESSENTIAL HYPERTENSION: ICD-10-CM

## 2024-01-05 DIAGNOSIS — L98.9 SKIN LESION: ICD-10-CM

## 2024-01-05 DIAGNOSIS — F41.8 DEPRESSION WITH ANXIETY: Chronic | ICD-10-CM

## 2024-01-05 PROBLEM — F41.1 GAD (GENERALIZED ANXIETY DISORDER): Status: RESOLVED | Noted: 2020-07-19 | Resolved: 2024-01-05

## 2024-01-05 LAB
ALBUMIN SERPL BCP-MCNC: 4.8 G/DL (ref 3.5–5)
ALP SERPL-CCNC: 81 U/L (ref 34–104)
ALT SERPL W P-5'-P-CCNC: 24 U/L (ref 7–52)
ANION GAP SERPL CALCULATED.3IONS-SCNC: 15 MMOL/L
AST SERPL W P-5'-P-CCNC: 26 U/L (ref 13–39)
BILIRUB SERPL-MCNC: 0.65 MG/DL (ref 0.2–1)
BUN SERPL-MCNC: 18 MG/DL (ref 5–25)
CALCIUM SERPL-MCNC: 10 MG/DL (ref 8.4–10.2)
CHLORIDE SERPL-SCNC: 101 MMOL/L (ref 96–108)
CHOLEST SERPL-MCNC: 206 MG/DL
CO2 SERPL-SCNC: 26 MMOL/L (ref 21–32)
CREAT SERPL-MCNC: 0.69 MG/DL (ref 0.6–1.3)
ERYTHROCYTE [DISTWIDTH] IN BLOOD BY AUTOMATED COUNT: 12.9 % (ref 11.6–15.1)
GFR SERPL CREATININE-BSD FRML MDRD: 89 ML/MIN/1.73SQ M
GLUCOSE P FAST SERPL-MCNC: 96 MG/DL (ref 65–99)
HCT VFR BLD AUTO: 40.3 % (ref 34.8–46.1)
HDLC SERPL-MCNC: 53 MG/DL
HGB BLD-MCNC: 13.4 G/DL (ref 11.5–15.4)
LDLC SERPL CALC-MCNC: 110 MG/DL (ref 0–100)
LIPASE SERPL-CCNC: 23 U/L (ref 11–82)
MCH RBC QN AUTO: 30.5 PG (ref 26.8–34.3)
MCHC RBC AUTO-ENTMCNC: 33.3 G/DL (ref 31.4–37.4)
MCV RBC AUTO: 92 FL (ref 82–98)
PLATELET # BLD AUTO: 197 THOUSANDS/UL (ref 149–390)
PMV BLD AUTO: 9.4 FL (ref 8.9–12.7)
POTASSIUM SERPL-SCNC: 4 MMOL/L (ref 3.5–5.3)
PROT SERPL-MCNC: 8 G/DL (ref 6.4–8.4)
RBC # BLD AUTO: 4.39 MILLION/UL (ref 3.81–5.12)
SODIUM SERPL-SCNC: 142 MMOL/L (ref 135–147)
TRIGL SERPL-MCNC: 216 MG/DL
TSH SERPL DL<=0.05 MIU/L-ACNC: 0.47 UIU/ML (ref 0.45–4.5)
VIT B12 SERPL-MCNC: 397 PG/ML (ref 180–914)
WBC # BLD AUTO: 7.38 THOUSAND/UL (ref 4.31–10.16)

## 2024-01-05 PROCEDURE — 84443 ASSAY THYROID STIM HORMONE: CPT

## 2024-01-05 PROCEDURE — G0439 PPPS, SUBSEQ VISIT: HCPCS | Performed by: FAMILY MEDICINE

## 2024-01-05 PROCEDURE — 83690 ASSAY OF LIPASE: CPT

## 2024-01-05 PROCEDURE — 80053 COMPREHEN METABOLIC PANEL: CPT

## 2024-01-05 PROCEDURE — 36415 COLL VENOUS BLD VENIPUNCTURE: CPT

## 2024-01-05 PROCEDURE — 82607 VITAMIN B-12: CPT

## 2024-01-05 PROCEDURE — 80061 LIPID PANEL: CPT

## 2024-01-05 PROCEDURE — 85027 COMPLETE CBC AUTOMATED: CPT

## 2024-01-05 PROCEDURE — 99214 OFFICE O/P EST MOD 30 MIN: CPT | Performed by: FAMILY MEDICINE

## 2024-01-05 RX ORDER — PANTOPRAZOLE SODIUM 40 MG/1
40 TABLET, DELAYED RELEASE ORAL DAILY
Qty: 90 TABLET | Refills: 3 | Status: SHIPPED | OUTPATIENT
Start: 2024-01-05

## 2024-01-05 RX ORDER — AMLODIPINE BESYLATE 5 MG/1
5 TABLET ORAL DAILY
Qty: 90 TABLET | Refills: 3 | Status: SHIPPED | OUTPATIENT
Start: 2024-01-05

## 2024-01-05 RX ORDER — ATORVASTATIN CALCIUM 40 MG/1
40 TABLET, FILM COATED ORAL
Qty: 90 TABLET | Refills: 3 | Status: SHIPPED | OUTPATIENT
Start: 2024-01-06

## 2024-01-05 RX ORDER — HYDROCODONE BITARTRATE AND ACETAMINOPHEN 5; 325 MG/1; MG/1
1 TABLET ORAL 2 TIMES DAILY PRN
Qty: 60 TABLET | Refills: 0 | Status: SHIPPED | OUTPATIENT
Start: 2024-01-05

## 2024-01-05 NOTE — PROGRESS NOTES
Assessment and Plan:     Problem List Items Addressed This Visit        Digestive    Gastroesophageal reflux disease without esophagitis    Relevant Medications    pantoprazole (PROTONIX) 40 mg tablet    Other Relevant Orders    Ambulatory referral to Gastroenterology       Cardiovascular and Mediastinum    Benign essential hypertension     Blood pressure is well-controlled         Relevant Medications    amLODIPine (NORVASC) 5 mg tablet       Musculoskeletal and Integument    Disc disorder of lumbar region    Relevant Medications    HYDROcodone-acetaminophen (Norco) 5-325 mg per tablet    Polyarticular arthritis    Relevant Medications    HYDROcodone-acetaminophen (Norco) 5-325 mg per tablet    Sacroiliitis, not elsewhere classified (HCC)    Skin lesion     Irritated mole left groin and possible SK left upper arm.  Multiple pigmented moles.  Referral to dermatology         Relevant Orders    Ambulatory referral to Dermatology       Other    Depression with anxiety (Chronic)     Ongoing family related stress  Continue Zoloft 200 mg daily  Klonopin 0.5 mg qhs, patient rarely takes medication twice a day  She will contact me within next few months if symptoms worsen         Vitamin B 12 deficiency (Chronic)     Continue vitamin B12 injections every 3 to 4 weeks         Relevant Orders    Vitamin B12 (Completed)    Mixed hyperlipidemia    Relevant Medications    atorvastatin (LIPITOR) 40 mg tablet    Other Relevant Orders    CBC (Completed)    Comprehensive metabolic panel (Completed)    Lipid Panel with Direct LDL reflex (Completed)    TSH, 3rd generation (Completed)    Continuous opioid dependence (HCC)     Continue Vicodin for chronic severe arthritic/degenerative disc disease/scoliosis pain.  No history of misuse         LUQ pain     New onset of left upper quadrant pain, nonradiating.  Proceed with labs and abdominal ultrasound  Referral to [facility] gastro[facility]ology for consideration of EGD.  Patient is also  due for colorectal screening, Cologuard was ordered back in May 2023           Relevant Orders    US abdomen complete    Ambulatory referral to Gastroenterology    Lipase (Completed)   Other Visit Diagnoses     Medicare annual wellness visit, subsequent    -  Primary        Return in about 6 months (around 7/5/2024) for follow up.     Preventive health issues were discussed with patient, and age appropriate screening tests were ordered as noted in patient's After Visit Summary.  Personalized health advice and appropriate referrals for health education or preventive services given if needed, as noted in patient's After Visit Summary.     History of Present Illness:     Patient presents for a Medicare Wellness Visit    AWV / follow up  Intermittent LUQ pain, no radiation  No N/V  Worse after food, no nausea,on Pantoprazole daily  Appetite is decreased due to this pain    Patient is complaining of irritated skin growth left groin area and scaly given growth  left inner upper arm    She has been taking atorvastatin 4 to 5 days/week but is agreeable to increase dose to once daily due to recent dietary indiscretions.      Chronic arthritic pain.  Patient takes Vicodin as needed.  She is requesting to adjust her prescriptions to allow  PRN twice daily dosing.    Family related stress.  Intermittent symptoms of irritability.  Patient believes there is a situational and prefers to hold off long-term Rx changes.  She remains on Zoloft 200 mg daily and takes Klonopin 0.5 mg at night.  She uses Klonopin daytime very rarely due to sedating side effects and in that case avoids driving.           Patient Care Team:  Florence Barragan MD as PCP - General (Family Medicine)     Review of Systems:     Review of Systems   Constitutional: Negative.    HENT: Negative.     Eyes: Negative.    Respiratory: Negative.     Cardiovascular: Negative.    Gastrointestinal:  Positive for abdominal pain. Negative for nausea and vomiting.    Endocrine: Negative.    Genitourinary: Negative.    Musculoskeletal:  Positive for arthralgias, back pain and neck pain.   Skin: Negative.    Allergic/Immunologic: Negative.    Neurological: Negative.    Psychiatric/Behavioral:  The patient is nervous/anxious.         As per HPI        Problem List:     Patient Active Problem List   Diagnosis   • Benign essential hypertension   • Hypothyroidism   • Lumbosacral radiculitis   • Cervical stenosis of spinal canal   • Disc disorder of lumbar region   • Gastroesophageal reflux disease without esophagitis   • Seasonal allergic rhinitis due to pollen   • PSVT (paroxysmal supraventricular tachycardia)   • Iron deficiency anemia secondary to inadequate dietary iron intake   • Irritable bowel syndrome with diarrhea   • Depression with anxiety   • Mixed hyperlipidemia   • Scoliosis (and kyphoscoliosis), idiopathic   • Vitamin B 12 deficiency   • Calcification of left breast on mammography   • Obstructive sleep apnea syndrome   • Continuous opioid dependence (HCC)   • Impingement syndrome of left shoulder   • Chronic left hip pain   • Polyarticular arthritis   • Sacroiliitis, not elsewhere classified (HCC)   • Excessive daytime sleepiness   • Snoring   • Skin lesion   • LUQ pain      Past Medical and Surgical History:     Past Medical History:   Diagnosis Date   • Anemia     Iron and B-12 anemia   • Anxiety and depression    • Bleeding ulcer    • Disease of thyroid gland    • GERD (gastroesophageal reflux disease)    • Groin pain, right 2/3/2021   • Scoliosis    • Spinal meningioma (HCC)      Past Surgical History:   Procedure Laterality Date   • SPINAL CORD STIMULATOR IMPLANT     • SPINE SURGERY  1996     Bibiana pepper [facility]      Family History:     Family History   Problem Relation Age of Onset   • Heart disease Mother    • Skin cancer Mother         unsure of type   • Esophageal cancer Father    • Kidney cancer Father    • Thyroid disease Father    • Diabetes Son     • Skin cancer Sister         unsure of type   • Cervical cancer Maternal Grandmother    • No Known Problems Maternal Grandfather    • No Known Problems Paternal Grandmother    • No Known Problems Paternal Grandfather    • No Known Problems Sister    • Skin cancer Maternal Aunt         unsure of type   • No Known Problems Paternal Aunt    • No Known Problems Paternal Aunt       Social History:     Social History     Socioeconomic History   • Marital status:      Spouse name: None   • Number of children: None   • Years of education: None   • Highest education level: None   Occupational History   • None   Tobacco Use   • Smoking status: Never   • Smokeless tobacco: Never   Vaping Use   • Vaping status: Never Used   Substance and Sexual Activity   • Alcohol use: Yes     Comment: rare   • Drug use: No   • Sexual activity: Not Currently     Partners: Male   Other Topics Concern   • None   Social History Narrative        Retired    2 adult sons    Grandchildren     Social Determinants of Health     Financial Resource Strain: Low Risk  (1/5/2024)    Overall Financial Resource Strain (CARDIA)    • Difficulty of Paying Living Expenses: Not hard at all   Food Insecurity: Not on file   Transportation Needs: No Transportation Needs (1/5/2024)    PRAPARE - Transportation    • Lack of Transportation (Medical): No    • Lack of Transportation (Non-Medical): No   Physical Activity: Not on file   Stress: Not on file   Social Connections: Not on file   Intimate Partner Violence: Not on file   Housing Stability: Not on file      Medications and Allergies:     Current Outpatient Medications   Medication Sig Dispense Refill   • amLODIPine (NORVASC) 5 mg tablet Take 1 tablet (5 mg total) by mouth daily 90 tablet 3   • atorvastatin (LIPITOR) 40 mg tablet Take 1 tablet (40 mg total) by mouth 4 (four) times a week 90 tablet 3   • Calcium Carbonate-Vitamin D (CALCIUM CREAMIES PO) Take 600 mg by mouth daily     •  Cholecalciferol 25 MCG (1000 UT) capsule Take 2,000 Units by mouth     • clonazePAM (KlonoPIN) 1 mg tablet Take half a tablet twice a day as needed for anxiety 30 tablet 5   • dicyclomine (BENTYL) 20 mg tablet TAKE 1 TABLET BY MOUTH 2 TIMES A DAY AS NEEDED (ABDOMINAL BLOATING/PAIN) 180 tablet 1   • ferrous sulfate 325 (65 Fe) mg tablet Take by oral route.     • fluocinonide (LIDEX) 0.05 % cream APPLY TO AFFECTED AREA TWICE A DAY 60 g 1   • HYDROcodone-acetaminophen (Norco) 5-325 mg per tablet Take 1 tablet by mouth 2 (two) times a day as needed (low back pain) Max Daily Amount: 2 tablets 60 tablet 0   • levothyroxine 125 mcg tablet TAKE 1 TABLET BY MOUTH EVERY DAY 90 tablet 1   • naloxone (NARCAN) 4 mg/0.1 mL nasal spray Administer 1 spray into a nostril. If no response after 2-3 minutes, give another dose in the other nostril using a new spray. 1 each 1   • nebivolol (BYSTOLIC) 10 mg tablet TAKE 1 TABLET BY MOUTH EVERY DAY 90 tablet 3   • pantoprazole (PROTONIX) 40 mg tablet Take 1 tablet (40 mg total) by mouth daily 90 tablet 3   • sertraline (ZOLOFT) 100 mg tablet TAKE 2 TABLETS BY MOUTH EVERY  tablet 3   • tiZANidine (ZANAFLEX) 4 mg tablet TAKE 2 TABLETS BY MOUTH AT BEDTIME AS NEEDED FOR MUSCLE SPASMS 180 tablet 1     Current Facility-Administered Medications   Medication Dose Route Frequency Provider Last Rate Last Admin   • cyanocobalamin injection 1,000 mcg  1,000 mcg Intramuscular Q30 Days Florence Barragan MD   1,000 mcg at 02/21/18 1534   • cyanocobalamin injection 1,000 mcg  1,000 mcg Intramuscular Q30 Days Florence Barragan MD   1,000 mcg at 03/29/18 0717   • cyanocobalamin injection 1,000 mcg  1,000 mcg Intramuscular Q30 Days Florence Barragan MD   1,000 mcg at 04/30/18 1737   • cyanocobalamin injection 1,000 mcg  1,000 mcg Intramuscular Q30 Days Jun Canela MD   1,000 mcg at 08/10/18 1017   • cyanocobalamin injection 1,000 mcg  1,000 mcg Intramuscular Q30 Days Florence Barragan MD    1,000 mcg at 09/10/18 1127   • cyanocobalamin injection 1,000 mcg  1,000 mcg Intramuscular Q30 Days Florence Barragan MD   1,000 mcg at 02/11/19 0958   • cyanocobalamin injection 1,000 mcg  1,000 mcg Intramuscular Q30 Days Florence Barragan MD   1,000 mcg at 09/23/19 1739   • cyanocobalamin injection 1,000 mcg  1,000 mcg Intramuscular Q30 Days Florence Barragan MD   1,000 mcg at 10/29/19 2010   • cyanocobalamin injection 1,000 mcg  1,000 mcg Intramuscular Q30 Days Nataliya Townsend MD   1,000 mcg at 12/06/19 1513   • cyanocobalamin injection 1,000 mcg  1,000 mcg Intramuscular Q30 Days Florence Barragan MD   1,000 mcg at 07/02/20 1412   • cyanocobalamin injection 1,000 mcg  1,000 mcg Intramuscular Q30 Days Florence Barragan MD   1,000 mcg at 06/01/21 1100   • cyanocobalamin injection 1,000 mcg  1,000 mcg Intramuscular Q30 Days Florence Barragan MD   10,000 mcg at 07/10/23 1111     Allergies   Allergen Reactions   • Cephalosporins Other (See Comments)     GI Upset (vomit/diarrhea)   • Medical Tape Rash   • Metformin      Other reaction(s): Nausea and/or vomiting   • Nabumetone GI Intolerance   • Other GI Intolerance and Other (See Comments)   • Anesthesia S-I-40 [Propofol]    • Celecoxib    • Cephalexin    • Choline Magnesium Trisalicylate    • Diclofenac Other (See Comments)   • Diclofenac Potassium(Migraine) GI Intolerance   • Diclofenac Sodium GI Intolerance   • Fentanyl Other (See Comments)   • Propoxyphene Other (See Comments)     unknown   • Sulfa Antibiotics    • Sulfacetamide    • Zolpidem Other (See Comments)      Immunizations:     Immunization History   Administered Date(s) Administered   • COVID-19 MODERNA VACC 0.5 ML IM 03/31/2021, 04/28/2021   • INFLUENZA 10/08/2007, 10/24/2008, 10/13/2009, 10/17/2011, 10/13/2012, 10/21/2013, 11/05/2014, 10/02/2015, 10/13/2016, 10/10/2017, 10/10/2017, 10/28/2022, 09/18/2023   • Influenza, high dose seasonal 0.7 mL 10/28/2022, 09/18/2023   • Influenza,  recombinant, quadrivalent,injectable, preservative free 10/10/2018   • Pneumococcal Conjugate 13-Valent 05/09/2019   • Pneumococcal Conjugate Vaccine 20-valent (Pcv20), Polysace 11/15/2022   • Td (adult), Unspecified 08/12/1993   • Tdap 02/17/2008, 04/22/2019   • Zoster 04/29/2014      Health Maintenance:         Topic Date Due   • DXA SCAN  Never done   • Breast Cancer Screening: Mammogram  10/28/2022   • Colorectal Cancer Screening  08/28/2023   • Cervical Cancer Screening  06/30/2024   • Hepatitis C Screening  Completed         Topic Date Due   • COVID-19 Vaccine (3 - 2023-24 season) 09/01/2023      Medicare Screening Tests and Risk Assessments:     Yahaira is here for her Subsequent Wellness visit. Last Medicare Wellness visit information reviewed, patient interviewed and updates made to the record today.      Health Risk Assessment:   Patient rates overall health as good. Patient feels that their physical health rating is slightly better. Patient is very satisfied with their life. Eyesight was rated as slightly worse. Hearing was rated as slightly worse. Patient feels that their emotional and mental health rating is slightly better. Patients states they are never, rarely angry. Patient states they are never, rarely unusually tired/fatigued. Pain experienced in the last 7 days has been a lot. Patient's pain rating has been 7/10. Patient states that she has experienced no weight loss or gain in last 6 months.     Depression Screening:   PHQ-9 Score: 1      Fall Risk Screening:   In the past year, patient has experienced: history of falling in past year    Number of falls: 2 or more  Injured during fall?: No    Feels unsteady when standing or walking?: No    Worried about falling?: No      Urinary Incontinence Screening:   Patient has leaked urine accidently in the last six months.     Home Safety:  Patient has trouble with stairs inside or outside of their home. Patient has working smoke alarms and has working  carbon monoxide detector. Home safety hazards include: none.     Nutrition:   Current diet is Regular.     Medications:   Patient is currently taking over-the-counter supplements. OTC medications include: see medication list. Patient is able to manage medications.     Activities of Daily Living (ADLs)/Instrumental Activities of Daily Living (IADLs):   Walk and transfer into and out of bed and chair?: Yes  Dress and groom yourself?: Yes    Bathe or shower yourself?: Yes    Feed yourself? Yes  Do your laundry/housekeeping?: Yes  Manage your money, pay your bills and track your expenses?: Yes  Make your own meals?: Yes    Do your own shopping?: Yes    Previous Hospitalizations:   Any hospitalizations or ED visits within the last 12 months?: No      Advance Care Planning:   Living will: Yes    Durable POA for healthcare: No    Advanced directive: Yes      Cognitive Screening:   Provider or family/friend/caregiver concerned regarding cognition?: No    PREVENTIVE SCREENINGS      Cardiovascular Screening:    General: Screening Not Indicated and History Lipid Disorder      Diabetes Screening:     General: Screening Current    Due for: Blood Glucose      Colorectal Cancer Screening:     General: Screening Current      Breast Cancer Screening:     General: Screening Current    Due for: Mammogram        Cervical Cancer Screening:    General: Screening Not Indicated      Osteoporosis Screening:    General: Risks and Benefits Discussed    Due for: DXA Axial      Abdominal Aortic Aneurysm (AAA) Screening:        General: Screening Not Indicated      Lung Cancer Screening:     General: Screening Not Indicated      Hepatitis C Screening:    General: Screening Current    Screening, Brief Intervention, and Referral to Treatment (SBIRT)    Screening  Typical number of drinks in a day: 0  Typical number of drinks in a week: 0  Interpretation: Low risk drinking behavior.    Single Item Drug Screening:  How often have you used an illegal  drug (including marijuana) or a prescription medication for non-medical reasons in the past year? never    Single Item Drug Screen Score: 0  Interpretation: Negative screen for possible drug use disorder    Brief Intervention  Alcohol & drug use screenings were reviewed. No concerns regarding substance use disorder identified.     Review of Current Opioid Use    Opioid Risk Tool (ORT) Interpretation: Complete Opioid Risk Tool (ORT)    Other Counseling Topics:   Regular weightbearing exercise.     No results found.     Physical Exam:     /78   Pulse 58   Temp 97.5 °F (36.4 °C) (Temporal)   Resp 16   Ht 5' (1.524 m)   Wt 66.2 kg (146 lb)   SpO2 95%   BMI 28.51 kg/m²     Physical Exam  Vitals and nursing note reviewed.   Constitutional:       General: She is not in acute distress.     Appearance: Normal appearance. She is well-developed. She is not ill-appearing.   HENT:      Head: Normocephalic and atraumatic.   Eyes:      General: No scleral icterus.     Conjunctiva/sclera: Conjunctivae normal.   Neck:      Vascular: No carotid bruit.   Cardiovascular:      Rate and Rhythm: Normal rate and regular rhythm.      Heart sounds: Normal heart sounds. No murmur heard.  Pulmonary:      Effort: Pulmonary effort is normal. No respiratory distress.      Breath sounds: Normal breath sounds.   Abdominal:      General: Bowel sounds are normal. There is no distension or abdominal bruit.      Palpations: Abdomen is soft.      Tenderness: There is abdominal tenderness in the left upper quadrant. There is no guarding or rebound.   Musculoskeletal:      Cervical back: Neck supple. No rigidity.      Right lower leg: No edema.      Left lower leg: No edema.   Skin:     General: Skin is warm.   Neurological:      General: No focal deficit present.      Mental Status: She is alert and oriented to person, place, and time.   Psychiatric:         Mood and Affect: Mood normal.         Behavior: Behavior normal.         Thought  Content: Thought content normal.          Florence Barragan MD

## 2024-01-05 NOTE — PATIENT INSTRUCTIONS
Medicare Preventive Visit Patient Instructions  Thank you for completing your Welcome to Medicare Visit or Medicare Annual Wellness Visit today. Your next wellness visit will be due in one year (1/5/2025).  The screening/preventive services that you may require over the next 5-10 years are detailed below. Some tests may not apply to you based off risk factors and/or age. Screening tests ordered at today's visit but not completed yet may show as past due. Also, please note that scanned in results may not display below.  Preventive Screenings:  Service Recommendations Previous Testing/Comments   Colorectal Cancer Screening  * Colonoscopy    * Fecal Occult Blood Test (FOBT)/Fecal Immunochemical Test (FIT)  * Fecal DNA/Cologuard Test  * Flexible Sigmoidoscopy Age: 45-75 years old   Colonoscopy: every 10 years (may be performed more frequently if at higher risk)  OR  FOBT/FIT: every 1 year  OR  Cologuard: every 3 years  OR  Sigmoidoscopy: every 5 years  Screening may be recommended earlier than age 45 if at higher risk for colorectal cancer. Also, an individualized decision between you and your healthcare provider will decide whether screening between the ages of 76-85 would be appropriate. Colonoscopy: 08/29/2013  FOBT/FIT: Not on file  Cologuard: Not on file  Sigmoidoscopy: Not on file    Screening Current     Breast Cancer Screening Age: 40+ years old  Frequency: every 1-2 years  Not required if history of left and right mastectomy Mammogram: 05/25/2021    Screening Current  Due for Mammogram   Cervical Cancer Screening Between the ages of 21-29, pap smear recommended once every 3 years.   Between the ages of 30-65, can perform pap smear with HPV co-testing every 5 years.   Recommendations may differ for women with a history of total hysterectomy, cervical cancer, or abnormal pap smears in past. Pap Smear: 06/01/2022    Screening Not Indicated   Hepatitis C Screening Once for adults born between 1945 and 1965  More  frequently in patients at high risk for Hepatitis C Hep C Antibody: 02/21/2017    Screening Current   Diabetes Screening 1-2 times per year if you're at risk for diabetes or have pre-diabetes Fasting glucose: 102 mg/dL (6/8/2023)  A1C: No results in last 5 years (No results in last 5 years)  Screening Current   Cholesterol Screening Once every 5 years if you don't have a lipid disorder. May order more often based on risk factors. Lipid panel: 06/08/2023    Screening Not Indicated  History Lipid Disorder     Other Preventive Screenings Covered by Medicare:  Abdominal Aortic Aneurysm (AAA) Screening: covered once if your at risk. You're considered to be at risk if you have a family history of AAA.  Lung Cancer Screening: covers low dose CT scan once per year if you meet all of the following conditions: (1) Age 55-77; (2) No signs or symptoms of lung cancer; (3) Current smoker or have quit smoking within the last 15 years; (4) You have a tobacco smoking history of at least 20 pack years (packs per day multiplied by number of years you smoked); (5) You get a written order from a healthcare provider.  Glaucoma Screening: covered annually if you're considered high risk: (1) You have diabetes OR (2) Family history of glaucoma OR (3)  aged 50 and older OR (4)  American aged 65 and older  Osteoporosis Screening: covered every 2 years if you meet one of the following conditions: (1) You're estrogen deficient and at risk for osteoporosis based off medical history and other findings; (2) Have a vertebral abnormality; (3) On glucocorticoid therapy for more than 3 months; (4) Have primary hyperparathyroidism; (5) On osteoporosis medications and need to assess response to drug therapy.   Last bone density test (DXA Scan): Not on file.  HIV Screening: covered annually if you're between the age of 15-65. Also covered annually if you are younger than 15 and older than 65 with risk factors for HIV infection.  For pregnant patients, it is covered up to 3 times per pregnancy.    Immunizations:  Immunization Recommendations   Influenza Vaccine Annual influenza vaccination during flu season is recommended for all persons aged >= 6 months who do not have contraindications   Pneumococcal Vaccine   * Pneumococcal conjugate vaccine = PCV13 (Prevnar 13), PCV15 (Vaxneuvance), PCV20 (Prevnar 20)  * Pneumococcal polysaccharide vaccine = PPSV23 (Pneumovax) Adults 19-65 yo with certain risk factors or if 65+ yo  If never received any pneumonia vaccine: recommend Prevnar 20 (PCV20)  Give PCV20 if previously received 1 dose of PCV13 or PPSV23   Hepatitis B Vaccine 3 dose series if at intermediate or high risk (ex: diabetes, end stage renal disease, liver disease)   Respiratory syncytial virus (RSV) Vaccine - COVERED BY MEDICARE PART D  * RSVPreF3 (Arexvy) CDC recommends that adults 60 years of age and older may receive a single dose of RSV vaccine using shared clinical decision-making (SCDM)   Tetanus (Td) Vaccine - COST NOT COVERED BY MEDICARE PART B Following completion of primary series, a booster dose should be given every 10 years to maintain immunity against tetanus. Td may also be given as tetanus wound prophylaxis.   Tdap Vaccine - COST NOT COVERED BY MEDICARE PART B Recommended at least once for all adults. For pregnant patients, recommended with each pregnancy.   Shingles Vaccine (Shingrix) - COST NOT COVERED BY MEDICARE PART B  2 shot series recommended in those 19 years and older who have or will have weakened immune systems or those 50 years and older     Health Maintenance Due:      Topic Date Due   • DXA SCAN  Never done   • Breast Cancer Screening: Mammogram  10/28/2022   • Colorectal Cancer Screening  08/28/2023   • Cervical Cancer Screening  06/30/2024   • Hepatitis C Screening  Completed     Immunizations Due:      Topic Date Due   • COVID-19 Vaccine (3 - 2023-24 season) 09/01/2023     Advance Directives   What are  advance directives?  Advance directives are legal documents that state your wishes and plans for medical care. These plans are made ahead of time in case you lose your ability to make decisions for yourself. Advance directives can apply to any medical decision, such as the treatments you want, and if you want to donate organs.   What are the types of advance directives?  There are many types of advance directives, and each state has rules about how to use them. You may choose a combination of any of the following:  Living will:  This is a written record of the treatment you want. You can also choose which treatments you do not want, which to limit, and which to stop at a certain time. This includes surgery, medicine, IV fluid, and tube feedings.   Durable power of  for healthcare (DPAHC):  This is a written record that states who you want to make healthcare choices for you when you are unable to make them for yourself. This person, called a proxy, is usually a family member or a friend. You may choose more than 1 proxy.  Do not resuscitate (DNR) order:  A DNR order is used in case your heart stops beating or you stop breathing. It is a request not to have certain forms of treatment, such as CPR. A DNR order may be included in other types of advance directives.  Medical directive:  This covers the care that you want if you are in a coma, near death, or unable to make decisions for yourself. You can list the treatments you want for each condition. Treatment may include pain medicine, surgery, blood transfusions, dialysis, IV or tube feedings, and a ventilator (breathing machine).  Values history:  This document has questions about your views, beliefs, and how you feel and think about life. This information can help others choose the care that you would choose.  Why are advance directives important?  An advance directive helps you control your care. Although spoken wishes may be used, it is better to have your  wishes written down. Spoken wishes can be misunderstood, or not followed. Treatments may be given even if you do not want them. An advance directive may make it easier for your family to make difficult choices about your care.   Fall Prevention    Fall prevention  includes ways to make your home and other areas safer. It also includes ways you can move more carefully to prevent a fall. Health conditions that cause changes in your blood pressure, vision, or muscle strength and coordination may increase your risk for falls. Medicines may also increase your risk for falls if they make you dizzy, weak, or sleepy.   Fall prevention tips:   Stand or sit up slowly.    Use assistive devices as directed.    Wear shoes that fit well and have soles that .    Wear a personal alarm.    Stay active.    Manage your medical conditions.    Home Safety Tips:  Add items to prevent falls in the bathroom.    Keep paths clear.    Install bright lights in your home.    Keep items you use often on shelves within reach.    Paint or place reflective tape on the edges of your stairs.    Urinary Incontinence   Urinary incontinence (UI)  is when you lose control of your bladder. UI develops because your bladder cannot store or empty urine properly. The 3 most common types of UI are stress incontinence, urge incontinence, or both.  Medicines:   May be given to help strengthen your bladder control. Report any side effects of medication to your healthcare provider.  Do pelvic muscle exercises often:  Your pelvic muscles help you stop urinating. Squeeze these muscles tight for 5 seconds, then relax for 5 seconds. Gradually work up to squeezing for 10 seconds. Do 3 sets of 15 repetitions a day, or as directed. This will help strengthen your pelvic muscles and improve bladder control.  Train your bladder:  Go to the bathroom at set times, such as every 2 hours, even if you do not feel the urge to go. You can also try to hold your urine when you  feel the urge to go. For example, hold your urine for 5 minutes when you feel the urge to go. As that becomes easier, hold your urine for 10 minutes.   Self-care:   Keep a UI record.  Write down how often you leak urine and how much you leak. Make a note of what you were doing when you leaked urine.  Drink liquids as directed. You may need to limit the amount of liquid you drink to help control your urine leakage. Do not drink any liquid right before you go to bed. Limit or do not have drinks that contain caffeine or alcohol.   Prevent constipation.  Eat a variety of high-fiber foods. Good examples are high-fiber cereals, beans, vegetables, and whole-grain breads. Walking is the best way to trigger your intestines to have a bowel movement.  Exercise regularly and maintain a healthy weight.  Weight loss and exercise will decrease pressure on your bladder and help you control your leakage.   Use a catheter as directed  to help empty your bladder. A catheter is a tiny, plastic tube that is put into your bladder to drain your urine.   Go to behavior therapy as directed.  Behavior therapy may be used to help you learn to control your urge to urinate.    Weight Management   Why it is important to manage your weight:  Being overweight increases your risk of health conditions such as heart disease, high blood pressure, type 2 diabetes, and certain types of cancer. It can also increase your risk for osteoarthritis, sleep apnea, and other respiratory problems. Aim for a slow, steady weight loss. Even a small amount of weight loss can lower your risk of health problems.  How to lose weight safely:  A safe and healthy way to lose weight is to eat fewer calories and get regular exercise. You can lose up about 1 pound a week by decreasing the number of calories you eat by 500 calories each day.   Healthy meal plan for weight management:  A healthy meal plan includes a variety of foods, contains fewer calories, and helps you stay  healthy. A healthy meal plan includes the following:  Eat whole-grain foods more often.  A healthy meal plan should contain fiber. Fiber is the part of grains, fruits, and vegetables that is not broken down by your body. Whole-grain foods are healthy and provide extra fiber in your diet. Some examples of whole-grain foods are whole-wheat breads and pastas, oatmeal, brown rice, and bulgur.  Eat a variety of vegetables every day.  Include dark, leafy greens such as spinach, kale, dima greens, and mustard greens. Eat yellow and orange vegetables such as carrots, sweet potatoes, and winter squash.   Eat a variety of fruits every day.  Choose fresh or canned fruit (canned in its own juice or light syrup) instead of juice. Fruit juice has very little or no fiber.  Eat low-fat dairy foods.  Drink fat-free (skim) milk or 1% milk. Eat fat-free yogurt and low-fat cottage cheese. Try low-fat cheeses such as mozzarella and other reduced-fat cheeses.  Choose meat and other protein foods that are low in fat.  Choose beans or other legumes such as split peas or lentils. Choose fish, skinless poultry (chicken or turkey), or lean cuts of red meat (beef or pork). Before you cook meat or poultry, cut off any visible fat.   Use less fat and oil.  Try baking foods instead of frying them. Add less fat, such as margarine, sour cream, regular salad dressing and mayonnaise to foods. Eat fewer high-fat foods. Some examples of high-fat foods include french fries, doughnuts, ice cream, and cakes.  Eat fewer sweets.  Limit foods and drinks that are high in sugar. This includes candy, cookies, regular soda, and sweetened drinks.  Exercise:  Exercise at least 30 minutes per day on most days of the week. Some examples of exercise include walking, biking, dancing, and swimming. You can also fit in more physical activity by taking the stairs instead of the elevator or parking farther away from stores. Ask your healthcare provider about the best  exercise plan for you.   Narcotic (Opioid) Safety    Use narcotics safely:  Take prescribed narcotics exactly as directed  Do not give narcotics to others or take narcotics that belong to someone else  Do not mix narcotics without medicines or alcohol  Do not drive or operate heavy machinery after you take the narcotic  Monitor for side effects and notify your healthcare provider if you experienced side effects such as nausea, sleepiness, itching, or trouble thinking clearly.    Manage constipation:    Constipation is the most common side effect of narcotic medicine. Constipation is when you have hard, dry bowel movements, or you go longer than usual between bowel movements. Tell your healthcare provider about all changes in your bowel movements while you are taking narcotics. He or she may recommend laxative medicine to help you have a bowel movement. He or she may also change the kind of narcotic you are taking, or change when you take it. The following are more ways you can prevent or relieve constipation:    Drink liquids as directed.  You may need to drink extra liquids to help soften and move your bowels. Ask how much liquid to drink each day and which liquids are best for you.  Eat high-fiber foods.  This may help decrease constipation by adding bulk to your bowel movements. High-fiber foods include fruits, vegetables, whole-grain breads and cereals, and beans. Your healthcare provider or dietitian can help you create a high-fiber meal plan. Your provider may also recommend a fiber supplement if you cannot get enough fiber from food.  Exercise regularly.  Regular physical activity can help stimulate your intestines. Walking is a good exercise to prevent or relieve constipation. Ask which exercises are best for you.  Schedule a time each day to have a bowel movement.  This may help train your body to have regular bowel movements. Bend forward while you are on the toilet to help move the bowel movement out. Sit  on the toilet for at least 10 minutes, even if you do not have a bowel movement.    Store narcotics safely:   Store narcotics where others cannot easily get them.  Keep them in a locked cabinet or secure area. Do not  keep them in a purse or other bag you carry with you. A person may be looking for something else and find the narcotics.  Make sure narcotics are stored out of the reach of children.  A child can easily overdose on narcotics. Narcotics may look like candy to a small child.    The best way to dispose of narcotics:      The laws vary by country and area. In the [place], the best way is to return the narcotics through a take-back program. This program is offered by the [place] Drug Enforcement Agency ([employer]). The following are options for using the program:  Take the narcotics to a [employer] collection site.  The site is often a law enforcement center. Call your local law enforcement center for scheduled take-back days in your area. You will be given information on where to go if the collection site is in a different location.  Take the narcotics to an approved pharmacy or hospital.  A pharmacy or hospital may be set up as a collection site. You will need to ask if it is a [employer] collection site if you were not directed there. A pharmacy or doctor's office may not be able to take back narcotics unless it is a [employer] site.  Use a mail-back system.  This means you are given containers to put the narcotics into. You will then mail them in the containers.  Use a take-back drop box.  This is a place to leave the narcotics at any time. People and animals will not be able to get into the box. Your local law enforcement agency can tell you where to find a drop box in your area.    Other ways to manage pain:   Ask your healthcare provider about non-narcotic medicines to control pain.  Nonprescription medicines include NSAIDs (such as ibuprofen) and acetaminophen. Prescription medicines include muscle relaxers, antidepressants,  and steroids.  Pain may be managed without any medicines.  Some ways to relieve pain include massage, aromatherapy, or meditation. Physical or occupational therapy may also help.    For more information:   Drug Enforcement Administration  [address] [place] [address]  Phone: 5- 449 - 572-2088  Web Address: https://www.deadiversion.[facility].gov/drug_disposal/     Food and Drug Administration  [address] [place] [address]  Phone: 7- 225 - 292-6989  Web Address: http://www.fda.gov     © Copyright [employer] 2018 Information is for End User's use only and may not be sold, redistributed or otherwise used for commercial purposes. All illustrations and images included in CareNotes® are the copyrighted property of [employer] Inc[employer] or [employer]

## 2024-01-05 NOTE — ASSESSMENT & PLAN NOTE
Irritated mole left groin and possible SK left upper arm.  Multiple pigmented moles.  Referral to dermatology

## 2024-01-05 NOTE — ASSESSMENT & PLAN NOTE
Ongoing family related stress  Continue Zoloft 200 mg daily  Klonopin 0.5 mg qhs, patient rarely takes medication twice a day  She will contact me within next few months if symptoms worsen

## 2024-01-07 PROBLEM — R10.12 LUQ PAIN: Status: ACTIVE | Noted: 2024-01-07

## 2024-01-08 NOTE — ASSESSMENT & PLAN NOTE
New onset of left upper quadrant pain, nonradiating.  Proceed with labs and abdominal ultrasound  Referral to [facility] gastroenterology for consideration of EGD.  Patient is also due for colorectal screening, Cologuard was ordered back in May 2023

## 2024-01-08 NOTE — ASSESSMENT & PLAN NOTE
Continue Vicodin for chronic severe arthritic/degenerative disc disease/scoliosis pain.  No history of misuse

## 2024-01-09 ENCOUNTER — OFFICE VISIT (OUTPATIENT)
Dept: GASTROENTEROLOGY | Facility: CLINIC | Age: 69
End: 2024-01-09
Payer: MEDICARE

## 2024-01-09 VITALS
SYSTOLIC BLOOD PRESSURE: 130 MMHG | DIASTOLIC BLOOD PRESSURE: 76 MMHG | WEIGHT: 145 LBS | HEIGHT: 60 IN | BODY MASS INDEX: 28.47 KG/M2 | HEART RATE: 60 BPM

## 2024-01-09 DIAGNOSIS — R10.12 LUQ PAIN: ICD-10-CM

## 2024-01-09 DIAGNOSIS — K21.9 GASTROESOPHAGEAL REFLUX DISEASE WITHOUT ESOPHAGITIS: Primary | ICD-10-CM

## 2024-01-09 DIAGNOSIS — Z12.11 SCREENING FOR COLON CANCER: ICD-10-CM

## 2024-01-09 DIAGNOSIS — K58.0 IRRITABLE BOWEL SYNDROME WITH DIARRHEA: ICD-10-CM

## 2024-01-09 PROCEDURE — 99204 OFFICE O/P NEW MOD 45 MIN: CPT | Performed by: PHYSICIAN ASSISTANT

## 2024-01-09 NOTE — PATIENT INSTRUCTIONS
Scheduled date of EGD(as of today):03.01.24  Physician performing EGD:DR ARTEAGA  Location of EGD:[facility]  Instructions reviewed with patient by:RICO  Clearances: N/A

## 2024-01-09 NOTE — PROGRESS NOTES
[facility] - Outpatient Consultation  Yahaira Galloway 68 y.o. female MRN: [id number]  Encounter: [id number]          ASSESSMENT AND PLAN:      1. Gastroesophageal reflux disease without esophagitis  She has longstanding history of heartburn/regurgitation for 10+ years. She has been on pantoprazole 40 mg daily for years. This controls her reflux overall. She also avoids trigger foods like onions and peppers and also avoids late night eating.  Plan for EGD to evaluate for erosive reflux disease, hiatal hernia, Hardy's esophagus.    - Ambulatory referral to Gastroenterology  - EGD; Future    2. LUQ pain  Basic labs normal including CBC, CMP, lipase. Agree with complete abdominal ultrasound.  Schedule EGD to rule out gastritis / peptic ulcer disease in light of chronic NSAID use, H. pylori infection, erosive reflux disease. Continue pantoprazole 40 mg daily. Reviewed GERD diet and lifestyle precautions.    - Ambulatory referral to Gastroenterology  - EGD; Future    3. Screening for colon cancer  She is due for colonoscopy however patient politely declines. She had normal colonoscopy 10 years ago. She states she will complete Cologuard as ordered by PCP.    4. IBS-D  Longstanding history of irritable bowel syndrome. Diarrhea exacerbated by lactose, perhaps artificial sweeteners as well. Advised avoiding dairy and diet soda. Continue Imodium as needed. Start Metamucil daily for stool leakage. If no improvement, could refer to pelvic floor physical therapy due to associated urinary incontinence.    Follow-up in 2 months  ______________________________________________________________________    HPI:  68-year-old female with history of GERD, hypertension, polyarticular arthritis, sacroiliitis, continuous opioid dependence, depression and anxiety referred by PCP for GERD and left upper quadrant pain.    She describes persistent left upper quadrant pain for the past 3 weeks. The pain comes and goes  and feels like a poking, sticking sensation. She denies radiation of pain. She states the pain does worsen with eating. She denies nausea, vomiting, worsening reflux. She takes pantoprazole 40 mg daily. She has chronic postprandial diarrhea related to IBS-D. This is exacerbated by dairy so she avoids this or takes Lactaid pills. She leaks stool at times and feels like she cannot get clean. She also has some urinary incontinence as well and uses Depends. She denies rectal bleeding and abnormal weight loss.    She has never had EGD. Her father  of esophageal cancer.    She had colonoscopy in  with Dr. Lutz for change in bowel habits, diarrhea, left lower quadrant pain. This was normal except for nonbleeding AVMs in the right colon.    She is a non-smoker. She rarely drinks alcohol.    REVIEW OF SYSTEMS:    CONSTITUTIONAL: Denies any fever, chills, rigors, and weight loss.  HEENT: No earache or tinnitus. Denies hearing loss or visual disturbances.  CARDIOVASCULAR: No chest pain or palpitations.   RESPIRATORY: Denies any cough, hemoptysis, shortness of breath or dyspnea on exertion.  GASTROINTESTINAL: As noted in the History of Present Illness.   GENITOURINARY: No problems with urination. Denies any hematuria or dysuria.  NEUROLOGIC: No dizziness or vertigo, denies headaches.   MUSCULOSKELETAL: + Joint pain.   SKIN: Denies skin rashes or itching.   ENDOCRINE: Denies excessive thirst. Denies intolerance to heat or cold.  PSYCHOSOCIAL: + Anxiety. Denies any recent memory loss.       Historical Information   Past Medical History:   Diagnosis Date    Anemia     Iron and B-12 anemia    Anxiety and depression     Bleeding ulcer     Disease of thyroid gland     GERD (gastroesophageal reflux disease)     Groin pain, right 2/3/2021    Scoliosis     Spinal meningioma (HCC)      Past Surgical History:   Procedure Laterality Date    SPINAL CORD STIMULATOR IMPLANT      SPINE SURGERY       Bibiana pepper, [facility]      Social History   Social History     Substance and Sexual Activity   Alcohol Use Yes    Comment: rare     Social History     Substance and Sexual Activity   Drug Use No     Social History     Tobacco Use   Smoking Status Never   Smokeless Tobacco Never     Family History   Problem Relation Age of Onset    Heart disease Mother     Skin cancer Mother         unsure of type    Esophageal cancer Father     Kidney cancer Father     Thyroid disease Father     Diabetes Son     Skin cancer Sister         unsure of type    Cervical cancer Maternal Grandmother     No Known Problems Maternal Grandfather     No Known Problems Paternal Grandmother     No Known Problems Paternal Grandfather     No Known Problems Sister     Skin cancer Maternal Aunt         unsure of type    No Known Problems Paternal Aunt     No Known Problems Paternal Aunt        Meds/Allergies       Current Outpatient Medications:     amLODIPine (NORVASC) 5 mg tablet    atorvastatin (LIPITOR) 40 mg tablet    Calcium Carbonate-Vitamin D (CALCIUM CREAMIES PO)    Cholecalciferol 25 MCG (1000 UT) capsule    clonazePAM (KlonoPIN) 1 mg tablet    dicyclomine (BENTYL) 20 mg tablet    ferrous sulfate 325 (65 Fe) mg tablet    fluocinonide (LIDEX) 0.05 % cream    HYDROcodone-acetaminophen (Norco) 5-325 mg per tablet    levothyroxine 125 mcg tablet    naloxone (NARCAN) 4 mg/0.1 mL nasal spray    nebivolol (BYSTOLIC) 10 mg tablet    pantoprazole (PROTONIX) 40 mg tablet    sertraline (ZOLOFT) 100 mg tablet    tiZANidine (ZANAFLEX) 4 mg tablet    Current Facility-Administered Medications:     cyanocobalamin injection 1,000 mcg, 1,000 mcg, Intramuscular, Q30 Days, 1,000 mcg at 02/21/18 1534    cyanocobalamin injection 1,000 mcg, 1,000 mcg, Intramuscular, Q30 Days, 1,000 mcg at 03/29/18 0717    cyanocobalamin injection 1,000 mcg, 1,000 mcg, Intramuscular, Q30 Days, 1,000 mcg at 04/30/18 1737    cyanocobalamin injection 1,000 mcg, 1,000 mcg, Intramuscular, Q30 Days, 1,000 mcg  at 08/10/18 1017    cyanocobalamin injection 1,000 mcg, 1,000 mcg, Intramuscular, Q30 Days, 1,000 mcg at 09/10/18 1127    cyanocobalamin injection 1,000 mcg, 1,000 mcg, Intramuscular, Q30 Days, 1,000 mcg at 02/11/19 0958    cyanocobalamin injection 1,000 mcg, 1,000 mcg, Intramuscular, Q30 Days, 1,000 mcg at 09/23/19 1739    cyanocobalamin injection 1,000 mcg, 1,000 mcg, Intramuscular, Q30 Days, 1,000 mcg at 10/29/19 2010    cyanocobalamin injection 1,000 mcg, 1,000 mcg, Intramuscular, Q30 Days, 1,000 mcg at 12/06/19 1513    cyanocobalamin injection 1,000 mcg, 1,000 mcg, Intramuscular, Q30 Days, 1,000 mcg at 07/02/20 1412    cyanocobalamin injection 1,000 mcg, 1,000 mcg, Intramuscular, Q30 Days, 1,000 mcg at 06/01/21 1100    cyanocobalamin injection 1,000 mcg, 1,000 mcg, Intramuscular, Q30 Days, 10,000 mcg at 07/10/23 1111    Allergies   Allergen Reactions    Cephalosporins Other (See Comments)     GI Upset (vomit/diarrhea)    Medical Tape Rash    Metformin      Other reaction(s): Nausea and/or vomiting    Nabumetone GI Intolerance    Other GI Intolerance and Other (See Comments)    Anesthesia S-I-40 [Propofol]     Celecoxib     Cephalexin     Choline Magnesium Trisalicylate     Diclofenac Other (See Comments)    Diclofenac Potassium(Migraine) GI Intolerance    Diclofenac Sodium GI Intolerance    Fentanyl Other (See Comments)    Propoxyphene Other (See Comments)     unknown    Sulfa Antibiotics     Sulfacetamide     Zolpidem Other (See Comments)           Objective     not currently breastfeeding. There is no height or weight on file to calculate BMI.        PHYSICAL EXAM:      General Appearance:   Alert, cooperative, no distress   HEENT:   Normocephalic, atraumatic, anicteric.     Neck:  Supple, symmetrical, trachea midline   Lungs:   Clear to auscultation bilaterally; no rales, rhonchi or wheezing; respirations unlabored    Heart::   Regular rate and rhythm; no murmur, rub, or gallop.   Abdomen:   Soft,  non-tender, non-distended; normal bowel sounds; no masses, no organomegaly    Genitalia:   Deferred    Rectal:   Deferred    Extremities:  No cyanosis, clubbing or edema    Pulses:  2+ and symmetric    Skin:  No jaundice, rashes, or lesions    Lymph nodes:  No palpable cervical lymphadenopathy        Lab Results:   No visits with results within 1 Day(s) from this visit.   Latest known visit with results is:   Appointment on 01/05/2024   Component Date Value    WBC 01/05/2024 7.38     RBC 01/05/2024 4.39     Hemoglobin 01/05/2024 13.4     Hematocrit 01/05/2024 40.3     MCV 01/05/2024 92     MCH 01/05/2024 30.5     MCHC 01/05/2024 33.3     RDW 01/05/2024 12.9     Platelets 01/05/2024 197     MPV 01/05/2024 9.4     Sodium 01/05/2024 142     Potassium 01/05/2024 4.0     Chloride 01/05/2024 101     CO2 01/05/2024 26     ANION GAP 01/05/2024 15     BUN 01/05/2024 18     Creatinine 01/05/2024 0.69     Glucose, Fasting 01/05/2024 96     Calcium 01/05/2024 10.0     AST 01/05/2024 26     ALT 01/05/2024 24     Alkaline Phosphatase 01/05/2024 81     Total Protein 01/05/2024 8.0     Albumin 01/05/2024 4.8     Total Bilirubin 01/05/2024 0.65     eGFR 01/05/2024 89     Cholesterol 01/05/2024 206 (H)     Triglycerides 01/05/2024 216 (H)     HDL, Direct 01/05/2024 53     LDL Calculated 01/05/2024 110 (H)     TSH 3RD GENERATON 01/05/2024 0.465     Vitamin B-12 01/05/2024 397     Lipase 01/05/2024 23          Radiology Results:   No results found.

## 2024-01-11 ENCOUNTER — TELEPHONE (OUTPATIENT)
Dept: FAMILY MEDICINE CLINIC | Facility: CLINIC | Age: 69
End: 2024-01-11

## 2024-01-11 NOTE — TELEPHONE ENCOUNTER
----- Message from Florence Barragan MD sent at 1/10/2024  8:01 PM EST -----  Please contact patient.  All blood work was normal and stable aside from mild elevation of cholesterol and triglycerides.  We have already discussed it during office visit and decided that patient will start taking cholesterol medication every day. Thank you

## 2024-01-24 DIAGNOSIS — K58.0 IRRITABLE BOWEL SYNDROME WITH DIARRHEA: ICD-10-CM

## 2024-01-24 RX ORDER — DICYCLOMINE HCL 20 MG
TABLET ORAL
Qty: 60 TABLET | Refills: 5 | Status: SHIPPED | OUTPATIENT
Start: 2024-01-24

## 2024-02-15 ENCOUNTER — PATIENT MESSAGE (OUTPATIENT)
Dept: GASTROENTEROLOGY | Facility: CLINIC | Age: 69
End: 2024-02-15

## 2024-02-15 DIAGNOSIS — K58.0 IRRITABLE BOWEL SYNDROME WITH DIARRHEA: ICD-10-CM

## 2024-02-16 RX ORDER — DICYCLOMINE HCL 20 MG
TABLET ORAL
Qty: 180 TABLET | Refills: 2 | Status: SHIPPED | OUTPATIENT
Start: 2024-02-16

## 2024-02-20 ENCOUNTER — CLINICAL SUPPORT (OUTPATIENT)
Dept: FAMILY MEDICINE CLINIC | Facility: CLINIC | Age: 69
End: 2024-02-20
Payer: MEDICARE

## 2024-02-20 DIAGNOSIS — E53.8 B12 DEFICIENCY: Primary | ICD-10-CM

## 2024-02-20 DIAGNOSIS — I10 BENIGN ESSENTIAL HYPERTENSION: ICD-10-CM

## 2024-02-20 DIAGNOSIS — I47.10 PSVT (PAROXYSMAL SUPRAVENTRICULAR TACHYCARDIA): ICD-10-CM

## 2024-02-20 DIAGNOSIS — E03.9 HYPOTHYROIDISM, UNSPECIFIED TYPE: ICD-10-CM

## 2024-02-20 PROCEDURE — 96372 THER/PROPH/DIAG INJ SC/IM: CPT | Performed by: FAMILY MEDICINE

## 2024-02-20 RX ORDER — CYANOCOBALAMIN 1000 UG/ML
1000 INJECTION, SOLUTION INTRAMUSCULAR; SUBCUTANEOUS ONCE
Status: COMPLETED | OUTPATIENT
Start: 2024-02-20 | End: 2024-02-20

## 2024-02-20 RX ORDER — LEVOTHYROXINE SODIUM 0.12 MG/1
TABLET ORAL
Qty: 90 TABLET | Refills: 1 | Status: SHIPPED | OUTPATIENT
Start: 2024-02-20

## 2024-02-20 RX ORDER — NEBIVOLOL 10 MG/1
TABLET ORAL
Qty: 90 TABLET | Refills: 3 | Status: SHIPPED | OUTPATIENT
Start: 2024-02-20

## 2024-02-20 RX ADMIN — CYANOCOBALAMIN 1000 MCG: 1000 INJECTION, SOLUTION INTRAMUSCULAR; SUBCUTANEOUS at 15:16

## 2024-03-01 ENCOUNTER — ANESTHESIA (OUTPATIENT)
Dept: ANESTHESIOLOGY | Facility: HOSPITAL | Age: 69
End: 2024-03-01

## 2024-03-01 ENCOUNTER — ANESTHESIA EVENT (OUTPATIENT)
Dept: ANESTHESIOLOGY | Facility: HOSPITAL | Age: 69
End: 2024-03-01

## 2024-03-01 ENCOUNTER — HOSPITAL ENCOUNTER (OUTPATIENT)
Dept: GASTROENTEROLOGY | Facility: HOSPITAL | Age: 69
Setting detail: OUTPATIENT SURGERY
End: 2024-03-01
Payer: MEDICARE

## 2024-03-01 ENCOUNTER — ANESTHESIA (OUTPATIENT)
Dept: GASTROENTEROLOGY | Facility: HOSPITAL | Age: 69
End: 2024-03-01

## 2024-03-01 ENCOUNTER — ANESTHESIA EVENT (OUTPATIENT)
Dept: GASTROENTEROLOGY | Facility: HOSPITAL | Age: 69
End: 2024-03-01

## 2024-03-01 VITALS
SYSTOLIC BLOOD PRESSURE: 143 MMHG | BODY MASS INDEX: 29.06 KG/M2 | RESPIRATION RATE: 18 BRPM | WEIGHT: 148 LBS | HEIGHT: 60 IN | TEMPERATURE: 97.2 F | OXYGEN SATURATION: 96 % | HEART RATE: 56 BPM | DIASTOLIC BLOOD PRESSURE: 80 MMHG

## 2024-03-01 DIAGNOSIS — K21.9 GASTROESOPHAGEAL REFLUX DISEASE WITHOUT ESOPHAGITIS: ICD-10-CM

## 2024-03-01 DIAGNOSIS — R10.12 LUQ PAIN: ICD-10-CM

## 2024-03-01 PROBLEM — Z98.890 PONV (POSTOPERATIVE NAUSEA AND VOMITING): Status: ACTIVE | Noted: 2024-03-01

## 2024-03-01 PROBLEM — R11.2 PONV (POSTOPERATIVE NAUSEA AND VOMITING): Status: ACTIVE | Noted: 2024-03-01

## 2024-03-01 PROCEDURE — 43239 EGD BIOPSY SINGLE/MULTIPLE: CPT | Performed by: INTERNAL MEDICINE

## 2024-03-01 PROCEDURE — 88305 TISSUE EXAM BY PATHOLOGIST: CPT | Performed by: PATHOLOGY

## 2024-03-01 PROCEDURE — 88341 IMHCHEM/IMCYTCHM EA ADD ANTB: CPT | Performed by: PATHOLOGY

## 2024-03-01 PROCEDURE — 88342 IMHCHEM/IMCYTCHM 1ST ANTB: CPT | Performed by: PATHOLOGY

## 2024-03-01 RX ORDER — SODIUM CHLORIDE 9 MG/ML
INJECTION, SOLUTION INTRAVENOUS CONTINUOUS PRN
Status: DISCONTINUED | OUTPATIENT
Start: 2024-03-01 | End: 2024-03-01

## 2024-03-01 RX ORDER — OMEPRAZOLE 40 MG/1
40 CAPSULE, DELAYED RELEASE ORAL DAILY
Qty: 30 CAPSULE | Refills: 3 | Status: SHIPPED | OUTPATIENT
Start: 2024-03-01

## 2024-03-01 RX ORDER — OMEGA-3 FATTY ACIDS/FISH OIL 300-1000MG
CAPSULE ORAL
COMMUNITY

## 2024-03-01 RX ORDER — LIDOCAINE 4 G/G
PATCH TOPICAL
COMMUNITY

## 2024-03-01 RX ORDER — PROPOFOL 10 MG/ML
INJECTION, EMULSION INTRAVENOUS AS NEEDED
Status: DISCONTINUED | OUTPATIENT
Start: 2024-03-01 | End: 2024-03-01

## 2024-03-01 RX ADMIN — PROPOFOL 70 MG: 10 INJECTION, EMULSION INTRAVENOUS at 13:20

## 2024-03-01 RX ADMIN — PROPOFOL 30 MG: 10 INJECTION, EMULSION INTRAVENOUS at 13:24

## 2024-03-01 RX ADMIN — PROPOFOL 30 MG: 10 INJECTION, EMULSION INTRAVENOUS at 13:22

## 2024-03-01 RX ADMIN — PROPOFOL 50 MG: 10 INJECTION, EMULSION INTRAVENOUS at 13:27

## 2024-03-01 RX ADMIN — PROPOFOL 50 MG: 10 INJECTION, EMULSION INTRAVENOUS at 13:30

## 2024-03-01 RX ADMIN — SODIUM CHLORIDE: 0.9 INJECTION, SOLUTION INTRAVENOUS at 13:11

## 2024-03-01 NOTE — ANESTHESIA PREPROCEDURE EVALUATION
Procedure:  PRE-OP ONLY    Relevant Problems   CARDIO   (+) Benign essential hypertension   (+) Mixed hyperlipidemia   (+) PSVT (paroxysmal supraventricular tachycardia)      ENDO   (+) Hypothyroidism      GI/HEPATIC   (+) Gastroesophageal reflux disease without esophagitis      HEMATOLOGY   (+) Iron deficiency anemia secondary to inadequate dietary iron intake      MUSCULOSKELETAL   (+) Impingement syndrome of left shoulder   (+) Sacroiliitis, not elsewhere classified (HCC)   (+) Scoliosis (and kyphoscoliosis), idiopathic      NEURO/PSYCH   (+) Continuous opioid dependence (HCC)   (+) Depression with anxiety      PULMONARY   (+) Obstructive sleep apnea syndrome

## 2024-03-01 NOTE — H&P
History and Physical -  [facility]  Yahaira Galloway 68 y.o. female MRN: [id number]                  HPI: Yahaira Galloway is a 68 y.o. year old female who presents for EGD      REVIEW OF SYSTEMS: Per the HPI, and otherwise unremarkable.    Historical Information   Past Medical History:   Diagnosis Date    Anemia     Iron and B-12 anemia    Anxiety and depression     Bleeding ulcer     Disease of thyroid gland     Eczema     GERD (gastroesophageal reflux disease)     Groin pain, right 02/03/2021    Scoliosis     Spinal meningioma (HCC)      Past Surgical History:   Procedure Laterality Date    SPINAL CORD STIMULATOR IMPLANT      SPINE SURGERY  1996     [facility]     Social History   Social History     Substance and Sexual Activity   Alcohol Use Yes    Comment: rare     Social History     Substance and Sexual Activity   Drug Use No     Social History     Tobacco Use   Smoking Status Never   Smokeless Tobacco Never     Family History   Problem Relation Age of Onset    Heart disease Mother     Skin cancer Mother         unsure of type    Esophageal cancer Father     Kidney cancer Father     Thyroid disease Father     Diabetes Son     Skin cancer Sister         unsure of type    Cervical cancer Maternal Grandmother     No Known Problems Maternal Grandfather     No Known Problems Paternal Grandmother     No Known Problems Paternal Grandfather     No Known Problems Sister     Skin cancer Maternal Aunt         unsure of type    No Known Problems Paternal Aunt     No Known Problems Paternal Aunt        Meds/Allergies       Current Outpatient Medications:     amLODIPine (NORVASC) 5 mg tablet    atorvastatin (LIPITOR) 40 mg tablet    Calcium Carbonate-Vitamin D (CALCIUM CREAMIES PO)    clonazePAM (KlonoPIN) 1 mg tablet    dicyclomine (BENTYL) 20 mg tablet    ferrous sulfate 325 (65 Fe) mg tablet    HYDROcodone-acetaminophen (Norco) 5-325 mg per tablet    Ibuprofen (Advil) 200 MG CAPS     levothyroxine 125 mcg tablet    Lidocaine (HM Lidocaine Patch) 4 % PTCH    nebivolol (BYSTOLIC) 10 mg tablet    pantoprazole (PROTONIX) 40 mg tablet    sertraline (ZOLOFT) 100 mg tablet    Cholecalciferol 25 MCG (1000 UT) capsule    fluocinonide (LIDEX) 0.05 % cream    naloxone (NARCAN) 4 mg/0.1 mL nasal spray    tiZANidine (ZANAFLEX) 4 mg tablet    Current Facility-Administered Medications:     cyanocobalamin injection 1,000 mcg, 1,000 mcg, Intramuscular, Q30 Days, 1,000 mcg at 02/21/18 1534    cyanocobalamin injection 1,000 mcg, 1,000 mcg, Intramuscular, Q30 Days, 1,000 mcg at 03/29/18 0717    cyanocobalamin injection 1,000 mcg, 1,000 mcg, Intramuscular, Q30 Days, 1,000 mcg at 04/30/18 1737    cyanocobalamin injection 1,000 mcg, 1,000 mcg, Intramuscular, Q30 Days, 1,000 mcg at 08/10/18 1017    cyanocobalamin injection 1,000 mcg, 1,000 mcg, Intramuscular, Q30 Days, 1,000 mcg at 09/10/18 1127    cyanocobalamin injection 1,000 mcg, 1,000 mcg, Intramuscular, Q30 Days, 1,000 mcg at 02/11/19 0958    cyanocobalamin injection 1,000 mcg, 1,000 mcg, Intramuscular, Q30 Days, 1,000 mcg at 09/23/19 1739    cyanocobalamin injection 1,000 mcg, 1,000 mcg, Intramuscular, Q30 Days, 1,000 mcg at 10/29/19 2010    cyanocobalamin injection 1,000 mcg, 1,000 mcg, Intramuscular, Q30 Days, 1,000 mcg at 12/06/19 1513    cyanocobalamin injection 1,000 mcg, 1,000 mcg, Intramuscular, Q30 Days, 1,000 mcg at 07/02/20 1412    cyanocobalamin injection 1,000 mcg, 1,000 mcg, Intramuscular, Q30 Days, 1,000 mcg at 06/01/21 1100    cyanocobalamin injection 1,000 mcg, 1,000 mcg, Intramuscular, Q30 Days, 10,000 mcg at 07/10/23 1111    Allergies   Allergen Reactions    Cephalosporins Other (See Comments)     GI Upset (vomit/diarrhea)    Medical Tape Rash    Metformin      Other reaction(s): Nausea and/or vomiting    Nabumetone GI Intolerance    Other GI Intolerance and Other (See Comments)    Anesthesia S-I-40 [Propofol]     Celecoxib     Cephalexin      Choline Magnesium Trisalicylate     Diclofenac Other (See Comments)    Diclofenac Potassium(Migraine) GI Intolerance    Diclofenac Sodium GI Intolerance    Fentanyl Other (See Comments)    Propoxyphene Other (See Comments)     unknown    Sulfa Antibiotics     Sulfacetamide     Zolpidem Other (See Comments)       Objective     /74   Pulse 59   Temp (!) 97.2 °F (36.2 °C) (Temporal)   Resp 20   Ht 5' (1.524 m)   Wt 67.1 kg (148 lb)   SpO2 96%   BMI 28.90 kg/m²       PHYSICAL EXAM    Gen: NAD  Head: NCAT  CV: RRR  CHEST: Clear  ABD: soft, NT/ND  EXT: no edema      ASSESSMENT/PLAN:  This is a 68 y.o. year old female here for EGD, and she is stable and optimized for her procedure.

## 2024-03-01 NOTE — ANESTHESIA PREPROCEDURE EVALUATION
Procedure:  EGD    Relevant Problems   ANESTHESIA   (+) PONV (postoperative nausea and vomiting)      CARDIO   (+) Benign essential hypertension   (+) Mixed hyperlipidemia   (+) PSVT (paroxysmal supraventricular tachycardia)      ENDO   (+) Hypothyroidism      GI/HEPATIC   (+) Gastroesophageal reflux disease without esophagitis      HEMATOLOGY   (+) Iron deficiency anemia secondary to inadequate dietary iron intake      MUSCULOSKELETAL   (+) Impingement syndrome of left shoulder   (+) Sacroiliitis, not elsewhere classified (HCC)   (+) Scoliosis (and kyphoscoliosis), idiopathic      NEURO/PSYCH   (+) Continuous opioid dependence (HCC)   (+) Depression with anxiety      PULMONARY   (+) Obstructive sleep apnea syndrome    No CPAP  Spinal cord stimulator  H/o spinal meningioma  Physical Exam    Airway    Mallampati score: III  TM Distance: <3 FB  Neck ROM: full     Dental   Comment: None loose, No notable dental hx     Cardiovascular      Pulmonary      Other Findings  post-pubertal.      Anesthesia Plan  ASA Score- 2     Anesthesia Type- IV sedation with anesthesia with ASA Monitors.         Additional Monitors:     Airway Plan:     Comment: NPO after MN (sips of water with meds)    Patient educated on the possibility for awareness under sedation and of the possibility of airway intervention in the event of an airway or procedural emergency    Spinal cord stimulator hasn't been on in years  .       Plan Factors-Exercise tolerance (METS): <4 METS.    Chart reviewed.    Patient summary reviewed.    Patient is not a current smoker.              Induction- intravenous.    Postoperative Plan-     Informed Consent- Anesthetic plan and risks discussed with patient.  I personally reviewed this patient with the CRNA. Discussed and agreed on the Anesthesia Plan with the CRNA..

## 2024-03-01 NOTE — ANESTHESIA POSTPROCEDURE EVALUATION
Post-Op Assessment Note    CV Status:  Stable  Pain Score: 0    Pain management: adequate       Mental Status:  Alert and awake   Hydration Status:  Euvolemic   PONV Controlled:  Controlled   Airway Patency:  Patent     Post Op Vitals Reviewed: Yes    No anethesia notable event occurred.    Staff: CRNA               /62 (03/01/24 1331)    Temp      Pulse 66 (03/01/24 1331)   Resp 14 (03/01/24 1331)    SpO2 98 % (03/01/24 1331)

## 2024-03-06 PROCEDURE — 88341 IMHCHEM/IMCYTCHM EA ADD ANTB: CPT | Performed by: PATHOLOGY

## 2024-03-06 PROCEDURE — 88305 TISSUE EXAM BY PATHOLOGIST: CPT | Performed by: PATHOLOGY

## 2024-03-06 PROCEDURE — 88342 IMHCHEM/IMCYTCHM 1ST ANTB: CPT | Performed by: PATHOLOGY

## 2024-03-13 DIAGNOSIS — M51.9 DISC DISORDER OF LUMBAR REGION: ICD-10-CM

## 2024-03-13 DIAGNOSIS — M13.0 POLYARTICULAR ARTHRITIS: ICD-10-CM

## 2024-03-13 RX ORDER — HYDROCODONE BITARTRATE AND ACETAMINOPHEN 5; 325 MG/1; MG/1
1 TABLET ORAL 2 TIMES DAILY PRN
Qty: 60 TABLET | Refills: 0 | Status: SHIPPED | OUTPATIENT
Start: 2024-03-13

## 2024-03-17 DIAGNOSIS — F41.1 GAD (GENERALIZED ANXIETY DISORDER): ICD-10-CM

## 2024-03-18 RX ORDER — SERTRALINE HYDROCHLORIDE 100 MG/1
TABLET, FILM COATED ORAL
Qty: 180 TABLET | Refills: 3 | Status: SHIPPED | OUTPATIENT
Start: 2024-03-18

## 2024-03-21 ENCOUNTER — CLINICAL SUPPORT (OUTPATIENT)
Dept: FAMILY MEDICINE CLINIC | Facility: CLINIC | Age: 69
End: 2024-03-21
Payer: MEDICARE

## 2024-03-21 DIAGNOSIS — E53.8 VITAMIN B 12 DEFICIENCY: Primary | Chronic | ICD-10-CM

## 2024-03-21 DIAGNOSIS — M51.9 DISC DISORDER OF LUMBAR REGION: ICD-10-CM

## 2024-03-21 PROCEDURE — 96372 THER/PROPH/DIAG INJ SC/IM: CPT | Performed by: FAMILY MEDICINE

## 2024-03-21 RX ORDER — CYANOCOBALAMIN 1000 UG/ML
1000 INJECTION, SOLUTION INTRAMUSCULAR; SUBCUTANEOUS ONCE
Status: COMPLETED | OUTPATIENT
Start: 2024-03-21 | End: 2024-03-21

## 2024-03-21 RX ORDER — TIZANIDINE 4 MG/1
4 TABLET ORAL
Qty: 90 TABLET | Refills: 1 | Status: SHIPPED | OUTPATIENT
Start: 2024-03-21

## 2024-03-21 RX ADMIN — CYANOCOBALAMIN 1000 MCG: 1000 INJECTION, SOLUTION INTRAMUSCULAR; SUBCUTANEOUS at 13:50

## 2024-03-22 ENCOUNTER — TELEPHONE (OUTPATIENT)
Age: 69
End: 2024-03-22

## 2024-03-22 NOTE — TELEPHONE ENCOUNTER
Received call from patient stating she was returning our call to [facility] an appt.    Patient has ASAP refferal.    Patient was transferred to [facility] to further asist her in [facility] at the [facility]

## 2024-03-27 ENCOUNTER — OFFICE VISIT (OUTPATIENT)
Dept: GASTROENTEROLOGY | Facility: CLINIC | Age: 69
End: 2024-03-27
Payer: MEDICARE

## 2024-03-27 VITALS
WEIGHT: 144 LBS | BODY MASS INDEX: 28.27 KG/M2 | DIASTOLIC BLOOD PRESSURE: 60 MMHG | TEMPERATURE: 98.7 F | HEIGHT: 60 IN | SYSTOLIC BLOOD PRESSURE: 120 MMHG

## 2024-03-27 DIAGNOSIS — K58.0 IRRITABLE BOWEL SYNDROME WITH DIARRHEA: ICD-10-CM

## 2024-03-27 DIAGNOSIS — K29.40 ATROPHIC GASTRITIS WITHOUT HEMORRHAGE: Primary | ICD-10-CM

## 2024-03-27 DIAGNOSIS — K21.9 GASTROESOPHAGEAL REFLUX DISEASE WITHOUT ESOPHAGITIS: ICD-10-CM

## 2024-03-27 DIAGNOSIS — R10.12 LUQ PAIN: ICD-10-CM

## 2024-03-27 DIAGNOSIS — Z12.11 SCREENING FOR COLON CANCER: ICD-10-CM

## 2024-03-27 DIAGNOSIS — K31.7 GASTRIC POLYP: ICD-10-CM

## 2024-03-27 PROCEDURE — 99214 OFFICE O/P EST MOD 30 MIN: CPT | Performed by: PHYSICIAN ASSISTANT

## 2024-03-27 PROCEDURE — G2211 COMPLEX E/M VISIT ADD ON: HCPCS | Performed by: PHYSICIAN ASSISTANT

## 2024-03-27 RX ORDER — OMEPRAZOLE 40 MG/1
40 CAPSULE, DELAYED RELEASE ORAL DAILY
Qty: 90 CAPSULE | Refills: 1 | Status: SHIPPED | OUTPATIENT
Start: 2024-03-27

## 2024-03-27 NOTE — PATIENT INSTRUCTIONS
Scheduled date of EGD(as of today): 05/17/2024  Physician performing EGD: Dr. Vaughan  Location of EGD: [facility]  Instructions reviewed with patient by: Carina  Clearances: n/a

## 2024-03-27 NOTE — PROGRESS NOTES
[facility] - Outpatient Follow-up Note  Yahaira Galloway 68 y.o. female MRN: [id number]  Encounter: [id number]          ASSESSMENT AND PLAN:      1. Atrophic gastritis without hemorrhage  Patient states she was diagnosed with pernicious anemia in the past based on positive blood work and gets B12 injections on a monthly basis. Recent EGD with gastric biopsies showed evidence of chronic atrophic gastritis. I explained this is an autoimmune inflam stomach lining which leads to pernicious anemia. Continue monthly B12 injections. Recent CBC within normal limits    - EGD; Future    2. Gastric polyp  EGD showed 2 subcentimeter polyps in the cardia which were removed with cold forceps biopsy.  Biopsies showed hyperplastic polyp with detached inflammatory exudate. I explained this polyp is likely due to inflammation, but it would be advised to undergo repeat endoscopy to completely remove the polyp. She agreeable to scheduling EGD today.    - EGD; Future    3. LUQ pain  Her LUQ pain may be due to GERD or gastritis. Recent labs normal including CBC, CMP, lipase.  EGD was significant for hiatal hernia and gastritis. Continue omeprazole 40 mg daily. Reviewed GERD diet and lifestyle precautions. Recommend scheduling complete abdominal ultrasound.    4. Irritable bowel syndrome with diarrhea  She has longstanding history of IBS-D which has improved with limiting lactose and artificial sweeteners. Continue avoiding dairy and limiting diet soda.    5. Screening for colon cancer  She is due for colonoscopy however patient politely declines. She had normal colonoscopy over 10 years ago. She will complete Cologuard as ordered by PCP     Follow-up in 1 year  ______________________________________________________________________    SUBJECTIVE:  68-year-old female with history of GERD, hypertension, polyarticular arthritis, sacroiliitis, chronic back pain, continuous opioid dependence, depression and anxiety  referred by presenting for follow-up of GERD and left upper quadrant pain.    Her reflux has significantly improved with omeprazole 40 mg daily. She denies any difficulty swallowing, nausea, vomiting. She still has intermittent left upper quadrant pain. The pain comes and goes and feels like a poking, sticking sensation. She denies radiation of pain. She states the pain does worsen with eating. Her diarrhea has significantly improved since limiting dairy and diet soda. She denies rectal bleeding and abnormal weight loss.     She had colonoscopy in 2013 with Dr. Lutz for change in bowel habits, diarrhea, left lower quadrant pain. This was normal except for nonbleeding AVMs in the right colon.     She is a non-smoker. She rarely drinks alcohol.      REVIEW OF SYSTEMS IS OTHERWISE NEGATIVE.      Historical Information   Past Medical History:   Diagnosis Date    Anemia     Iron and B-12 anemia    Anxiety and depression     Bleeding ulcer     Disease of thyroid gland     Eczema     GERD (gastroesophageal reflux disease)     Groin pain, right 02/03/2021    Scoliosis     Spinal meningioma (HCC)      Past Surgical History:   Procedure Laterality Date    SPINAL CORD STIMULATOR IMPLANT      SPINE SURGERY  1996     Bibiana pepper [facility]     Social History   Social History     Substance and Sexual Activity   Alcohol Use Yes    Comment: rare     Social History     Substance and Sexual Activity   Drug Use No     Social History     Tobacco Use   Smoking Status Never   Smokeless Tobacco Never     Family History   Problem Relation Age of Onset    Heart disease Mother     Skin cancer Mother         unsure of type    Esophageal cancer Father     Kidney cancer Father     Thyroid disease Father     Diabetes Son     Skin cancer Sister         unsure of type    Cervical cancer Maternal Grandmother     No Known Problems Maternal Grandfather     No Known Problems Paternal Grandmother     No Known Problems Paternal Grandfather      No Known Problems Sister     Skin cancer Maternal Aunt         unsure of type    No Known Problems Paternal Aunt     No Known Problems Paternal Aunt        Meds/Allergies       Current Outpatient Medications:     dicyclomine (BENTYL) 20 mg tablet    ferrous sulfate 325 (65 Fe) mg tablet    fluocinonide (LIDEX) 0.05 % cream    HYDROcodone-acetaminophen (Norco) 5-325 mg per tablet    levothyroxine 125 mcg tablet    Lidocaine (HM Lidocaine Patch) 4 % PTCH    nebivolol (BYSTOLIC) 10 mg tablet    sertraline (ZOLOFT) 100 mg tablet    tiZANidine (ZANAFLEX) 4 mg tablet    amLODIPine (NORVASC) 5 mg tablet    atorvastatin (LIPITOR) 40 mg tablet    Calcium Carbonate-Vitamin D (CALCIUM CREAMIES PO)    Cholecalciferol 25 MCG (1000 UT) capsule    clonazePAM (KlonoPIN) 1 mg tablet    Ibuprofen (Advil) 200 MG CAPS    naloxone (NARCAN) 4 mg/0.1 mL nasal spray    omeprazole (PriLOSEC) 40 MG capsule    Current Facility-Administered Medications:     cyanocobalamin injection 1,000 mcg, 1,000 mcg, Intramuscular, Q30 Days, 1,000 mcg at 02/21/18 1534    cyanocobalamin injection 1,000 mcg, 1,000 mcg, Intramuscular, Q30 Days, 1,000 mcg at 03/29/18 0717    cyanocobalamin injection 1,000 mcg, 1,000 mcg, Intramuscular, Q30 Days, 1,000 mcg at 04/30/18 1737    cyanocobalamin injection 1,000 mcg, 1,000 mcg, Intramuscular, Q30 Days, 1,000 mcg at 08/10/18 1017    cyanocobalamin injection 1,000 mcg, 1,000 mcg, Intramuscular, Q30 Days, 1,000 mcg at 09/10/18 1127    cyanocobalamin injection 1,000 mcg, 1,000 mcg, Intramuscular, Q30 Days, 1,000 mcg at 02/11/19 0958    cyanocobalamin injection 1,000 mcg, 1,000 mcg, Intramuscular, Q30 Days, 1,000 mcg at 09/23/19 1739    cyanocobalamin injection 1,000 mcg, 1,000 mcg, Intramuscular, Q30 Days, 1,000 mcg at 10/29/19 2010    cyanocobalamin injection 1,000 mcg, 1,000 mcg, Intramuscular, Q30 Days, 1,000 mcg at 12/06/19 1513    cyanocobalamin injection 1,000 mcg, 1,000 mcg, Intramuscular, Q30 Days, 1,000 mcg at  07/02/20 1412    cyanocobalamin injection 1,000 mcg, 1,000 mcg, Intramuscular, Q30 Days, 1,000 mcg at 06/01/21 1100    cyanocobalamin injection 1,000 mcg, 1,000 mcg, Intramuscular, Q30 Days, 10,000 mcg at 07/10/23 1111    Allergies   Allergen Reactions    Cephalosporins Other (See Comments)     GI Upset (vomit/diarrhea)    Medical Tape Rash    Metformin      Other reaction(s): Nausea and/or vomiting    Nabumetone GI Intolerance    Other GI Intolerance and Other (See Comments)    Celecoxib     Cephalexin     Choline Magnesium Trisalicylate     Diclofenac Other (See Comments)    Diclofenac Potassium(Migraine) GI Intolerance    Diclofenac Sodium GI Intolerance    Fentanyl Other (See Comments)    Propoxyphene Other (See Comments)     unknown    Sulfa Antibiotics     Sulfacetamide     Zolpidem Other (See Comments)           Objective     Blood pressure 120/60, temperature 98.7 °F (37.1 °C), temperature source Tympanic, height 5' (1.524 m), weight 65.3 kg (144 lb), not currently breastfeeding. Body mass index is 28.12 kg/m².      PHYSICAL EXAM:      General Appearance:   Alert, cooperative, no distress   HEENT:   Normocephalic, atraumatic, anicteric.     Neck:  Supple, symmetrical, trachea midline   Lungs:   Clear to auscultation bilaterally; no rales, rhonchi or wheezing; respirations unlabored    Heart::   Regular rate and rhythm; no murmur, rub, or gallop.   Abdomen:   Soft, non-tender, non-distended; normal bowel sounds; no masses, no organomegaly    Genitalia:   Deferred    Rectal:   Deferred    Extremities:  No cyanosis, clubbing or edema    Pulses:  2+ and symmetric    Skin:  No jaundice, rashes, or lesions    Lymph nodes:  No palpable cervical lymphadenopathy        Lab Results:   No visits with results within 1 Day(s) from this visit.   Latest known visit with results is:   Hospital Outpatient Visit on 03/01/2024   Component Date Value    Case Report 03/01/2024                      Value:Surgical Pathology Report                          Case: [id number]                                  Authorizing Provider:  Ning Vaughan DO    Collected:           03/01/2024 1322              Ordering Location:     [facility]     Received:            03/01/2024 1637                                     [facility]                                                             Pathologist:           Shanta Amaya MD                                                                    Specimens:   A) - Stomach, gastritis                                                                             B) - Stomach, cardia polyp                                                                 Final Diagnosis 03/01/2024                      Value:This result contains rich text formatting which cannot be displayed here.    Additional Information 03/01/2024                      Value:This result contains rich text formatting which cannot be displayed here.    Gross Description 03/01/2024                      Value:This result contains rich text formatting which cannot be displayed here.    Clinical Information 03/01/2024                      Value:Per EGD,  INDICATION:  LUQ pain, Gastroesophageal reflux disease without esophagitis      FINDINGS:  · The esophagus appeared normal.  · 5 cm hiatal hernia - GE junction 33 cm from the incisors, diaphragmatic impression 38 cm from the incisors:  Hill classification: Grade IV  · Two polyps measuring smaller than 5 mm in the cardia; performed cold forceps biopsy  · Abnormal mucosa with erosion in the body of the stomach; performed cold forceps biopsy to rule out H. pylori  · Edematous mucosa with erosion in the duodenum; performed cold forceps biopsy           Radiology Results:   EGD    Result Date: 3/1/2024  Narrative: Table formatting from the original result was not included. [facility] [address] [facility] [id number] [phone] DATE OF SERVICE:  3/01/24 PHYSICIAN(S): Attending: Ning Vaughan DO Fellow: Nicole Wiseman MD INDICATION: LUQ pain, Gastroesophageal reflux disease without esophagitis POST-OP DIAGNOSIS: See the impression below. PREPROCEDURE: Informed consent was obtained for the procedure, including sedation.  Risks of perforation, hemorrhage, adverse drug reaction and aspiration were discussed. The patient was placed in the left lateral decubitus position. Patient was explained about the risks and benefits of the procedure. Risks including but not limited to bleeding, infection, and perforation were explained in detail. Also explained about less than 100% sensitivity with the exam and other alternatives. PROCEDURE: EGD DETAILS OF PROCEDURE: Patient was taken to the procedure room where a time out was performed to confirm correct patient and correct procedure. The patient underwent monitored anesthesia care, which was administered by an anesthesia professional. The patient's blood pressure, heart rate, level of consciousness, respirations and oxygen were monitored throughout the procedure. The scope was advanced to the second part of the duodenum. Retroflexion was performed in the fundus. The patient experienced no blood loss. The procedure was not difficult. The patient tolerated the procedure well. There were no apparent adverse events. ANESTHESIA INFORMATION: ASA: II Anesthesia Type: IV Sedation with Anesthesia MEDICATIONS: No administrations occurring from 1312 to 1331 on 03/01/24 FINDINGS: The esophagus appeared normal. 5 cm hiatal hernia - GE junction 33 cm from the incisors, diaphragmatic impression 38 cm from the incisors:  Hill classification: Grade IV Two polyps measuring smaller than 5 mm in the cardia; performed cold forceps biopsy Abnormal mucosa with erosion in the body of the stomach; performed cold forceps biopsy to rule out H. pylori Edematous mucosa with erosion in the duodenum; performed cold forceps biopsy SPECIMENS: ID  Type Source Tests Collected by Time Destination 1 : gastritis Tissue Stomach TISSUE EXAM Ning Vaughan DO 3/1/2024  1:22 PM  2 :  Tissue Stomach TISSUE EXAM Ning Vaughan DO 3/1/2024  1:29 PM      Impression: The esophagus appeared normal. 5 cm hiatal hernia 2 subcentimeter polyps in the cardia were removed with cold forceps biopsy Abnormal mucosa; performed cold forceps biopsies RECOMMENDATION:  Await pathology results Continue Protonix 40mg. If still persistent GERD symptoms, escalate to either stronger PPI or twice daily dosing Follow up in GI office   Ning Vaughan DO

## 2024-04-09 ENCOUNTER — OFFICE VISIT (OUTPATIENT)
Dept: DERMATOLOGY | Facility: CLINIC | Age: 69
End: 2024-04-09

## 2024-04-09 VITALS — HEIGHT: 65 IN | BODY MASS INDEX: 23.99 KG/M2 | WEIGHT: 144 LBS | TEMPERATURE: 97.1 F

## 2024-04-09 DIAGNOSIS — L98.9 SKIN LESION: ICD-10-CM

## 2024-04-09 DIAGNOSIS — D48.9 NEOPLASM OF UNCERTAIN BEHAVIOR: Primary | ICD-10-CM

## 2024-04-09 PROCEDURE — 88305 TISSUE EXAM BY PATHOLOGIST: CPT | Performed by: STUDENT IN AN ORGANIZED HEALTH CARE EDUCATION/TRAINING PROGRAM

## 2024-04-09 PROCEDURE — 88342 IMHCHEM/IMCYTCHM 1ST ANTB: CPT | Performed by: STUDENT IN AN ORGANIZED HEALTH CARE EDUCATION/TRAINING PROGRAM

## 2024-04-09 NOTE — PATIENT INSTRUCTIONS
"  INFORMED CONSENT DISCUSSION AND POST-OPERATIVE INSTRUCTIONS FOR PATIENT    I.  RATIONALE FOR PROCEDURE  I understand that a skin biopsy allows the Dermatologist to test a lesion or rash under the microscope to obtain a diagnosis.  It usually involves numbing the area with numbing medication and removing a small piece of skin; sometimes the area will be closed with sutures. In this specific procedure, sutures are not usually needed.  If any sutures are placed, then they are usually need to be removed in 2 weeks or less.    I understand that my Dermatologist recommends that a skin \"shave\" biopsy be performed today.  A local anesthetic, similar to the kind that a dentist uses when filling a cavity, will be injected with a very small needle into the skin area to be sampled.  The injected skin and tissue underneath \"will go to sleep” and become numb so no pain should be felt afterwards.  An instrument shaped like a tiny \"razor blade\" (shave biopsy instrument) will be used to cut a small piece of tissue and skin from the area so that a sample of tissue can be taken and examined more closely under the microscope.  A slight amount of bleeding will occur, but it will be stopped with direct pressure and a pressure bandage and any other appropriate methods.  I understands that a scar will form where the wound was created.  Surgical ointment will be applied to help protect the wound.  Sutures are not usually needed.    II.  RISKS AND POTENTIAL COMPLICATIONS   I understand the risks and potential complications of a skin biopsy include but are not limited to the following:  Bleeding  Infection  Pain  Scar/keloid  Skin discoloration  Incomplete Removal  Recurrence  Nerve Damage/Numbness/Loss of Function  Allergic Reaction to Anesthesia  Biopsies are diagnostic procedures and based on findings additional treatment or evaluation may be required  Loss or destruction of specimen resulting in no additional findings    My " "Dermatologist has explained to me the nature of the condition, the nature of the procedure, and the benefits to be reasonably expected compared with alternative approaches.  My Dermatologist has discussed the likelihood of major risks or complications of this procedure including the specific risks listed above, such as bleeding, infection, and scarring/keloid.  I understand that a scar is expected after this procedure.  I understand that my physician cannot predict if the scar will form a \"keloid,\" which extends beyond the borders of the wound that is created.  A keloid is a thick, painful, and bumpy scar.  A keloid can be difficult to treat, as it does not always respond well to therapy, which includes injecting cortisone directly into the keloid every few weeks.  While this usually reduces the pain and size of the scar, it does not eliminate it.      I understand that photographs may be taken before and after the procedure.  These will be maintained as part of the medical providers confidential records and may not be made available to me.  I further authorize the medical provider to use the photographs for teaching purposes or to illustrate scientific papers, books, or lectures if in his/her judgment, medical research, education, or science may benefit from its use.    I have had an opportunity to fully inquire about the risks and benefits of this procedure and its alternatives.   I have been given ample time and opportunity to ask questions and to seek a second opinion if I wished to do so.  I acknowledge that there have specifically been no guarantees as to the cosmetic results from the procedure.  I am aware that with any procedure there is always the possibility of an unexpected complication.    III. POST-PROCEDURAL CARE (WHAT YOU WILL NEED TO DO \"AFTER THE BIOPSY\" TO OPTIMIZE HEALING)    Keep the area clean and dry.  Try NOT to remove the bandage or get it wet for the first 24 hours.    Gently clean the area " Patient tolerated the procedure very well under propofol sedation. "and apply surgical ointment (such as Vaseline petrolatum ointment, which is available \"over the counter\" and not a prescription) to the biopsy site for up to 2 weeks straight.  This acts to protect the wound from the outside world.      Sutures are not usually placed in this procedure.  If any sutures were placed, return for suture removal as instructed (generally 1 week for the face, 2 weeks for the body).      Take Acetaminophen (Tylenol) for discomfort, if no contraindications.  Ibuprofen or aspirin could make bleeding worse.    Call our office immediately for signs of infection: fever, chills, increased redness, warmth, tenderness, discomfort/pain, or pus or foul smell coming from the wound.    WHAT TO DO IF THERE IS ANY BLEEDING?  If a small amount of bleeding is noticed, place a clean cloth over the area and apply firm pressure for ten minutes.  Check the wound after 10 minutes of direct pressure.  If bleeding persists, try one more time for an additional 10 minutes of direct pressure on the area.  If the bleeding becomes heavier or does not stop after the second attempt, or if you have any other questions about this procedure, then please call your [facility]'s Dermatologist by calling [phone] (SKIN).     I hereby acknowledge that I have reviewed and verified the site with my Dermatologist and have requested and authorized my Dermatologist to proceed with the procedure.  "

## 2024-04-09 NOTE — PROGRESS NOTES
"[facility]atology [facility] Note     Patient Name: Yahaira Galloway  Encounter Date: 04/09/24     Have you been cared for by a [facility] Dermatologist in the last 3 years and, if so, which description applies to you?    NO.   I am considered a \"new\" patient and must complete all patient intake questions. I am FEMALE/of child-bearing potential.    REVIEW OF SYSTEMS:  Have you recently had or currently have any of the following? Recent fever or chills? No  Any non-healing wound? No  Are you pregnant or planning to become pregnant? No  Are you currently or planning to be nursing or breast feeding? No   PAST MEDICAL HISTORY:  Have you personally ever had or currently have any of the following?  If \"YES,\" then please provide more detail. Skin cancer (such as Melanoma, Basal Cell Carcinoma, Squamous Cell Carcinoma?  No  Tuberculosis, HIV/AIDS, Hepatitis B or C: No  Radiation Treatment No   HISTORY OF IMMUNOSUPPRESSION:   Do you have a history of any of the following:  Systemic Immunosuppression such as Diabetes, Biologic or Immunotherapy, Chemotherapy, Organ Transplantation, Bone Marrow Transplantation?  No    Answering \"YES\" requires the addition of the dotphrase \"IMMUNOSUPPRESSED\" as the first diagnosis of the patient's visit.   FAMILY HISTORY:  Any \"first degree relatives\" (parent, brother, sister, or child) with the following?    Skin Cancer, Pancreatic or Other Cancer? YES, Mother skin cancer possible Melanoma, father Melanoma and kidney cancer, sister dkin cancer unknown    PATIENT EXPERIENCE:    Do you want the Dermatologist to perform a COMPLETE skin exam today including a clinical examination under the \"bra and underwear\" areas?  NO  If necessary, do we have your permission to call and leave a detailed message on your Preferred Phone number that includes your specific medical information?  Yes      Allergies   Allergen Reactions   • Cephalosporins Other (See Comments)     GI Upset (vomit/diarrhea)   • Medical Tape " Rash   • Metformin      Other reaction(s): Nausea and/or vomiting   • Nabumetone GI Intolerance   • Other GI Intolerance and Other (See Comments)   • Celecoxib    • Cephalexin    • Choline Magnesium Trisalicylate    • Diclofenac Other (See Comments)   • Diclofenac Potassium(Migraine) GI Intolerance   • Diclofenac Sodium GI Intolerance   • Fentanyl Other (See Comments)   • Propoxyphene Other (See Comments)     unknown   • Sulfa Antibiotics    • Sulfacetamide    • Zolpidem Other (See Comments)      Current Outpatient Medications:   •  amLODIPine (NORVASC) 5 mg tablet, Take 1 tablet (5 mg total) by mouth daily, Disp: 90 tablet, Rfl: 3  •  atorvastatin (LIPITOR) 40 mg tablet, Take 1 tablet (40 mg total) by mouth 4 (four) times a week, Disp: 90 tablet, Rfl: 3  •  Calcium Carbonate-Vitamin D (CALCIUM CREAMIES PO), Take 600 mg by mouth daily, Disp: , Rfl:   •  clonazePAM (KlonoPIN) 1 mg tablet, Take half a tablet twice a day as needed for anxiety, Disp: 30 tablet, Rfl: 5  •  dicyclomine (BENTYL) 20 mg tablet, TAKE 1 TABLET BY MOUTH 2 TIMES A DAY AS NEEDED (ABDOMINAL BLOATING/PAIN), Disp: 180 tablet, Rfl: 2  •  ferrous sulfate 325 (65 Fe) mg tablet, Per patient once/twice weekly, Disp: , Rfl:   •  fluocinonide (LIDEX) 0.05 % cream, APPLY TO AFFECTED AREA TWICE A DAY, Disp: 60 g, Rfl: 1  •  HYDROcodone-acetaminophen (Norco) 5-325 mg per tablet, Take 1 tablet by mouth 2 (two) times a day as needed (low back pain) Max Daily Amount: 2 tablets, Disp: 60 tablet, Rfl: 0  •  levothyroxine 125 mcg tablet, TAKE 1 TABLET BY MOUTH EVERY DAY, Disp: 90 tablet, Rfl: 1  •  Lidocaine (HM Lidocaine Patch) 4 % PTCH, Apply topically, Disp: , Rfl:   •  nebivolol (BYSTOLIC) 10 mg tablet, TAKE 1 TABLET BY MOUTH EVERY DAY, Disp: 90 tablet, Rfl: 3  •  omeprazole (PriLOSEC) 40 MG capsule, TAKE 1 CAPSULE (40 MG TOTAL) BY MOUTH DAILY., Disp: 90 capsule, Rfl: 1  •  sertraline (ZOLOFT) 100 mg tablet, TAKE 2 TABLETS BY MOUTH EVERY DAY, Disp: 180 tablet,  Rfl: 3  •  tiZANidine (ZANAFLEX) 4 mg tablet, Take 1 tablet (4 mg total) by mouth daily at bedtime as needed for muscle spasms, Disp: 90 tablet, Rfl: 1  •  Cholecalciferol 25 MCG (1000 UT) capsule, Take 2,000 Units by mouth (Patient not taking: Reported on 1/9/2024), Disp: , Rfl:   •  Ibuprofen (Advil) 200 MG CAPS, Take by mouth, Disp: , Rfl:   •  naloxone (NARCAN) 4 mg/0.1 mL nasal spray, Administer 1 spray into a nostril. If no response after 2-3 minutes, give another dose in the other nostril using a new spray. (Patient not taking: Reported on 1/9/2024), Disp: 1 each, Rfl: 1    Current Facility-Administered Medications:   •  cyanocobalamin injection 1,000 mcg, 1,000 mcg, Intramuscular, Q30 Days, Florence Barragan MD, 1,000 mcg at 02/21/18 1534  •  cyanocobalamin injection 1,000 mcg, 1,000 mcg, Intramuscular, Q30 Days, Florence Barragan MD, 1,000 mcg at 03/29/18 0717  •  cyanocobalamin injection 1,000 mcg, 1,000 mcg, Intramuscular, Q30 Days, Florence Barragan MD, 1,000 mcg at 04/30/18 1737  •  cyanocobalamin injection 1,000 mcg, 1,000 mcg, Intramuscular, Q30 Days, Jun Canela MD, 1,000 mcg at 08/10/18 1017  •  cyanocobalamin injection 1,000 mcg, 1,000 mcg, Intramuscular, Q30 Days, Florence Barragan MD, 1,000 mcg at 09/10/18 1127  •  cyanocobalamin injection 1,000 mcg, 1,000 mcg, Intramuscular, Q30 Days, Florence Barragan MD, 1,000 mcg at 02/11/19 0958  •  cyanocobalamin injection 1,000 mcg, 1,000 mcg, Intramuscular, Q30 Days, Florence Barragan MD, 1,000 mcg at 09/23/19 1739  •  cyanocobalamin injection 1,000 mcg, 1,000 mcg, Intramuscular, Q30 Days, Florence Barragan MD, 1,000 mcg at 10/29/19 2010  •  cyanocobalamin injection 1,000 mcg, 1,000 mcg, Intramuscular, Q30 Days, Nataliya Townsend MD, 1,000 mcg at 12/06/19 1513  •  cyanocobalamin injection 1,000 mcg, 1,000 mcg, Intramuscular, Q30 Days, Florence Barragan MD, 1,000 mcg at 07/02/20 1412  •  cyanocobalamin injection 1,000 mcg, 1,000 mcg,  "Intramuscular, Q30 Days, Florence Barragan MD, 1,000 mcg at 06/01/21 1100  •  cyanocobalamin injection 1,000 mcg, 1,000 mcg, Intramuscular, Q30 Days, Florence Barragan MD, 10,000 mcg at 07/10/23 1111          Whom besides the patient is providing clinical information about today's encounter?   NO ADDITIONAL HISTORIAN (patient alone provided history)    Physical Exam and Assessment/Plan by Diagnosis:    NEOPLASM OF UNCERTAIN BEHAVIOR OF SKIN    Physical Exam:  (Anatomic Location); (Size and Morphological Description); (Differential Diagnosis):  Left pelvic Specimen A; skin; 69 year old; female; shave; 1.2cm; eroded bruits nodule; irritated keratosis vs scc  Pertinent Positives:  Pertinent Negatives:    Additional History of Present Condition:  patient reports lesion on right pelvic for 2 years. Patient reports no bleeding or pain. Patient states over all health is good.     Assessment and Plan:  I have discussed with the patient that a sample of skin via a \"skin biopsy” would be potentially helpful to further make a specific diagnosis under the microscope.  Based on a thorough discussion of this condition and the management approach to it (including a comprehensive discussion of the known risks, side effects and potential benefits of treatment), the patient (family) agrees to implement the following specific plan:    Procedure:  Skin Biopsy.  After a thorough discussion of treatment options and risk/benefits/alternatives (including but not limited to local pain, scarring, dyspigmentation, blistering, possible superinfection, and inability to confirm a diagnosis via histopathology), verbal and written consent were obtained and portion of the rash was biopsied for tissue sample.  See below for consent that was obtained from patient and subsequent Procedure Note.     PROCEDURE TANGENTIAL (SHAVE) BIOPSY NOTE:    Performing Physician:   Anatomic Location; Clinical Description with size (cm); Pre-Op Diagnosis: " "  Specimen A; skin; 69 year old; female; shave; 1.2cm; eroded bruits nodule; irritated keratosis vs scc    Post-op diagnosis: Same     Local anesthesia: 3:1 1% xylocaine with epi and 1-100,000 buffered     Topical anesthesia: None    Hemostasis: Aluminum chloride       After obtaining informed consent  at which time there was a discussion about the purpose of biopsy  and low risks of infection and bleeding.  The area was prepped and draped in the usual fashion. Anesthesia was obtained with 1% lidocaine with epinephrine. A shave biopsy to an appropriate sampling depth was obtained by Shave (Dermablade or 15 blade) The resulting wound was covered with surgical ointment and bandaged appropriately.     The patient tolerated the procedure well without complications and was without signs of functional compromise.      Specimen has been sent for review by Dermatopathology.    Standard post-procedure care has been explained and has been included in written form within the patient's copy of Informed Consent.    INFORMED CONSENT DISCUSSION AND POST-OPERATIVE INSTRUCTIONS FOR PATIENT    I.  RATIONALE FOR PROCEDURE  I understand that a skin biopsy allows the Dermatologist to test a lesion or rash under the microscope to obtain a diagnosis.  It usually involves numbing the area with numbing medication and removing a small piece of skin; sometimes the area will be closed with sutures. In this specific procedure, sutures are not usually needed.  If any sutures are placed, then they are usually need to be removed in 2 weeks or less.    I understand that my Dermatologist recommends that a skin \"shave\" biopsy be performed today.  A local anesthetic, similar to the kind that a dentist uses when filling a cavity, will be injected with a very small needle into the skin area to be sampled.  The injected skin and tissue underneath \"will go to sleep” and become numb so no pain should be felt afterwards.  An instrument shaped like a tiny " "\"razor blade\" (shave biopsy instrument) will be used to cut a small piece of tissue and skin from the area so that a sample of tissue can be taken and examined more closely under the microscope.  A slight amount of bleeding will occur, but it will be stopped with direct pressure and a pressure bandage and any other appropriate methods.  I understands that a scar will form where the wound was created.  Surgical ointment will be applied to help protect the wound.  Sutures are not usually needed.    II.  RISKS AND POTENTIAL COMPLICATIONS   I understand the risks and potential complications of a skin biopsy include but are not limited to the following:  Bleeding  Infection  Pain  Scar/keloid  Skin discoloration  Incomplete Removal  Recurrence  Nerve Damage/Numbness/Loss of Function  Allergic Reaction to Anesthesia  Biopsies are diagnostic procedures and based on findings additional treatment or evaluation may be required  Loss or destruction of specimen resulting in no additional findings    My Dermatologist has explained to me the nature of the condition, the nature of the procedure, and the benefits to be reasonably expected compared with alternative approaches.  My Dermatologist has discussed the likelihood of major risks or complications of this procedure including the specific risks listed above, such as bleeding, infection, and scarring/keloid.  I understand that a scar is expected after this procedure.  I understand that my physician cannot predict if the scar will form a \"keloid,\" which extends beyond the borders of the wound that is created.  A keloid is a thick, painful, and bumpy scar.  A keloid can be difficult to treat, as it does not always respond well to therapy, which includes injecting cortisone directly into the keloid every few weeks.  While this usually reduces the pain and size of the scar, it does not eliminate it.      I understand that photographs may be taken before and after the procedure.  " "These will be maintained as part of the medical providers confidential records and may not be made available to me.  I further authorize the medical provider to use the photographs for teaching purposes or to illustrate scientific papers, books, or lectures if in his/her judgment, medical research, education, or science may benefit from its use.    I have had an opportunity to fully inquire about the risks and benefits of this procedure and its alternatives.   I have been given ample time and opportunity to ask questions and to seek a second opinion if I wished to do so.  I acknowledge that there have specifically been no guarantees as to the cosmetic results from the procedure.  I am aware that with any procedure there is always the possibility of an unexpected complication.    III. POST-PROCEDURAL CARE (WHAT YOU WILL NEED TO DO \"AFTER THE BIOPSY\" TO OPTIMIZE HEALING)    Keep the area clean and dry.  Try NOT to remove the bandage or get it wet for the first 24 hours.    Gently clean the area and apply surgical ointment (such as Vaseline petrolatum ointment, which is available \"over the counter\" and not a prescription) to the biopsy site for up to 2 weeks straight.  This acts to protect the wound from the outside world.      Sutures are not usually placed in this procedure.  If any sutures were placed, return for suture removal as instructed (generally 1 week for the face, 2 weeks for the body).      Take Acetaminophen (Tylenol) for discomfort, if no contraindications.  Ibuprofen or aspirin could make bleeding worse.    Call our office immediately for signs of infection: fever, chills, increased redness, warmth, tenderness, discomfort/pain, or pus or foul smell coming from the wound.    WHAT TO DO IF THERE IS ANY BLEEDING?  If a small amount of bleeding is noticed, place a clean cloth over the area and apply firm pressure for ten minutes.  Check the wound after 10 minutes of direct pressure.  If bleeding persists, " try one more time for an additional 10 minutes of direct pressure on the area.  If the bleeding becomes heavier or does not stop after the second attempt, or if you have any other questions about this procedure, then please call your [facility] Dermatologist by calling [phone] (SKIN).     I hereby acknowledge that I have reviewed and verified the site with my Dermatologist and have requested and authorized my Dermatologist to proceed with the procedure.    Scribe Attestation    I,:  Pilar Emery am acting as a scribe while in the presence of the attending physician.:       I,:  Lucio Noland MD personally performed the services described in this documentation    as scribed in my presence.:

## 2024-04-12 PROCEDURE — 88342 IMHCHEM/IMCYTCHM 1ST ANTB: CPT | Performed by: STUDENT IN AN ORGANIZED HEALTH CARE EDUCATION/TRAINING PROGRAM

## 2024-04-12 PROCEDURE — 88305 TISSUE EXAM BY PATHOLOGIST: CPT | Performed by: STUDENT IN AN ORGANIZED HEALTH CARE EDUCATION/TRAINING PROGRAM

## 2024-04-12 NOTE — RESULT ENCOUNTER NOTE
DERMATOPATHOLOGY RESULT NOTE    Results reviewed by ordering physician.  Called patient to personally discuss results. Discussed results with patient.       Instructions for Clinical Derm Team:   (remember to route Result Note to appropriate staff):    None    Result & Plan by Specimen:    Specimen A: benign  Plan: reassured, benign  Final Diagnosis  A. Skin, left pelvic, shave biopsy:    Consistent with VERRUCOUS KERATOSIS, irritated and inflamed (see note).    Note: If the lesion were to progress/persist, additional sampling should be sought.

## 2024-04-22 ENCOUNTER — CLINICAL SUPPORT (OUTPATIENT)
Dept: FAMILY MEDICINE CLINIC | Facility: CLINIC | Age: 69
End: 2024-04-22
Payer: MEDICARE

## 2024-04-22 DIAGNOSIS — E53.8 VITAMIN B 12 DEFICIENCY: Primary | ICD-10-CM

## 2024-04-22 PROCEDURE — 96372 THER/PROPH/DIAG INJ SC/IM: CPT | Performed by: FAMILY MEDICINE

## 2024-04-22 RX ORDER — CYANOCOBALAMIN 1000 UG/ML
1000 INJECTION, SOLUTION INTRAMUSCULAR; SUBCUTANEOUS ONCE
Status: COMPLETED | OUTPATIENT
Start: 2024-04-22 | End: 2024-04-22

## 2024-04-22 RX ADMIN — CYANOCOBALAMIN 1000 MCG: 1000 INJECTION, SOLUTION INTRAMUSCULAR; SUBCUTANEOUS at 14:12

## 2024-05-06 ENCOUNTER — PATIENT MESSAGE (OUTPATIENT)
Dept: GASTROENTEROLOGY | Facility: CLINIC | Age: 69
End: 2024-05-06

## 2024-05-10 DIAGNOSIS — M13.0 POLYARTICULAR ARTHRITIS: ICD-10-CM

## 2024-05-10 DIAGNOSIS — M51.9 DISC DISORDER OF LUMBAR REGION: ICD-10-CM

## 2024-05-10 RX ORDER — HYDROCODONE BITARTRATE AND ACETAMINOPHEN 5; 325 MG/1; MG/1
1 TABLET ORAL 2 TIMES DAILY PRN
Qty: 60 TABLET | Refills: 0 | Status: SHIPPED | OUTPATIENT
Start: 2024-05-10

## 2024-05-13 ENCOUNTER — CLINICAL SUPPORT (OUTPATIENT)
Dept: FAMILY MEDICINE CLINIC | Facility: CLINIC | Age: 69
End: 2024-05-13
Payer: MEDICARE

## 2024-05-13 DIAGNOSIS — E53.8 VITAMIN B 12 DEFICIENCY: Primary | ICD-10-CM

## 2024-05-13 PROCEDURE — 96372 THER/PROPH/DIAG INJ SC/IM: CPT | Performed by: FAMILY MEDICINE

## 2024-05-13 RX ORDER — CYANOCOBALAMIN 1000 UG/ML
1000 INJECTION, SOLUTION INTRAMUSCULAR; SUBCUTANEOUS ONCE
Status: COMPLETED | OUTPATIENT
Start: 2024-05-13 | End: 2024-05-13

## 2024-05-13 RX ADMIN — CYANOCOBALAMIN 1000 MCG: 1000 INJECTION, SOLUTION INTRAMUSCULAR; SUBCUTANEOUS at 14:01

## 2024-05-17 ENCOUNTER — ANESTHESIA (OUTPATIENT)
Dept: GASTROENTEROLOGY | Facility: HOSPITAL | Age: 69
End: 2024-05-17

## 2024-05-17 ENCOUNTER — HOSPITAL ENCOUNTER (OUTPATIENT)
Dept: GASTROENTEROLOGY | Facility: HOSPITAL | Age: 69
Setting detail: OUTPATIENT SURGERY
End: 2024-05-17
Payer: MEDICARE

## 2024-05-17 ENCOUNTER — ANESTHESIA EVENT (OUTPATIENT)
Dept: GASTROENTEROLOGY | Facility: HOSPITAL | Age: 69
End: 2024-05-17

## 2024-05-17 VITALS
SYSTOLIC BLOOD PRESSURE: 150 MMHG | HEART RATE: 68 BPM | OXYGEN SATURATION: 94 % | DIASTOLIC BLOOD PRESSURE: 77 MMHG | BODY MASS INDEX: 28.27 KG/M2 | HEIGHT: 60 IN | RESPIRATION RATE: 16 BRPM | TEMPERATURE: 98.9 F | WEIGHT: 144 LBS

## 2024-05-17 DIAGNOSIS — K29.40 ATROPHIC GASTRITIS WITHOUT HEMORRHAGE: ICD-10-CM

## 2024-05-17 DIAGNOSIS — K31.7 GASTRIC POLYP: ICD-10-CM

## 2024-05-17 PROCEDURE — 88342 IMHCHEM/IMCYTCHM 1ST ANTB: CPT | Performed by: PATHOLOGY

## 2024-05-17 PROCEDURE — 43251 EGD REMOVE LESION SNARE: CPT | Performed by: INTERNAL MEDICINE

## 2024-05-17 PROCEDURE — 88305 TISSUE EXAM BY PATHOLOGIST: CPT | Performed by: PATHOLOGY

## 2024-05-17 RX ORDER — PROPOFOL 10 MG/ML
INJECTION, EMULSION INTRAVENOUS AS NEEDED
Status: DISCONTINUED | OUTPATIENT
Start: 2024-05-17 | End: 2024-05-17

## 2024-05-17 RX ORDER — SODIUM CHLORIDE 9 MG/ML
INJECTION, SOLUTION INTRAVENOUS CONTINUOUS PRN
Status: DISCONTINUED | OUTPATIENT
Start: 2024-05-17 | End: 2024-05-17

## 2024-05-17 RX ORDER — PROPOFOL 10 MG/ML
INJECTION, EMULSION INTRAVENOUS CONTINUOUS PRN
Status: DISCONTINUED | OUTPATIENT
Start: 2024-05-17 | End: 2024-05-17

## 2024-05-17 RX ADMIN — PROPOFOL 100 MCG/KG/MIN: 10 INJECTION, EMULSION INTRAVENOUS at 14:24

## 2024-05-17 RX ADMIN — PROPOFOL 150 MG: 10 INJECTION, EMULSION INTRAVENOUS at 14:24

## 2024-05-17 RX ADMIN — SODIUM CHLORIDE: 0.9 INJECTION, SOLUTION INTRAVENOUS at 14:21

## 2024-05-17 NOTE — H&P
History and Physical -  [facility]  Yahaira Galloway 69 y.o. female MRN: [id number]                  HPI: Yahaira Galloway is a 69 y.o. year old female who presents for EGD      REVIEW OF SYSTEMS: Per the HPI, and otherwise unremarkable.    Historical Information   Past Medical History:   Diagnosis Date    Anemia     Iron and B-12 anemia    Anxiety and depression     Bleeding ulcer     Disease of thyroid gland     Eczema     GERD (gastroesophageal reflux disease)     Groin pain, right 02/03/2021    Scoliosis     Spinal meningioma (HCC)      Past Surgical History:   Procedure Laterality Date    SPINAL CORD STIMULATOR IMPLANT      SPINE SURGERY  1996     [facility]     Social History   Social History     Substance and Sexual Activity   Alcohol Use Yes    Comment: rare     Social History     Substance and Sexual Activity   Drug Use No     Social History     Tobacco Use   Smoking Status Never   Smokeless Tobacco Never     Family History   Problem Relation Age of Onset    Heart disease Mother     Skin cancer Mother         unsure of type    Esophageal cancer Father     Kidney cancer Father     Thyroid disease Father     Diabetes Son     Skin cancer Sister         unsure of type    Cervical cancer Maternal Grandmother     No Known Problems Maternal Grandfather     No Known Problems Paternal Grandmother     No Known Problems Paternal Grandfather     No Known Problems Sister     Skin cancer Maternal Aunt         unsure of type    No Known Problems Paternal Aunt     No Known Problems Paternal Aunt        Meds/Allergies       Current Outpatient Medications:     amLODIPine (NORVASC) 5 mg tablet    atorvastatin (LIPITOR) 40 mg tablet    Calcium Carbonate-Vitamin D (CALCIUM CREAMIES PO)    clonazePAM (KlonoPIN) 1 mg tablet    dicyclomine (BENTYL) 20 mg tablet    fluocinonide (LIDEX) 0.05 % cream    HYDROcodone-acetaminophen (Norco) 5-325 mg per tablet    levothyroxine 125 mcg tablet    Lidocaine (HM  Lidocaine Patch) 4 % PTCH    nebivolol (BYSTOLIC) 10 mg tablet    omeprazole (PriLOSEC) 40 MG capsule    sertraline (ZOLOFT) 100 mg tablet    tiZANidine (ZANAFLEX) 4 mg tablet    Cholecalciferol 25 MCG (1000 UT) capsule    ferrous sulfate 325 (65 Fe) mg tablet    Ibuprofen (Advil) 200 MG CAPS    naloxone (NARCAN) 4 mg/0.1 mL nasal spray    Current Facility-Administered Medications:     cyanocobalamin injection 1,000 mcg, 1,000 mcg, Intramuscular, Q30 Days, 1,000 mcg at 02/21/18 1534    cyanocobalamin injection 1,000 mcg, 1,000 mcg, Intramuscular, Q30 Days, 1,000 mcg at 03/29/18 0717    cyanocobalamin injection 1,000 mcg, 1,000 mcg, Intramuscular, Q30 Days, 1,000 mcg at 04/30/18 1737    cyanocobalamin injection 1,000 mcg, 1,000 mcg, Intramuscular, Q30 Days, 1,000 mcg at 08/10/18 1017    cyanocobalamin injection 1,000 mcg, 1,000 mcg, Intramuscular, Q30 Days, 1,000 mcg at 09/10/18 1127    cyanocobalamin injection 1,000 mcg, 1,000 mcg, Intramuscular, Q30 Days, 1,000 mcg at 02/11/19 0958    cyanocobalamin injection 1,000 mcg, 1,000 mcg, Intramuscular, Q30 Days, 1,000 mcg at 09/23/19 1739    cyanocobalamin injection 1,000 mcg, 1,000 mcg, Intramuscular, Q30 Days, 1,000 mcg at 10/29/19 2010    cyanocobalamin injection 1,000 mcg, 1,000 mcg, Intramuscular, Q30 Days, 1,000 mcg at 12/06/19 1513    cyanocobalamin injection 1,000 mcg, 1,000 mcg, Intramuscular, Q30 Days, 1,000 mcg at 07/02/20 1412    cyanocobalamin injection 1,000 mcg, 1,000 mcg, Intramuscular, Q30 Days, 1,000 mcg at 06/01/21 1100    cyanocobalamin injection 1,000 mcg, 1,000 mcg, Intramuscular, Q30 Days, 10,000 mcg at 07/10/23 1111    Allergies   Allergen Reactions    Cephalosporins Other (See Comments)     GI Upset (vomit/diarrhea)    Medical Tape Rash    Metformin      Other reaction(s): Nausea and/or vomiting    Nabumetone GI Intolerance    Other GI Intolerance and Other (See Comments)    Celecoxib     Cephalexin     Choline Magnesium Trisalicylate      Diclofenac Other (See Comments)    Diclofenac Potassium(Migraine) GI Intolerance    Diclofenac Sodium GI Intolerance    Fentanyl Other (See Comments)    Propoxyphene Other (See Comments)     unknown    Sulfa Antibiotics     Sulfacetamide     Zolpidem Other (See Comments)       Objective     /66   Pulse 65   Temp 98.9 °F (37.2 °C) (Temporal)   Resp 16   Ht 5' (1.524 m)   Wt 65.3 kg (144 lb)   SpO2 97%   BMI 28.12 kg/m²       PHYSICAL EXAM    Gen: NAD  Head: NCAT  CV: RRR  CHEST: Clear  ABD: soft, NT/ND  EXT: no edema      ASSESSMENT/PLAN:  This is a 69 y.o. year old female here for EGD, and she is stable and optimized for her procedure.

## 2024-05-17 NOTE — ANESTHESIA PREPROCEDURE EVALUATION
Procedure:  EGD    Relevant Problems   ANESTHESIA   (+) PONV (postoperative nausea and vomiting)      CARDIO   (+) Benign essential hypertension   (+) Mixed hyperlipidemia   (+) PSVT (paroxysmal supraventricular tachycardia)      ENDO   (+) Hypothyroidism      GI/HEPATIC   (+) Gastroesophageal reflux disease without esophagitis      HEMATOLOGY   (+) Iron deficiency anemia secondary to inadequate dietary iron intake      MUSCULOSKELETAL   (+) Impingement syndrome of left shoulder   (+) Sacroiliitis, not elsewhere classified (HCC)   (+) Scoliosis (and kyphoscoliosis), idiopathic      NEURO/PSYCH   (+) Continuous opioid dependence (HCC)   (+) Depression with anxiety      PULMONARY   (+) Obstructive sleep apnea syndrome        Physical Exam    Airway    Mallampati score: I  TM Distance: >3 FB  Neck ROM: full     Dental   No notable dental hx     Cardiovascular  Cardiovascular exam normal    Pulmonary  Pulmonary exam normal     Other Findings  post-pubertal.      Anesthesia Plan  ASA Score- 3     Anesthesia Type- IV sedation with anesthesia with ASA Monitors.         Additional Monitors:     Airway Plan:     Comment: I discussed risks (reviewed with patient on the anesthesia consent form), benefits and alternatives of monitored sedation including the possibility under sedation to have recall or mild discomfort.  .       Plan Factors-    Chart reviewed.    Patient summary reviewed.                  Induction- intravenous.    Postoperative Plan-         Informed Consent- Anesthetic plan and risks discussed with patient.  I personally reviewed this patient with the CRNA. Discussed and agreed on the Anesthesia Plan with the CRNA..

## 2024-05-22 PROCEDURE — 88305 TISSUE EXAM BY PATHOLOGIST: CPT | Performed by: PATHOLOGY

## 2024-05-22 PROCEDURE — 88342 IMHCHEM/IMCYTCHM 1ST ANTB: CPT | Performed by: PATHOLOGY

## 2024-06-27 ENCOUNTER — RA CDI HCC (OUTPATIENT)
Dept: OTHER | Facility: HOSPITAL | Age: 69
End: 2024-06-27

## 2024-06-28 ENCOUNTER — TELEPHONE (OUTPATIENT)
Age: 69
End: 2024-06-28

## 2024-06-28 NOTE — TELEPHONE ENCOUNTER
Yahaira is looking to schedule B12 shot. I do not see an order in the chart. She mentioned she is past due.

## 2024-07-01 ENCOUNTER — TELEPHONE (OUTPATIENT)
Dept: FAMILY MEDICINE CLINIC | Facility: CLINIC | Age: 69
End: 2024-07-01

## 2024-07-01 ENCOUNTER — CLINICAL SUPPORT (OUTPATIENT)
Dept: FAMILY MEDICINE CLINIC | Facility: CLINIC | Age: 69
End: 2024-07-01
Payer: MEDICARE

## 2024-07-01 DIAGNOSIS — M51.9 DISC DISORDER OF LUMBAR REGION: ICD-10-CM

## 2024-07-01 DIAGNOSIS — M13.0 POLYARTICULAR ARTHRITIS: ICD-10-CM

## 2024-07-01 DIAGNOSIS — F41.1 GAD (GENERALIZED ANXIETY DISORDER): ICD-10-CM

## 2024-07-01 DIAGNOSIS — E53.8 VITAMIN B 12 DEFICIENCY: Primary | ICD-10-CM

## 2024-07-01 PROCEDURE — 96372 THER/PROPH/DIAG INJ SC/IM: CPT | Performed by: FAMILY MEDICINE

## 2024-07-01 PROCEDURE — 99211 OFF/OP EST MAY X REQ PHY/QHP: CPT | Performed by: FAMILY MEDICINE

## 2024-07-01 RX ORDER — CYANOCOBALAMIN 1000 UG/ML
1000 INJECTION, SOLUTION INTRAMUSCULAR; SUBCUTANEOUS ONCE
Status: COMPLETED | OUTPATIENT
Start: 2024-07-01 | End: 2024-07-01

## 2024-07-01 RX ORDER — HYDROCODONE BITARTRATE AND ACETAMINOPHEN 5; 325 MG/1; MG/1
1 TABLET ORAL 2 TIMES DAILY PRN
Qty: 60 TABLET | Refills: 0 | Status: CANCELLED | OUTPATIENT
Start: 2024-07-01

## 2024-07-01 RX ADMIN — CYANOCOBALAMIN 1000 MCG: 1000 INJECTION, SOLUTION INTRAMUSCULAR; SUBCUTANEOUS at 15:25

## 2024-07-01 NOTE — TELEPHONE ENCOUNTER
Patient stated that she is having hip pain and she stated that you sometimes send a prescription of prednisone to her pharmacy?  Please call to advise

## 2024-07-02 NOTE — TELEPHONE ENCOUNTER
Please contact the patient and [facility] in [place]    I was attempting to refill her hydrocodone and Klonopin tonight and had to review [place] pain medicine website.    There is a prescription for alprazolam 2 mg tablets #60 issued on 5/14/2024 by Dr. Amber Linder.    Please clarify who is this physician (according to my research she is a Vet MD?    I will hold refills of hydrocodone and Klonopin will pending response.    Thank you

## 2024-07-02 NOTE — TELEPHONE ENCOUNTER
Spoke with [facility] pharmacy stated they only received scripts for hydrocodone and klonopin on 5/13/24 and 5/10/24, stated they didn't receive script for alprazolam 2mg tablets    Left message for patient to call back regarding script for alprazolam.

## 2024-07-02 NOTE — TELEPHONE ENCOUNTER
Please try to reach the patient.  I have never prescribed her alprazolam.  I usually prescribe her Klonopin and Vicodin.  I am trying to find out why alprazolam prescription has appeared on PDMP under her name with the Gamaliel Espinal as a prescriber?  Was she using it for her pets?  Please and thank you

## 2024-07-03 ENCOUNTER — TELEPHONE (OUTPATIENT)
Dept: FAMILY MEDICINE CLINIC | Facility: CLINIC | Age: 69
End: 2024-07-03

## 2024-07-03 RX ORDER — HYDROCODONE BITARTRATE AND ACETAMINOPHEN 5; 325 MG/1; MG/1
1 TABLET ORAL 2 TIMES DAILY PRN
Qty: 60 TABLET | Refills: 0 | Status: SHIPPED | OUTPATIENT
Start: 2024-07-03

## 2024-07-03 RX ORDER — CLONAZEPAM 1 MG/1
TABLET ORAL
Qty: 30 TABLET | Refills: 5 | Status: SHIPPED | OUTPATIENT
Start: 2024-07-03

## 2024-07-03 NOTE — TELEPHONE ENCOUNTER
PT return the call of Theodora. I call the office and transfer the call to the off for speak with Theodora. Thank you

## 2024-07-03 NOTE — TELEPHONE ENCOUNTER
Spoke to patient she stated her dogs prescription also goes to [facility] and DR Linder is her dogs vet and DR Linder prescribed those medications for her dogs not for her. She also asked for pain med because she fell and is in pain. Please advise

## 2024-07-03 NOTE — TELEPHONE ENCOUNTER
Please let patient know that I have refilled her pain medication and Klonopin.  Please remind her regarding office visit on Friday, July 5.  Thank you

## 2024-07-05 ENCOUNTER — OFFICE VISIT (OUTPATIENT)
Dept: FAMILY MEDICINE CLINIC | Facility: CLINIC | Age: 69
End: 2024-07-05
Payer: MEDICARE

## 2024-07-05 ENCOUNTER — APPOINTMENT (OUTPATIENT)
Dept: LAB | Facility: CLINIC | Age: 69
End: 2024-07-05
Payer: MEDICARE

## 2024-07-05 VITALS
SYSTOLIC BLOOD PRESSURE: 114 MMHG | BODY MASS INDEX: 27.29 KG/M2 | OXYGEN SATURATION: 96 % | DIASTOLIC BLOOD PRESSURE: 70 MMHG | TEMPERATURE: 97.1 F | WEIGHT: 139 LBS | HEART RATE: 63 BPM | HEIGHT: 60 IN | RESPIRATION RATE: 16 BRPM

## 2024-07-05 DIAGNOSIS — E78.2 MIXED HYPERLIPIDEMIA: ICD-10-CM

## 2024-07-05 DIAGNOSIS — Z12.31 SCREENING MAMMOGRAM FOR BREAST CANCER: ICD-10-CM

## 2024-07-05 DIAGNOSIS — I47.10 PSVT (PAROXYSMAL SUPRAVENTRICULAR TACHYCARDIA): ICD-10-CM

## 2024-07-05 DIAGNOSIS — L30.9 ECZEMA OF BOTH HANDS: ICD-10-CM

## 2024-07-05 DIAGNOSIS — I10 BENIGN ESSENTIAL HYPERTENSION: Primary | ICD-10-CM

## 2024-07-05 DIAGNOSIS — Z12.11 COLON CANCER SCREENING: ICD-10-CM

## 2024-07-05 DIAGNOSIS — M51.9 DISC DISORDER OF LUMBAR REGION: ICD-10-CM

## 2024-07-05 DIAGNOSIS — Z78.0 MENOPAUSE: ICD-10-CM

## 2024-07-05 DIAGNOSIS — F41.8 DEPRESSION WITH ANXIETY: Chronic | ICD-10-CM

## 2024-07-05 DIAGNOSIS — R92.1 BREAST CALCIFICATIONS ON MAMMOGRAM: ICD-10-CM

## 2024-07-05 LAB
ALBUMIN SERPL BCG-MCNC: 4.6 G/DL (ref 3.5–5)
ALP SERPL-CCNC: 104 U/L (ref 34–104)
ALT SERPL W P-5'-P-CCNC: 30 U/L (ref 7–52)
ANION GAP SERPL CALCULATED.3IONS-SCNC: 9 MMOL/L (ref 4–13)
AST SERPL W P-5'-P-CCNC: 33 U/L (ref 13–39)
BILIRUB SERPL-MCNC: 0.4 MG/DL (ref 0.2–1)
BUN SERPL-MCNC: 24 MG/DL (ref 5–25)
CALCIUM SERPL-MCNC: 9.8 MG/DL (ref 8.4–10.2)
CHLORIDE SERPL-SCNC: 101 MMOL/L (ref 96–108)
CHOLEST SERPL-MCNC: 207 MG/DL
CO2 SERPL-SCNC: 30 MMOL/L (ref 21–32)
CREAT SERPL-MCNC: 0.79 MG/DL (ref 0.6–1.3)
ERYTHROCYTE [DISTWIDTH] IN BLOOD BY AUTOMATED COUNT: 12.9 % (ref 11.6–15.1)
GFR SERPL CREATININE-BSD FRML MDRD: 76 ML/MIN/1.73SQ M
GLUCOSE P FAST SERPL-MCNC: 96 MG/DL (ref 65–99)
HCT VFR BLD AUTO: 41.6 % (ref 34.8–46.1)
HDLC SERPL-MCNC: 48 MG/DL
HGB BLD-MCNC: 14 G/DL (ref 11.5–15.4)
LDLC SERPL CALC-MCNC: 118 MG/DL (ref 0–100)
MCH RBC QN AUTO: 30.4 PG (ref 26.8–34.3)
MCHC RBC AUTO-ENTMCNC: 33.7 G/DL (ref 31.4–37.4)
MCV RBC AUTO: 90 FL (ref 82–98)
PLATELET # BLD AUTO: 195 THOUSANDS/UL (ref 149–390)
PMV BLD AUTO: 9.6 FL (ref 8.9–12.7)
POTASSIUM SERPL-SCNC: 4.3 MMOL/L (ref 3.5–5.3)
PROT SERPL-MCNC: 8.4 G/DL (ref 6.4–8.4)
RBC # BLD AUTO: 4.6 MILLION/UL (ref 3.81–5.12)
SODIUM SERPL-SCNC: 140 MMOL/L (ref 135–147)
TRIGL SERPL-MCNC: 203 MG/DL
TSH SERPL DL<=0.05 MIU/L-ACNC: 0.23 UIU/ML (ref 0.45–4.5)
WBC # BLD AUTO: 6.85 THOUSAND/UL (ref 4.31–10.16)

## 2024-07-05 PROCEDURE — 85027 COMPLETE CBC AUTOMATED: CPT

## 2024-07-05 PROCEDURE — 80053 COMPREHEN METABOLIC PANEL: CPT

## 2024-07-05 PROCEDURE — 80061 LIPID PANEL: CPT

## 2024-07-05 PROCEDURE — 99214 OFFICE O/P EST MOD 30 MIN: CPT | Performed by: FAMILY MEDICINE

## 2024-07-05 PROCEDURE — 84443 ASSAY THYROID STIM HORMONE: CPT

## 2024-07-05 PROCEDURE — 36415 COLL VENOUS BLD VENIPUNCTURE: CPT

## 2024-07-05 PROCEDURE — G2211 COMPLEX E/M VISIT ADD ON: HCPCS | Performed by: FAMILY MEDICINE

## 2024-07-05 RX ORDER — METHYLPREDNISOLONE 4 MG/1
TABLET ORAL
Qty: 21 EACH | Refills: 0 | Status: SHIPPED | OUTPATIENT
Start: 2024-07-05

## 2024-07-05 NOTE — PROGRESS NOTES
Ambulatory Visit  Name: Yahaira Galloway      : 1955      MRN: [id number]  Encounter Provider: Florence Barragan MD  Encounter Date: 2024   Encounter department: [facility]    Assessment & Plan   1. Benign essential hypertension  2. Disc disorder of lumbar region  -     methylPREDNISolone 4 MG tablet therapy pack; Use as directed on package  -     XR spine lumbar minimum 4 views non injury; Future; Expected date: 2024  3. PSVT (paroxysmal supraventricular tachycardia)  Assessment & Plan:  Continue beta-blocker/Bystolic 10 mg daily  4. Mixed hyperlipidemia  Assessment & Plan:  Atorvastatin 40 mg daily  Orders:  -     CBC; Future  -     Comprehensive metabolic panel; Future  -     Lipid Panel with Direct LDL reflex; Future  -     TSH, 3rd generation; Future  5. Colon cancer screening  -     Cologuard  6. Screening mammogram for breast cancer  -     Mammo diagnostic bilateral w 3d & cad; Future  7. Breast calcifications on mammogram  -     Mammo diagnostic bilateral w 3d & cad; Future  8. Menopause  -     DXA bone density spine hip and pelvis; Future; Expected date: 2024  9. Depression with anxiety  Assessment & Plan:  Continue Zoloft and Klonopin, symptoms are stably controlled     Return in about 6 months (around 2025) for follow up.    Patient is past due health screenings eluding mammogram, Cologuard and DEXA scan.  Blood work will be drawn today.  Start Medrol Dosepak due to worsening of chronic low back pain, known multilevel disc disease.  Proceed with x-ray of lumbar spine due to recent fall.    Return in about 6 months (around 2025) for follow up.      History of Present Illness     Follow-up chronic medical conditions.    Patient will be proceeding with blood work today.    Medications updated.    She had completed workup with [facility] due to recurrent symptoms of GERD and had EGD revealing:   EGD · The esophagus appeared normal.  · 5 cm hiatal hernia -  GE junction 33 cm from the incisors, diaphragmatic impression 38 cm from the incisors:  Hill classification: Grade IV  · Two polyps measuring smaller than 5 mm in the cardia; performed cold forceps biopsy  · Abnormal mucosa with erosion in the body of the stomach; performed cold forceps biopsy to rule out H. pylori  · Edematous mucosa with erosion in the duodenum; performed cold forceps biopsy    Patient declined colonoscopy but is agreeable to proceed with Cologuard.  She remains on omeprazole and stopped at ibuprofen.  She reports occasional wheezing only with cough or laugh.  Follwou p      Patient fell twice within the past week.  Both episodes occurred as she was walking backwards.  She hit her head a week ago with no loss of consciousness, had transient headache that has resolved.  No dizziness or visual changes.  Second time she fell backwards as well and hurt her lower back.  Complains of persistent low back pain.    PDMP review: Alprazolam appears on patient's profile.  It was prescribed by the vet for her rescue Lao Phillip who suffers from anxiety, recently adopted.      Review of Systems   Constitutional: Negative.    HENT: Negative.     Eyes: Negative.    Respiratory: Negative.     Cardiovascular: Negative.    Endocrine: Negative.    Genitourinary: Negative.    Musculoskeletal:  Positive for arthralgias and back pain.   Skin: Negative.    Allergic/Immunologic: Negative.    Neurological: Negative.    Psychiatric/Behavioral:  The patient is nervous/anxious.      Past Medical History:   Diagnosis Date   • Anemia     Iron and B-12 anemia   • Anxiety and depression    • Bleeding ulcer    • Disease of thyroid gland    • Eczema    • GERD (gastroesophageal reflux disease)    • Groin pain, right 02/03/2021   • Sacroiliitis, not elsewhere classified (HCC)    • Scoliosis    • Skin lesion 01/05/2024   • Spinal meningioma (HCC)      Past Surgical History:   Procedure Laterality Date   • SPINAL CORD STIMULATOR  IMPLANT     [facility]     Family History   Problem Relation Age of Onset   • Heart disease Mother    • Skin cancer Mother         unsure of type   • Esophageal cancer Father    • Kidney cancer Father    • Thyroid disease Father    • Diabetes Son    • Skin cancer Sister         unsure of type   • Cervical cancer Maternal Grandmother    • No Known Problems Maternal Grandfather    • No Known Problems Paternal Grandmother    • No Known Problems Paternal Grandfather    • No Known Problems Sister    • Skin cancer Maternal Aunt         unsure of type   • No Known Problems Paternal Aunt    • No Known Problems Paternal Aunt      Social History     Tobacco Use   • Smoking status: Never   • Smokeless tobacco: Never   Vaping Use   • Vaping status: Never Used   Substance and Sexual Activity   • Alcohol use: Yes     Comment: rare   • Drug use: No   • Sexual activity: Not Currently     Partners: Male     Current Outpatient Medications on File Prior to Visit   Medication Sig   • amLODIPine (NORVASC) 5 mg tablet Take 1 tablet (5 mg total) by mouth daily   • atorvastatin (LIPITOR) 40 mg tablet Take 1 tablet (40 mg total) by mouth 4 (four) times a week   • Calcium Carbonate-Vitamin D (CALCIUM CREAMIES PO) Take 600 mg by mouth daily   • Cholecalciferol 25 MCG (1000 UT) capsule Take 2,000 Units by mouth   • clonazePAM (KlonoPIN) 1 mg tablet TAKE HALF A TABLET TWICE A DAY AS NEEDED FOR ANXIETY   • dicyclomine (BENTYL) 20 mg tablet TAKE 1 TABLET BY MOUTH 2 TIMES A DAY AS NEEDED (ABDOMINAL BLOATING/PAIN)   • ferrous sulfate 325 (65 Fe) mg tablet Per patient once/twice weekly   • HYDROcodone-acetaminophen (Norco) 5-325 mg per tablet Take 1 tablet by mouth 2 (two) times a day as needed (low back pain) Max Daily Amount: 2 tablets   • levothyroxine 125 mcg tablet TAKE 1 TABLET BY MOUTH EVERY DAY   • Lidocaine (HM Lidocaine Patch) 4 % PTCH Apply topically   • nebivolol (BYSTOLIC) 10 mg tablet TAKE  1 TABLET BY MOUTH EVERY DAY   • omeprazole (PriLOSEC) 40 MG capsule TAKE 1 CAPSULE (40 MG TOTAL) BY MOUTH DAILY.   • sertraline (ZOLOFT) 100 mg tablet TAKE 2 TABLETS BY MOUTH EVERY DAY   • tiZANidine (ZANAFLEX) 4 mg tablet Take 1 tablet (4 mg total) by mouth daily at bedtime as needed for muscle spasms   • naloxone (NARCAN) 4 mg/0.1 mL nasal spray Administer 1 spray into a nostril. If no response after 2-3 minutes, give another dose in the other nostril using a new spray.     Allergies   Allergen Reactions   • Cephalosporins Other (See Comments)     GI Upset (vomit/diarrhea)   • Medical Tape Rash   • Metformin      Other reaction(s): Nausea and/or vomiting   • Nabumetone GI Intolerance   • Other GI Intolerance and Other (See Comments)   • Celecoxib    • Cephalexin    • Choline Magnesium Trisalicylate    • Diclofenac Other (See Comments)   • Diclofenac Potassium(Migraine) GI Intolerance   • Diclofenac Sodium GI Intolerance   • Fentanyl Other (See Comments)   • Propoxyphene Other (See Comments)     unknown   • Sulfa Antibiotics    • Sulfacetamide    • Zolpidem Other (See Comments)     Immunization History   Administered Date(s) Administered   • COVID-19 MODERNA VACC 0.5 ML IM 03/31/2021, 04/28/2021   • INFLUENZA 10/08/2007, 10/24/2008, 10/13/2009, 10/17/2011, 10/13/2012, 10/21/2013, 11/05/2014, 10/02/2015, 10/13/2016, 10/10/2017, 10/10/2017, 10/28/2022, 09/18/2023   • Influenza, high dose seasonal 0.7 mL 10/28/2022, 09/18/2023   • Influenza, recombinant, quadrivalent,injectable, preservative free 10/10/2018   • Pneumococcal Conjugate 13-Valent 05/09/2019   • Pneumococcal Conjugate Vaccine 20-valent (Pcv20), Polysace 11/15/2022   • Td (adult), Unspecified 08/12/1993   • Tdap 02/17/2008, 04/22/2019   • Zoster 04/29/2014     Objective     /70 (BP Location: Left arm, Patient Position: Sitting, Cuff Size: Standard)   Pulse 63   Temp (!) 97.1 °F (36.2 °C) (Temporal)   Resp 16   Ht 5' (1.524 m)   Wt 63 kg (139 lb)    SpO2 96%   BMI 27.15 kg/m²     Physical Exam  Vitals and nursing note reviewed.   Constitutional:       General: She is not in acute distress.     Appearance: Normal appearance. She is well-developed. She is not ill-appearing.   HENT:      Head: Normocephalic and atraumatic.   Eyes:      General: No scleral icterus.     Conjunctiva/sclera: Conjunctivae normal.   Neck:      Vascular: No carotid bruit.   Cardiovascular:      Rate and Rhythm: Normal rate and regular rhythm.      Heart sounds: Normal heart sounds. No murmur heard.  Pulmonary:      Effort: Pulmonary effort is normal. No respiratory distress.      Breath sounds: Normal breath sounds.   Abdominal:      General: Bowel sounds are normal. There is no abdominal bruit.      Palpations: Abdomen is soft.      Tenderness: There is no abdominal tenderness.   Musculoskeletal:      Cervical back: Neck supple. No rigidity.      Right lower leg: No edema.      Left lower leg: No edema.      Comments: No tenderness along lumbar spine, pain left lower back/paraspinal lumbar spasm on the left.   Skin:     General: Skin is warm.   Neurological:      General: No focal deficit present.      Mental Status: She is alert and oriented to person, place, and time.   Psychiatric:         Behavior: Behavior normal.       Administrative Statements

## 2024-07-07 PROBLEM — L98.9 SKIN LESION: Status: RESOLVED | Noted: 2024-01-05 | Resolved: 2024-07-07

## 2024-07-08 ENCOUNTER — TELEPHONE (OUTPATIENT)
Dept: FAMILY MEDICINE CLINIC | Facility: CLINIC | Age: 69
End: 2024-07-08

## 2024-07-08 DIAGNOSIS — E03.9 HYPOTHYROIDISM, UNSPECIFIED TYPE: ICD-10-CM

## 2024-07-08 RX ORDER — LEVOTHYROXINE SODIUM 112 UG/1
112 TABLET ORAL DAILY
Qty: 90 TABLET | Refills: 1 | Status: SHIPPED | OUTPATIENT
Start: 2024-07-08

## 2024-07-08 NOTE — TELEPHONE ENCOUNTER
Please contact the patient.  I reviewed results of blood work  Thyroid function is slightly overactive.  Please advise patient to change dose of levothyroxine from 125 to 112 mcg daily.  She should repeat blood work in 2 months to retest her thyroid function  Cholesterol is still slightly elevated.  Please find out if patient is taking atorvastatin every day and let me know  Thank you

## 2024-07-08 NOTE — TELEPHONE ENCOUNTER
Called and spoke with patient.     Patient is aware.   Per Patient, she cannot take atorvastatin when she takes her iron.   There is probably 2 days out of the week she does not take it.

## 2024-07-12 ENCOUNTER — TELEPHONE (OUTPATIENT)
Dept: MAMMOGRAPHY | Facility: CLINIC | Age: 69
End: 2024-07-12

## 2024-07-15 NOTE — TELEPHONE ENCOUNTER
Spoke with patient and provided information she said at this time she will leave it the way it is.  No other questions at this time

## 2024-07-15 NOTE — TELEPHONE ENCOUNTER
I have no objections for patient to combine atorvastatin and iron.  They should not interfere with each other.    If there is any other reason for patient not to take atorvastatin every day then I can increase her dose from 40 to 80 mg and she will take Rx 4 times per week.  Please let me know if her preference.    Thank you

## 2024-08-06 ENCOUNTER — CLINICAL SUPPORT (OUTPATIENT)
Dept: FAMILY MEDICINE CLINIC | Facility: CLINIC | Age: 69
End: 2024-08-06
Payer: MEDICARE

## 2024-08-06 DIAGNOSIS — E53.8 VITAMIN B 12 DEFICIENCY: Primary | Chronic | ICD-10-CM

## 2024-08-06 PROCEDURE — 96372 THER/PROPH/DIAG INJ SC/IM: CPT

## 2024-08-06 RX ORDER — CYANOCOBALAMIN 1000 UG/ML
1000 INJECTION, SOLUTION INTRAMUSCULAR; SUBCUTANEOUS ONCE
Status: COMPLETED | OUTPATIENT
Start: 2024-08-06 | End: 2024-08-06

## 2024-08-06 RX ADMIN — CYANOCOBALAMIN 1000 MCG: 1000 INJECTION, SOLUTION INTRAMUSCULAR; SUBCUTANEOUS at 12:41

## 2024-08-26 ENCOUNTER — TELEPHONE (OUTPATIENT)
Age: 69
End: 2024-08-26

## 2024-08-26 DIAGNOSIS — I10 BENIGN ESSENTIAL HYPERTENSION: Primary | ICD-10-CM

## 2024-08-26 DIAGNOSIS — E78.2 MIXED HYPERLIPIDEMIA: ICD-10-CM

## 2024-08-26 RX ORDER — ATORVASTATIN CALCIUM 40 MG/1
40 TABLET, FILM COATED ORAL
Qty: 100 TABLET | Refills: 3 | Status: SHIPPED | OUTPATIENT
Start: 2024-08-27 | End: 2024-08-29 | Stop reason: SDUPTHER

## 2024-08-26 RX ORDER — NEBIVOLOL 5 MG/1
10 TABLET ORAL DAILY
Qty: 60 TABLET | Refills: 1 | Status: SHIPPED | OUTPATIENT
Start: 2024-08-26

## 2024-08-26 NOTE — TELEPHONE ENCOUNTER
Please contact the patient.  Prescription for atorvastatin 40 mg every day was sent to the pharmacy  Since Bystolic 10 mg tablets are not available-I sent prescription for Bystolic 5 mg tablets with the directions to take 2 tablets (10 mg) once a day.    Thank you

## 2024-08-26 NOTE — TELEPHONE ENCOUNTER
"Pt called stating that nebivolol (BYSTOLIC) 10 mg tablet will not be avialable for a \"few weeks' per [facility]. Pharmacist asking if a different med can be prescribed. [facility]pharmacy #[id number]  [facility] - [address] [place] [address]  [address] [place], [place] [address]  Phone: [phone]  Fax: [phone]       Medication: atorvastatin (LIPITOR) 40 mg tablet     Dose/Frequency: Take 1 tablet (40 mg total) by mouth daily  *(Previous rx said 4x/week)    Quantity: 90 tablet     Pharmacy: [facility]/pharmacy #[id number] - [place] - [address] [address]  [address] [place] [address]  Phone: [phone]  Fax: [phone]     Office:   [x] PCP/Provider -   [] Speciality/Provider -     Does the patient have enough for 3 days?   [] Yes   [] No - Send as HP to POD    "

## 2024-08-29 DIAGNOSIS — E78.2 MIXED HYPERLIPIDEMIA: ICD-10-CM

## 2024-08-29 RX ORDER — ATORVASTATIN CALCIUM 40 MG/1
40 TABLET, FILM COATED ORAL DAILY
Qty: 100 TABLET | Refills: 3 | Status: SHIPPED | OUTPATIENT
Start: 2024-08-29

## 2024-08-29 NOTE — TELEPHONE ENCOUNTER
Prescription resent to pharmacy to state take 1 tablet daily however as per  instructions she should continue to take 1 tablet 4 times a week

## 2024-08-29 NOTE — TELEPHONE ENCOUNTER
"Patient called sating the pharmacy will not honor the prescription for Atorvastatin 40 mg because it needs to say  \"take one tablet every day\". Please resend and inform patient when done. Thank you.  "

## 2024-09-06 ENCOUNTER — CLINICAL SUPPORT (OUTPATIENT)
Dept: FAMILY MEDICINE CLINIC | Facility: CLINIC | Age: 69
End: 2024-09-06
Payer: MEDICARE

## 2024-09-06 DIAGNOSIS — E53.8 VITAMIN B 12 DEFICIENCY: Primary | Chronic | ICD-10-CM

## 2024-09-06 PROCEDURE — 96372 THER/PROPH/DIAG INJ SC/IM: CPT | Performed by: FAMILY MEDICINE

## 2024-09-06 RX ADMIN — CYANOCOBALAMIN 1000 MCG: 1000 INJECTION, SOLUTION INTRAMUSCULAR; SUBCUTANEOUS at 11:31

## 2024-09-12 DIAGNOSIS — M13.0 POLYARTICULAR ARTHRITIS: ICD-10-CM

## 2024-09-12 DIAGNOSIS — M51.9 DISC DISORDER OF LUMBAR REGION: ICD-10-CM

## 2024-09-12 RX ORDER — HYDROCODONE BITARTRATE AND ACETAMINOPHEN 5; 325 MG/1; MG/1
1 TABLET ORAL 2 TIMES DAILY PRN
Qty: 60 TABLET | Refills: 0 | Status: SHIPPED | OUTPATIENT
Start: 2024-09-12

## 2024-09-12 NOTE — TELEPHONE ENCOUNTER
Medication: HYDROcodone-acetaminophen (Norco) 5-325 mg per tablet     Dose/Frequency: Take 1 tablet by mouth 2 (two) times a day as needed (low back pain) Max Daily Amount: 2 tablets     Quantity:  60 tablet     Pharmacy: [facility]/pharmacy #[id number] - [place] - [address] [address]     Office:   [x] PCP/Provider - [place]   [] Speciality/Provider -     Does the patient have enough for 3 days?   [] Yes   [x] No - Send as HP to POD

## 2024-09-19 DIAGNOSIS — I10 BENIGN ESSENTIAL HYPERTENSION: ICD-10-CM

## 2024-09-19 RX ORDER — NEBIVOLOL 5 MG/1
10 TABLET ORAL DAILY
Qty: 180 TABLET | Refills: 1 | Status: SHIPPED | OUTPATIENT
Start: 2024-09-19

## 2024-09-22 DIAGNOSIS — M51.9 DISC DISORDER OF LUMBAR REGION: ICD-10-CM

## 2024-09-22 DIAGNOSIS — K21.9 GASTROESOPHAGEAL REFLUX DISEASE WITHOUT ESOPHAGITIS: ICD-10-CM

## 2024-09-24 RX ORDER — OMEPRAZOLE 40 MG/1
40 CAPSULE, DELAYED RELEASE ORAL DAILY
Qty: 30 CAPSULE | Refills: 5 | Status: SHIPPED | OUTPATIENT
Start: 2024-09-24

## 2024-10-08 ENCOUNTER — CLINICAL SUPPORT (OUTPATIENT)
Dept: FAMILY MEDICINE CLINIC | Facility: CLINIC | Age: 69
End: 2024-10-08
Payer: MEDICARE

## 2024-10-08 DIAGNOSIS — Z23 ENCOUNTER FOR IMMUNIZATION: Primary | ICD-10-CM

## 2024-10-08 PROCEDURE — G0008 ADMIN INFLUENZA VIRUS VAC: HCPCS

## 2024-10-08 PROCEDURE — 96372 THER/PROPH/DIAG INJ SC/IM: CPT

## 2024-10-08 PROCEDURE — 90662 IIV NO PRSV INCREASED AG IM: CPT

## 2024-10-08 RX ADMIN — CYANOCOBALAMIN 1000 MCG: 1000 INJECTION, SOLUTION INTRAMUSCULAR; SUBCUTANEOUS at 13:09

## 2024-11-08 ENCOUNTER — CLINICAL SUPPORT (OUTPATIENT)
Dept: FAMILY MEDICINE CLINIC | Facility: CLINIC | Age: 69
End: 2024-11-08
Payer: MEDICARE

## 2024-11-08 DIAGNOSIS — E53.8 VITAMIN B 12 DEFICIENCY: Primary | ICD-10-CM

## 2024-11-08 PROCEDURE — 96372 THER/PROPH/DIAG INJ SC/IM: CPT | Performed by: FAMILY MEDICINE

## 2024-11-08 RX ORDER — CYANOCOBALAMIN 1000 UG/ML
1000 INJECTION, SOLUTION INTRAMUSCULAR; SUBCUTANEOUS ONCE
Status: COMPLETED | OUTPATIENT
Start: 2024-11-08 | End: 2024-11-08

## 2024-11-08 RX ADMIN — CYANOCOBALAMIN 1000 MCG: 1000 INJECTION, SOLUTION INTRAMUSCULAR; SUBCUTANEOUS at 15:18

## 2024-11-20 DIAGNOSIS — I10 BENIGN ESSENTIAL HYPERTENSION: ICD-10-CM

## 2024-11-20 RX ORDER — AMLODIPINE BESYLATE 5 MG/1
5 TABLET ORAL DAILY
Qty: 90 TABLET | Refills: 1 | Status: SHIPPED | OUTPATIENT
Start: 2024-11-20

## 2024-11-21 DIAGNOSIS — K21.9 GASTROESOPHAGEAL REFLUX DISEASE WITHOUT ESOPHAGITIS: ICD-10-CM

## 2024-11-21 DIAGNOSIS — M13.0 POLYARTICULAR ARTHRITIS: ICD-10-CM

## 2024-11-21 DIAGNOSIS — M51.9 DISC DISORDER OF LUMBAR REGION: ICD-10-CM

## 2024-11-21 RX ORDER — OMEPRAZOLE 40 MG/1
40 CAPSULE, DELAYED RELEASE ORAL DAILY
Qty: 30 CAPSULE | Refills: 5 | Status: SHIPPED | OUTPATIENT
Start: 2024-11-21

## 2024-11-21 RX ORDER — HYDROCODONE BITARTRATE AND ACETAMINOPHEN 5; 325 MG/1; MG/1
1 TABLET ORAL 2 TIMES DAILY PRN
Qty: 60 TABLET | Refills: 0 | Status: SHIPPED | OUTPATIENT
Start: 2024-11-21

## 2024-11-21 NOTE — TELEPHONE ENCOUNTER
Medication: HYDROcodone-acetaminophen (Norco) 5-325 mg per tablet     Dose/Frequency:  Take 1 tablet by mouth 2 (two) times a day as needed (low back pain) Max Daily Amount: 2 tablets     Quantity: 60    Pharmacy: [facility]/pharmacy #[address] [place] [address]      Office:   [x] PCP/Provider - Florence Barragan MD   [] Speciality/Provider -     Does the patient have enough for 3 days?   [] Yes   [x] No - Send as HP to POD     Medication:     omeprazole (PriLOSEC) 40 MG capsule       Dose/Frequency: TAKE 1 CAPSULE (40 MG TOTAL) BY MOUTH DAILY     Quantity: 30    Pharmacy: [facility]/pharmacy #[address] [place] [address]    Office:   [x] PCP/Provider - Florence Barragan MD  [] Speciality/Provider -     Does the patient have enough for 3 days?   [] Yes   [x] No - Send as HP to POD

## 2024-11-22 ENCOUNTER — TELEPHONE (OUTPATIENT)
Age: 69
End: 2024-11-22

## 2024-12-12 ENCOUNTER — CLINICAL SUPPORT (OUTPATIENT)
Dept: FAMILY MEDICINE CLINIC | Facility: CLINIC | Age: 69
End: 2024-12-12
Payer: MEDICARE

## 2024-12-12 DIAGNOSIS — E53.8 VITAMIN B 12 DEFICIENCY: Primary | ICD-10-CM

## 2024-12-12 PROCEDURE — 96372 THER/PROPH/DIAG INJ SC/IM: CPT | Performed by: FAMILY MEDICINE

## 2024-12-12 RX ORDER — CYANOCOBALAMIN 1000 UG/ML
1000 INJECTION, SOLUTION INTRAMUSCULAR; SUBCUTANEOUS ONCE
Status: COMPLETED | OUTPATIENT
Start: 2024-12-12 | End: 2024-12-12

## 2024-12-12 RX ADMIN — CYANOCOBALAMIN 1000 MCG: 1000 INJECTION, SOLUTION INTRAMUSCULAR; SUBCUTANEOUS at 16:34

## 2024-12-14 DIAGNOSIS — K21.9 GASTROESOPHAGEAL REFLUX DISEASE WITHOUT ESOPHAGITIS: ICD-10-CM

## 2024-12-16 RX ORDER — OMEPRAZOLE 40 MG/1
40 CAPSULE, DELAYED RELEASE ORAL DAILY
Qty: 90 CAPSULE | Refills: 1 | Status: SHIPPED | OUTPATIENT
Start: 2024-12-16

## 2024-12-31 DIAGNOSIS — E03.9 HYPOTHYROIDISM, UNSPECIFIED TYPE: ICD-10-CM

## 2024-12-31 RX ORDER — LEVOTHYROXINE SODIUM 112 UG/1
112 TABLET ORAL DAILY
Qty: 90 TABLET | Refills: 1 | Status: SHIPPED | OUTPATIENT
Start: 2024-12-31

## 2025-01-14 ENCOUNTER — CLINICAL SUPPORT (OUTPATIENT)
Dept: FAMILY MEDICINE CLINIC | Facility: CLINIC | Age: 70
End: 2025-01-14
Payer: MEDICARE

## 2025-01-14 DIAGNOSIS — E53.8 VITAMIN B 12 DEFICIENCY: Primary | Chronic | ICD-10-CM

## 2025-01-14 PROCEDURE — 96372 THER/PROPH/DIAG INJ SC/IM: CPT | Performed by: FAMILY MEDICINE

## 2025-01-17 ENCOUNTER — TELEPHONE (OUTPATIENT)
Age: 70
End: 2025-01-17

## 2025-01-17 DIAGNOSIS — F41.1 GAD (GENERALIZED ANXIETY DISORDER): ICD-10-CM

## 2025-01-17 NOTE — TELEPHONE ENCOUNTER
I have not heard about any levothyroxine recall.  It might have been specific recall for certain strengths of levothyroxine and certain generic manufacturers.  Patient should contact her pharmacist directly.  Thank you

## 2025-01-17 NOTE — TELEPHONE ENCOUNTER
Patient would like to know if there is any information regarding a recall on Levothyroxine 112 mcg? She saw something in [employer] and would like to know if Florence Barragan MD has any information on this.     Please advise     CB# [phone]

## 2025-01-17 NOTE — TELEPHONE ENCOUNTER
Medication: clonazepam     Dose/Frequency: 1 mg     Quantity: 30    Pharmacy: [facility]     Office:   [x] PCP/Provider -   [] Speciality/Provider -     Does the patient have enough for 3 days?   [x] Yes   [] No - Send as HP to POD

## 2025-01-19 RX ORDER — CLONAZEPAM 1 MG/1
TABLET ORAL
Qty: 30 TABLET | Refills: 5 | Status: SHIPPED | OUTPATIENT
Start: 2025-01-19

## 2025-01-22 DIAGNOSIS — K21.9 GASTROESOPHAGEAL REFLUX DISEASE WITHOUT ESOPHAGITIS: ICD-10-CM

## 2025-01-22 RX ORDER — OMEPRAZOLE 40 MG/1
40 CAPSULE, DELAYED RELEASE ORAL DAILY
Qty: 90 CAPSULE | Refills: 2 | OUTPATIENT
Start: 2025-01-22

## 2025-01-27 NOTE — TELEPHONE ENCOUNTER
Requested Prescriptions     Pending Prescriptions Disp Refills    metaxalone (SKELAXIN) 800 mg tablet [Pharmacy Med Name: METAXALONE 800 MG TABLET] 60 tablet 4     Sig: TAKE 1 TABLET BY MOUTH TWICE A DAY AS NEEDED FOR MUSCLE SPASMS       Please review and advise
97

## 2025-01-30 ENCOUNTER — RA CDI HCC (OUTPATIENT)
Dept: OTHER | Facility: HOSPITAL | Age: 70
End: 2025-01-30

## 2025-02-05 ENCOUNTER — APPOINTMENT (OUTPATIENT)
Dept: LAB | Facility: CLINIC | Age: 70
End: 2025-02-05
Payer: MEDICARE

## 2025-02-05 ENCOUNTER — OFFICE VISIT (OUTPATIENT)
Dept: FAMILY MEDICINE CLINIC | Facility: CLINIC | Age: 70
End: 2025-02-05
Payer: MEDICARE

## 2025-02-05 VITALS
TEMPERATURE: 98.2 F | RESPIRATION RATE: 16 BRPM | OXYGEN SATURATION: 99 % | WEIGHT: 140 LBS | HEART RATE: 62 BPM | SYSTOLIC BLOOD PRESSURE: 126 MMHG | DIASTOLIC BLOOD PRESSURE: 60 MMHG | HEIGHT: 60 IN | BODY MASS INDEX: 27.48 KG/M2

## 2025-02-05 DIAGNOSIS — E03.9 HYPOTHYROIDISM, UNSPECIFIED TYPE: ICD-10-CM

## 2025-02-05 DIAGNOSIS — E55.9 VITAMIN D DEFICIENCY: ICD-10-CM

## 2025-02-05 DIAGNOSIS — K21.9 GASTROESOPHAGEAL REFLUX DISEASE WITHOUT ESOPHAGITIS: ICD-10-CM

## 2025-02-05 DIAGNOSIS — I47.10 PSVT (PAROXYSMAL SUPRAVENTRICULAR TACHYCARDIA) (HCC): ICD-10-CM

## 2025-02-05 DIAGNOSIS — R15.9 INCONTINENCE OF FECES, UNSPECIFIED FECAL INCONTINENCE TYPE: ICD-10-CM

## 2025-02-05 DIAGNOSIS — M13.0 POLYARTICULAR ARTHRITIS: ICD-10-CM

## 2025-02-05 DIAGNOSIS — Z00.00 MEDICARE ANNUAL WELLNESS VISIT, SUBSEQUENT: Primary | ICD-10-CM

## 2025-02-05 DIAGNOSIS — E53.8 VITAMIN B 12 DEFICIENCY: Chronic | ICD-10-CM

## 2025-02-05 DIAGNOSIS — F11.20 CONTINUOUS OPIOID DEPENDENCE (HCC): ICD-10-CM

## 2025-02-05 DIAGNOSIS — E78.2 MIXED HYPERLIPIDEMIA: ICD-10-CM

## 2025-02-05 DIAGNOSIS — F41.8 DEPRESSION WITH ANXIETY: Chronic | ICD-10-CM

## 2025-02-05 DIAGNOSIS — M51.9 DISC DISORDER OF LUMBAR REGION: ICD-10-CM

## 2025-02-05 LAB
25(OH)D3 SERPL-MCNC: 33.5 NG/ML (ref 30–100)
BASOPHILS # BLD AUTO: 0.03 THOUSANDS/ΜL (ref 0–0.1)
BASOPHILS NFR BLD AUTO: 0 % (ref 0–1)
EOSINOPHIL # BLD AUTO: 0.17 THOUSAND/ΜL (ref 0–0.61)
EOSINOPHIL NFR BLD AUTO: 3 % (ref 0–6)
ERYTHROCYTE [DISTWIDTH] IN BLOOD BY AUTOMATED COUNT: 13.2 % (ref 11.6–15.1)
HCT VFR BLD AUTO: 40.4 % (ref 34.8–46.1)
HGB BLD-MCNC: 13.3 G/DL (ref 11.5–15.4)
IMM GRANULOCYTES # BLD AUTO: 0.02 THOUSAND/UL (ref 0–0.2)
IMM GRANULOCYTES NFR BLD AUTO: 0 % (ref 0–2)
LYMPHOCYTES # BLD AUTO: 1.47 THOUSANDS/ΜL (ref 0.6–4.47)
LYMPHOCYTES NFR BLD AUTO: 22 % (ref 14–44)
MCH RBC QN AUTO: 30.8 PG (ref 26.8–34.3)
MCHC RBC AUTO-ENTMCNC: 32.9 G/DL (ref 31.4–37.4)
MCV RBC AUTO: 94 FL (ref 82–98)
MONOCYTES # BLD AUTO: 0.35 THOUSAND/ΜL (ref 0.17–1.22)
MONOCYTES NFR BLD AUTO: 5 % (ref 4–12)
NEUTROPHILS # BLD AUTO: 4.68 THOUSANDS/ΜL (ref 1.85–7.62)
NEUTS SEG NFR BLD AUTO: 70 % (ref 43–75)
NRBC BLD AUTO-RTO: 0 /100 WBCS
PLATELET # BLD AUTO: 188 THOUSANDS/UL (ref 149–390)
PMV BLD AUTO: 9.3 FL (ref 8.9–12.7)
RBC # BLD AUTO: 4.32 MILLION/UL (ref 3.81–5.12)
T4 FREE SERPL-MCNC: 1 NG/DL (ref 0.61–1.12)
TSH SERPL DL<=0.05 MIU/L-ACNC: 2.42 UIU/ML (ref 0.45–4.5)
VIT B12 SERPL-MCNC: 373 PG/ML (ref 180–914)
WBC # BLD AUTO: 6.72 THOUSAND/UL (ref 4.31–10.16)

## 2025-02-05 PROCEDURE — 85025 COMPLETE CBC W/AUTO DIFF WBC: CPT

## 2025-02-05 PROCEDURE — 84443 ASSAY THYROID STIM HORMONE: CPT

## 2025-02-05 PROCEDURE — 99214 OFFICE O/P EST MOD 30 MIN: CPT | Performed by: FAMILY MEDICINE

## 2025-02-05 PROCEDURE — 36415 COLL VENOUS BLD VENIPUNCTURE: CPT

## 2025-02-05 PROCEDURE — 84439 ASSAY OF FREE THYROXINE: CPT

## 2025-02-05 PROCEDURE — 96372 THER/PROPH/DIAG INJ SC/IM: CPT

## 2025-02-05 PROCEDURE — 80053 COMPREHEN METABOLIC PANEL: CPT

## 2025-02-05 PROCEDURE — 80061 LIPID PANEL: CPT

## 2025-02-05 PROCEDURE — 82306 VITAMIN D 25 HYDROXY: CPT

## 2025-02-05 PROCEDURE — 82607 VITAMIN B-12: CPT

## 2025-02-05 PROCEDURE — G0439 PPPS, SUBSEQ VISIT: HCPCS | Performed by: FAMILY MEDICINE

## 2025-02-05 RX ORDER — HYDROCODONE BITARTRATE AND ACETAMINOPHEN 5; 325 MG/1; MG/1
1 TABLET ORAL 2 TIMES DAILY PRN
Qty: 60 TABLET | Refills: 0 | Status: SHIPPED | OUTPATIENT
Start: 2025-02-05

## 2025-02-05 RX ORDER — OMEPRAZOLE 40 MG/1
40 CAPSULE, DELAYED RELEASE ORAL DAILY
Qty: 90 CAPSULE | Refills: 3 | Status: SHIPPED | OUTPATIENT
Start: 2025-02-05

## 2025-02-05 RX ORDER — KETOROLAC TROMETHAMINE 30 MG/ML
30 INJECTION, SOLUTION INTRAMUSCULAR; INTRAVENOUS ONCE
Status: COMPLETED | OUTPATIENT
Start: 2025-02-05 | End: 2025-02-05

## 2025-02-05 RX ADMIN — KETOROLAC TROMETHAMINE 30 MG: 30 INJECTION, SOLUTION INTRAMUSCULAR; INTRAVENOUS at 15:03

## 2025-02-05 NOTE — PATIENT INSTRUCTIONS
Please schedule mammogram, [phone], orders are valid till July  Please proceed with Cologuard  Bone density scan is ordered   Pelvic PT.  Additional PT orders can be placed

## 2025-02-05 NOTE — ASSESSMENT & PLAN NOTE
Worsening of chronic low back pain and arthralgias.  Injection of Toradol administered today.  Orders:  •  HYDROcodone-acetaminophen (Norco) 5-325 mg per tablet; Take 1 tablet by mouth 2 (two) times a day as needed (low back pain) Max Daily Amount: 2 tablets  •  ketorolac (TORADOL) injection 30 mg

## 2025-02-05 NOTE — ASSESSMENT & PLAN NOTE
Chronic polyarticular severe osteoarthritis  Orders:  •  HYDROcodone-acetaminophen (Norco) 5-325 mg per tablet; Take 1 tablet by mouth 2 (two) times a day as needed (low back pain) Max Daily Amount: 2 tablets

## 2025-02-05 NOTE — PROGRESS NOTES
Name: Yahaira Galloway      : 1955      MRN: [id number]  Encounter Provider: Florence Barragan MD  Encounter Date: 2025   Encounter department: [facility]    Assessment & Plan  Medicare annual wellness visit, subsequent         Vitamin B 12 deficiency  Vitamin B12 injections every 4 weeks  Orders:  •  Vitamin B12; Future    Hypothyroidism, unspecified type  Continue levothyroxine, pending blood work       Mixed hyperlipidemia  Atorvastatin 40 mg daily.  Orders:  •  CBC and differential; Future  •  Comprehensive metabolic panel; Future  •  Lipid Panel with Direct LDL reflex; Future    Vitamin D deficiency    Orders:  •  Vitamin D 25 hydroxy; Future    Continuous opioid dependence (HCC)  Patient uses Vicodin as needed for chronic severe arthritic, neck and back pain.  No history of misuse       PSVT (paroxysmal supraventricular tachycardia) (HCC)  Patient remains under care of cardiology at [facility].  Continue nebivolol        Gastroesophageal reflux disease without esophagitis   EGD in 2024 in May 2024.  2 gastric polyps were removed.  She was diagnosed with atrophic gastritis.  Follow-up with GI PRN, advised to continue B12 injections  Orders:  •  omeprazole (PriLOSEC) 40 MG capsule; Take 1 capsule (40 mg total) by mouth daily    Polyarticular arthritis  Chronic polyarticular severe osteoarthritis  Orders:  •  HYDROcodone-acetaminophen (Norco) 5-325 mg per tablet; Take 1 tablet by mouth 2 (two) times a day as needed (low back pain) Max Daily Amount: 2 tablets    Disc disorder of lumbar region  Worsening of chronic low back pain and arthralgias.  Injection of Toradol administered today.  Orders:  •  HYDROcodone-acetaminophen (Norco) 5-325 mg per tablet; Take 1 tablet by mouth 2 (two) times a day as needed (low back pain) Max Daily Amount: 2 tablets  •  ketorolac (TORADOL) injection 30 mg    Incontinence of feces, unspecified fecal incontinence type  Referral to pelvic PT  Orders:  •   Ambulatory referral to Physical Therapy; Future    Depression with anxiety  Symptoms are well-controlled, continue Zoloft and Klonopin          Preventive health issues were discussed with patient, and age appropriate screening tests were ordered as noted in patient's After Visit Summary. Personalized health advice and appropriate referrals for health education or preventive services given if needed, as noted in patient's After Visit Summary.    Patient Instructions   Please schedule mammogram, [phone], orders are valid till July  Please proceed with Cologuard  Bone density scan is ordered   Pelvic PT.  Additional PT orders can be placed    Return in about 6 months (around 8/5/2025) for follow up.    History of Present Illness     AWV  and f/up      Injections of vitamin b12 every 4 weeks    She takes 2 calcium with vitamin D when she remembers    Iron tabs few times a week    Started on magnesium and takes a few times a week    Had 2 EGDs in March 2024 in May 2024.  2 gastric polyps were removed.  She was diagnosed with atrophic gastritis.  Follow-up with GI PRN, advised to continue B12 injections      Nebivolol 10 mg daily as per cardiology, follows with cardiology at [facility]/heart care group, denies symptoms of palpitations     Patient reports worsening of arthralgias and low back pain after recent trip to [facility]     Patient uses hydrocodone for chronic low back in particular arthritic pain as directed.  She was evaluated by [facility] spine [facility] pain center back in 2022, Dr. Snow.  GI intolerance to NSAIDs.    Past due mammogram, DEXA scan and colorectal screening.  Patient has Cologuard kit on hand hand orders for DEXA scan/mammogram in the system.           Patient Care Team:  Florence Barragan MD as PCP - General (Family Medicine)    Review of Systems   Constitutional:  Positive for fatigue.   HENT: Negative.     Eyes: Negative.    Respiratory: Negative.     Cardiovascular: Negative.     Gastrointestinal: Negative.         Fecal soiling/incontinence   Endocrine: Negative.    Genitourinary: Negative.    Musculoskeletal:  Positive for arthralgias and back pain.   Skin: Negative.    Allergic/Immunologic: Negative.    Neurological: Negative.    Psychiatric/Behavioral: Negative.       Medical History Reviewed by provider this encounter:  Tobacco  Allergies  Meds  Problems  Med Hx  Surg Hx  Fam Hx       Annual Wellness Visit Questionnaire   Yahaira is here for her Subsequent Wellness visit. Last Medicare Wellness visit information reviewed, patient interviewed and updates made to the record today.      Health Risk Assessment:   Patient rates overall health as very good. Patient feels that their physical health rating is same. Patient is satisfied with their life. Eyesight was rated as same. Hearing was rated as same. Patient feels that their emotional and mental health rating is same. Patients states they are never, rarely angry. Patient states they are never, rarely unusually tired/fatigued. Pain experienced in the last 7 days has been a lot. Patient's pain rating has been 10/10. Patient states that she has experienced no weight loss or gain in last 6 months.     Depression Screening:   PHQ-9 Score: 0      Fall Risk Screening:   In the past year, patient has experienced: history of falling in past year    Number of falls: 2 or more  Injured during fall?: No    Feels unsteady when standing or walking?: Yes    Worried about falling?: Yes      Urinary Incontinence Screening:   Patient has leaked urine accidently in the last six months.     Home Safety:  Patient does not have trouble with stairs inside or outside of their home. Patient has working smoke alarms and has working carbon monoxide detector. Home safety hazards include: none.     Nutrition:   Current diet is Regular.     Medications:   Patient is currently taking over-the-counter supplements. OTC medications include: see medication list.  Patient is able to manage medications.     Activities of Daily Living (ADLs)/Instrumental Activities of Daily Living (IADLs):   Walk and transfer into and out of bed and chair?: Yes  Dress and groom yourself?: Yes    Bathe or shower yourself?: Yes    Feed yourself? Yes  Do your laundry/housekeeping?: Yes  Manage your money, pay your bills and track your expenses?: Yes  Make your own meals?: Yes    Do your own shopping?: Yes    Previous Hospitalizations:   Any hospitalizations or ED visits within the last 12 months?: No      Advance Care Planning:   Living will: No    Durable POA for healthcare: No    Advanced directive: No      PREVENTIVE SCREENINGS      Cardiovascular Screening:    General: Screening Not Indicated and History Lipid Disorder      Diabetes Screening:     General: Screening Current      Cervical Cancer Screening:    General: Screening Not Indicated      Lung Cancer Screening:     General: Screening Not Indicated      Hepatitis C Screening:    General: Screening Current    Screening, Brief Intervention, and Referral to Treatment (SBIRT)    Screening    Typical number of drinks in a week: 0    Single Item Drug Screening:  How often have you used an illegal drug (including marijuana) or a prescription medication for non-medical reasons in the past year? never    Single Item Drug Screen Score: 0  Interpretation: Negative screen for possible drug use disorder    Review of Current Opioid Use  Opioid Risk Tool (ORT) Score: 0  Opioid Risk Tool (ORT) Interpretation: Score 0-3: Low risk for opioid misuse    Social Drivers of Health     Financial Resource Strain: Low Risk  (1/5/2024)    Overall Financial Resource Strain (CARDIA)    • Difficulty of Paying Living Expenses: Not hard at all   Food Insecurity: No Food Insecurity (2/5/2025)    Hunger Vital Sign    • Worried About Running Out of Food in the Last Year: Never true    • Ran Out of Food in the Last Year: Never true   Transportation Needs: No Transportation  Needs (2/5/2025)    PRAPARE - Transportation    • Lack of Transportation (Medical): No    • Lack of Transportation (Non-Medical): No   Housing Stability: Low Risk  (2/5/2025)    Housing Stability Vital Sign    • Unable to Pay for Housing in the Last Year: No    • Number of Times Moved in the Last Year: 0    • Homeless in the Last Year: No   Utilities: Not At Risk (2/5/2025)    [facility] Utilities    • Threatened with loss of utilities: No     No results found.    Objective   /60 (BP Location: Left arm, Patient Position: Sitting, Cuff Size: Standard)   Pulse 62   Temp 98.2 °F (36.8 °C) (Temporal)   Resp 16   Ht 5' (1.524 m)   Wt 63.5 kg (140 lb)   SpO2 99%   BMI 27.34 kg/m²     Physical Exam  Vitals and nursing note reviewed.   Constitutional:       General: She is not in acute distress.     Appearance: Normal appearance. She is well-developed. She is not ill-appearing.   HENT:      Head: Normocephalic and atraumatic.   Eyes:      General: No scleral icterus.     Conjunctiva/sclera: Conjunctivae normal.   Neck:      Thyroid: No thyromegaly.      Vascular: No carotid bruit.   Cardiovascular:      Rate and Rhythm: Normal rate and regular rhythm.      Heart sounds: Normal heart sounds. No murmur heard.  Pulmonary:      Effort: Pulmonary effort is normal. No respiratory distress.      Breath sounds: Normal breath sounds. No wheezing or rhonchi.   Abdominal:      General: There is no abdominal bruit.   Musculoskeletal:      Cervical back: Neck supple.   Skin:     General: Skin is warm.   Neurological:      General: No focal deficit present.      Mental Status: She is alert and oriented to person, place, and time.      Cranial Nerves: No cranial nerve deficit.   Psychiatric:         Mood and Affect: Mood normal.         Behavior: Behavior normal.

## 2025-02-05 NOTE — ASSESSMENT & PLAN NOTE
Atorvastatin 40 mg daily.  Orders:  •  CBC and differential; Future  •  Comprehensive metabolic panel; Future  •  Lipid Panel with Direct LDL reflex; Future

## 2025-02-05 NOTE — ASSESSMENT & PLAN NOTE
Patient uses Vicodin as needed for chronic severe arthritic, neck and back pain.  No history of misuse

## 2025-02-05 NOTE — ASSESSMENT & PLAN NOTE
Patient remains under care of cardiology at [facility].  Continue nebivolol

## 2025-02-05 NOTE — ASSESSMENT & PLAN NOTE
EGD in March 2024 in May 2024.  2 gastric polyps were removed.  She was diagnosed with atrophic gastritis.  Follow-up with GI PRN, advised to continue B12 injections  Orders:  •  omeprazole (PriLOSEC) 40 MG capsule; Take 1 capsule (40 mg total) by mouth daily

## 2025-02-06 LAB
ALBUMIN SERPL BCG-MCNC: 4.9 G/DL (ref 3.5–5)
ALP SERPL-CCNC: 96 U/L (ref 34–104)
ALT SERPL W P-5'-P-CCNC: 26 U/L (ref 7–52)
ANION GAP SERPL CALCULATED.3IONS-SCNC: 11 MMOL/L (ref 4–13)
AST SERPL W P-5'-P-CCNC: 27 U/L (ref 13–39)
BILIRUB SERPL-MCNC: 0.62 MG/DL (ref 0.2–1)
BUN SERPL-MCNC: 21 MG/DL (ref 5–25)
CALCIUM SERPL-MCNC: 9.8 MG/DL (ref 8.4–10.2)
CHLORIDE SERPL-SCNC: 104 MMOL/L (ref 96–108)
CHOLEST SERPL-MCNC: 172 MG/DL (ref ?–200)
CO2 SERPL-SCNC: 29 MMOL/L (ref 21–32)
CREAT SERPL-MCNC: 0.68 MG/DL (ref 0.6–1.3)
GFR SERPL CREATININE-BSD FRML MDRD: 88 ML/MIN/1.73SQ M
GLUCOSE P FAST SERPL-MCNC: 100 MG/DL (ref 65–99)
HDLC SERPL-MCNC: 57 MG/DL
LDLC SERPL CALC-MCNC: 86 MG/DL (ref 0–100)
POTASSIUM SERPL-SCNC: 4.5 MMOL/L (ref 3.5–5.3)
PROT SERPL-MCNC: 7.8 G/DL (ref 6.4–8.4)
SODIUM SERPL-SCNC: 144 MMOL/L (ref 135–147)
TRIGL SERPL-MCNC: 144 MG/DL (ref ?–150)

## 2025-02-07 DIAGNOSIS — E03.9 HYPOTHYROIDISM, UNSPECIFIED TYPE: ICD-10-CM

## 2025-02-07 RX ORDER — LEVOTHYROXINE SODIUM 112 UG/1
112 TABLET ORAL DAILY
Qty: 90 TABLET | Refills: 1 | Status: SHIPPED | OUTPATIENT
Start: 2025-02-07

## 2025-02-07 NOTE — TELEPHONE ENCOUNTER
Patient called stating she saw this medication was recalled and threw out her entire bottle. She is now realizing it could've been a different strength that was recalled and wants a new script sent to her pharmacy      Medication: levothyroxine 112 mcg tablet     Dose/Frequency:   TAKE 1 TABLET BY MOUTH EVERY DAY    Quantity: 90    Pharmacy: [facility]/pharmacy #[id number] - [place] - [address] [address]     Office:   [x] PCP/Provider -   [] Speciality/Provider -     Does the patient have enough for 3 days?   [] Yes   [x] No - Send as HP to POD

## 2025-02-09 ENCOUNTER — RESULTS FOLLOW-UP (OUTPATIENT)
Dept: FAMILY MEDICINE CLINIC | Facility: CLINIC | Age: 70
End: 2025-02-09

## 2025-02-09 PROBLEM — R06.83 SNORING: Status: RESOLVED | Noted: 2023-01-05 | Resolved: 2025-02-09

## 2025-02-16 DIAGNOSIS — I47.10 PSVT (PAROXYSMAL SUPRAVENTRICULAR TACHYCARDIA) (HCC): ICD-10-CM

## 2025-02-16 DIAGNOSIS — I10 BENIGN ESSENTIAL HYPERTENSION: ICD-10-CM

## 2025-02-16 RX ORDER — NEBIVOLOL 10 MG/1
10 TABLET ORAL DAILY
Qty: 90 TABLET | Refills: 1 | Status: SHIPPED | OUTPATIENT
Start: 2025-02-16

## 2025-02-17 ENCOUNTER — CLINICAL SUPPORT (OUTPATIENT)
Dept: FAMILY MEDICINE CLINIC | Facility: CLINIC | Age: 70
End: 2025-02-17
Payer: MEDICARE

## 2025-02-17 DIAGNOSIS — E53.8 VITAMIN B 12 DEFICIENCY: Primary | Chronic | ICD-10-CM

## 2025-02-17 PROCEDURE — 96372 THER/PROPH/DIAG INJ SC/IM: CPT

## 2025-02-17 RX ADMIN — CYANOCOBALAMIN 1000 MCG: 1000 INJECTION, SOLUTION INTRAMUSCULAR; SUBCUTANEOUS at 14:26

## 2025-02-25 RX ADMIN — CYANOCOBALAMIN 1000 MCG: 1000 INJECTION, SOLUTION INTRAMUSCULAR; SUBCUTANEOUS at 17:13

## 2025-02-26 DIAGNOSIS — E03.9 HYPOTHYROIDISM, UNSPECIFIED TYPE: ICD-10-CM

## 2025-02-26 RX ORDER — LEVOTHYROXINE SODIUM 112 UG/1
112 TABLET ORAL DAILY
Qty: 90 TABLET | Refills: 1 | Status: SHIPPED | OUTPATIENT
Start: 2025-02-26

## 2025-03-13 ENCOUNTER — TELEPHONE (OUTPATIENT)
Dept: GASTROENTEROLOGY | Facility: CLINIC | Age: 70
End: 2025-03-13

## 2025-03-30 DIAGNOSIS — F41.1 GAD (GENERALIZED ANXIETY DISORDER): ICD-10-CM

## 2025-03-31 RX ORDER — SERTRALINE HYDROCHLORIDE 100 MG/1
200 TABLET, FILM COATED ORAL DAILY
Qty: 180 TABLET | Refills: 1 | Status: SHIPPED | OUTPATIENT
Start: 2025-03-31

## 2025-04-04 ENCOUNTER — TELEPHONE (OUTPATIENT)
Age: 70
End: 2025-04-04

## 2025-04-04 NOTE — TELEPHONE ENCOUNTER
Pt also called to get refill.  Only has one left.    sertraline (ZOLOFT) 100 mg tablet TAKE 2 TABLETS BY MOUTH EVERY DAY

## 2025-04-07 ENCOUNTER — CLINICAL SUPPORT (OUTPATIENT)
Dept: FAMILY MEDICINE CLINIC | Facility: CLINIC | Age: 70
End: 2025-04-07
Payer: MEDICARE

## 2025-04-07 DIAGNOSIS — E53.8 VITAMIN B 12 DEFICIENCY: Primary | ICD-10-CM

## 2025-04-07 PROCEDURE — 96372 THER/PROPH/DIAG INJ SC/IM: CPT

## 2025-04-07 RX ORDER — CYANOCOBALAMIN 1000 UG/ML
1000 INJECTION, SOLUTION INTRAMUSCULAR; SUBCUTANEOUS
Status: SHIPPED | OUTPATIENT
Start: 2025-04-07

## 2025-04-07 RX ADMIN — CYANOCOBALAMIN 1000 MCG: 1000 INJECTION, SOLUTION INTRAMUSCULAR; SUBCUTANEOUS at 14:00

## 2025-04-17 ENCOUNTER — TELEPHONE (OUTPATIENT)
Dept: GASTROENTEROLOGY | Facility: CLINIC | Age: 70
End: 2025-04-17

## 2025-04-17 NOTE — TELEPHONE ENCOUNTER
LMOM In review of our records, it shows you are due for the following: One year follow up  Please call the office to schedule your appointment [phone].

## 2025-05-07 DIAGNOSIS — K21.9 GASTROESOPHAGEAL REFLUX DISEASE WITHOUT ESOPHAGITIS: ICD-10-CM

## 2025-05-07 RX ORDER — OMEPRAZOLE 40 MG/1
40 CAPSULE, DELAYED RELEASE ORAL DAILY
Qty: 90 CAPSULE | Refills: 4 | OUTPATIENT
Start: 2025-05-07

## 2025-05-08 ENCOUNTER — CLINICAL SUPPORT (OUTPATIENT)
Dept: FAMILY MEDICINE CLINIC | Facility: CLINIC | Age: 70
End: 2025-05-08
Payer: MEDICARE

## 2025-05-08 DIAGNOSIS — M51.9 DISC DISORDER OF LUMBAR REGION: Primary | ICD-10-CM

## 2025-05-08 DIAGNOSIS — E53.8 VITAMIN B 12 DEFICIENCY: ICD-10-CM

## 2025-05-08 PROCEDURE — 96372 THER/PROPH/DIAG INJ SC/IM: CPT | Performed by: FAMILY MEDICINE

## 2025-05-08 RX ORDER — KETOROLAC TROMETHAMINE 30 MG/ML
30 INJECTION, SOLUTION INTRAMUSCULAR; INTRAVENOUS ONCE
Status: COMPLETED | OUTPATIENT
Start: 2025-05-08 | End: 2025-05-08

## 2025-05-08 RX ORDER — CYANOCOBALAMIN 1000 UG/ML
1000 INJECTION, SOLUTION INTRAMUSCULAR; SUBCUTANEOUS ONCE
Status: COMPLETED | OUTPATIENT
Start: 2025-05-08 | End: 2025-05-08

## 2025-05-08 RX ADMIN — KETOROLAC TROMETHAMINE 30 MG: 30 INJECTION, SOLUTION INTRAMUSCULAR; INTRAVENOUS at 14:00

## 2025-05-08 RX ADMIN — CYANOCOBALAMIN 1000 MCG: 1000 INJECTION, SOLUTION INTRAMUSCULAR; SUBCUTANEOUS at 02:00

## 2025-05-20 DIAGNOSIS — I10 BENIGN ESSENTIAL HYPERTENSION: ICD-10-CM

## 2025-05-20 RX ORDER — AMLODIPINE BESYLATE 5 MG/1
5 TABLET ORAL DAILY
Qty: 90 TABLET | Refills: 1 | Status: SHIPPED | OUTPATIENT
Start: 2025-05-20

## 2025-05-21 DIAGNOSIS — M51.9 DISC DISORDER OF LUMBAR REGION: ICD-10-CM

## 2025-05-21 DIAGNOSIS — M13.0 POLYARTICULAR ARTHRITIS: ICD-10-CM

## 2025-05-21 RX ORDER — HYDROCODONE BITARTRATE AND ACETAMINOPHEN 5; 325 MG/1; MG/1
1 TABLET ORAL 2 TIMES DAILY PRN
Qty: 60 TABLET | Refills: 0 | Status: SHIPPED | OUTPATIENT
Start: 2025-05-21

## 2025-05-21 NOTE — TELEPHONE ENCOUNTER
Medication: HYDROcodone-acetaminophen (Norco) 5-325 mg per tablet     Dose/Frequency: Take 1 tablet by mouth 2 (two) times a day as needed (low back pain) Max Daily Amount: 2 tablets     Quantity: 60 tablet     Pharmacy: [facility]/pharmacy #[id number] - [place] - [address] [address]     Office:   [x] PCP/Provider -   [] Speciality/Provider -     Does the patient have enough for 3 days?   [] Yes   [x] No - Send as HP to POD

## 2025-06-09 ENCOUNTER — CLINICAL SUPPORT (OUTPATIENT)
Dept: FAMILY MEDICINE CLINIC | Facility: CLINIC | Age: 70
End: 2025-06-09
Payer: MEDICARE

## 2025-06-09 DIAGNOSIS — E53.8 VITAMIN B 12 DEFICIENCY: Primary | Chronic | ICD-10-CM

## 2025-06-09 PROCEDURE — 96372 THER/PROPH/DIAG INJ SC/IM: CPT

## 2025-06-09 RX ORDER — CYANOCOBALAMIN 1000 UG/ML
1000 INJECTION, SOLUTION INTRAMUSCULAR; SUBCUTANEOUS ONCE
Status: COMPLETED | OUTPATIENT
Start: 2025-06-09 | End: 2025-06-09

## 2025-06-09 RX ADMIN — CYANOCOBALAMIN 1000 MCG: 1000 INJECTION, SOLUTION INTRAMUSCULAR; SUBCUTANEOUS at 11:13

## 2025-06-09 RX ADMIN — CYANOCOBALAMIN 1000 MCG: 1000 INJECTION, SOLUTION INTRAMUSCULAR; SUBCUTANEOUS at 11:08

## 2025-06-11 NOTE — PROGRESS NOTES
Name: Yahaira Galloway      : 1955      MRN: [id number]  Encounter Provider: Lena Cristina PA-C  Encounter Date: 2025   Encounter department: [facility] [place]  :  Assessment & Plan  Atrophic gastritis without hemorrhage  Patient has been getting monthly B12 injections. last EGD was in may of 2024 that noted benign gastric polyps; EGD done 2 months prior to that in march noted chronic atrophic gastritis but no signs of metaplasia or dysplasia.   -repeat EGD        Gastric polyp   last EGD last May noted 2 gastric polyps that were benign on pathology.    Hiatal hernia with GERD  EGD last year noted 5 cm hiatal hernia.        Gastroesophageal reflux disease without esophagitis  She says that she did run out of her omeprazole.  She says that she feels this was helping as there has been times since then where she feels some reflux however she says this usually occurs when she is bending over after eating.  -Please stay upright for at least 2 hours after eating  -Restart omeprazole 40 mg daily.  Orders:    omeprazole (PriLOSEC) 40 MG capsule; Take 1 capsule (40 mg total) by mouth daily 30 minutes before breakfast or dinner    Irritable bowel syndrome with diarrhea  She says that she has actually been taking Bentyl once a day and since doing that she has not had any further abdominal cramping and also has noticed her diarrhea has lessened.  She said she used to have issues with incontinence of feces every once in a while but this also stopped since being on the Bentyl once a day.    -Continue Bentyl 20 mg once daily; Can be taken up to 4 times a day as needed for abdominal pain  Orders:    dicyclomine (BENTYL) 20 mg tablet; Take 1 tablet (20 mg total) by mouth every 6 (six) hours As needed for abdominal pain    Screening for colon cancer  Pt would like cologuard reordered as she did not complete the one form last year.,   Orders:    Cologuard        History of Present  Illness   HPI  Yahaira Galloway is a 70 y.o. female who presents for follow-up. She says that she did run out of her omeprazole.  She says that she feels this was helping as there has been times since then where she feels some reflux however she says this usually occurs when she is bending over after eating.She says that she has actually been taking Bentyl once a day and since doing that she has not had any further abdominal cramping and also has noticed her diarrhea has lessened.  She said she used to have issues with incontinence of feces every once in a while but this also stopped since being on the Bentyl once a day.  She otherwise denies nausea or vomiting, trouble swallowing, changes in bowel habits, fevers, chills, bloody or black bowel movements.  She denies family history of colon cancer.  History obtained from: patient    Review of Systems   Constitutional:  Negative for chills, fever and unexpected weight change.   HENT:  Negative for trouble swallowing.    Respiratory:  Positive for cough.    Gastrointestinal:  Positive for diarrhea. Negative for abdominal distention, abdominal pain, anal bleeding, blood in stool, constipation, nausea, rectal pain and vomiting.   All other systems reviewed and are negative.    Medical History Reviewed by provider this encounter:     .  Past Medical History   Past Medical History[1]  Past Surgical History[2]  Family History[3]   reports that she has never smoked. She has been exposed to tobacco smoke. She has never used smokeless tobacco. She reports current alcohol use. She reports that she does not use drugs.  Current Outpatient Medications   Medication Instructions    amLODIPine (NORVASC) 5 mg, Oral, Daily    atorvastatin (LIPITOR) 40 mg, Oral, Daily    Calcium Carbonate-Vitamin D (CALCIUM CREAMIES PO) 600 mg, Daily    Cholecalciferol 2,000 Units    clonazePAM (KlonoPIN) 1 mg tablet Take half a tablet twice a day as needed for anxiety    dicyclomine (BENTYL) 20 mg, Oral,  Every 6 hours, As needed for abdominal pain    ferrous sulfate 325 (65 Fe) mg tablet Per patient once/twice weekly    fluocinonide (LIDEX) 0.05 % cream 1 Application, Topical, 2 times daily, To affected area    HYDROcodone-acetaminophen (Norco) 5-325 mg per tablet 1 tablet, Oral, 2 times daily PRN    levothyroxine 112 mcg, Oral, Daily    Lidocaine (HM Lidocaine Patch) 4 % PTCH Apply topically    methylPREDNISolone 4 MG tablet therapy pack Use as directed on package    naloxone (NARCAN) 4 mg/0.1 mL nasal spray Administer 1 spray into a nostril. If no response after 2-3 minutes, give another dose in the other nostril using a new spray.    nebivolol (BYSTOLIC) 10 mg, Oral, Daily    omeprazole (PRILOSEC) 40 mg, Oral, Daily, 30 minutes before breakfast or dinner    sertraline (ZOLOFT) 200 mg, Oral, Daily    tiZANidine (ZANAFLEX) 4 mg, Oral, Daily at bedtime PRN   Allergies[4]   Medications Ordered Prior to Encounter[5]   Social History[6]     Objective   There were no vitals taken for this visit.     Physical Exam    Administrative Statements   I have spent a total time of 30 minutes in caring for this patient on the day of the visit/encounter including Diagnostic results, Prognosis, Risks and benefits of tx options, Instructions for management, Patient and family education, Importance of tx compliance, Risk factor reductions, Impressions, Counseling / Coordination of care, Documenting in the medical record, Reviewing/placing orders in the medical record (including tests, medications, and/or procedures), Obtaining or reviewing history  , and Communicating with other healthcare professionals .       [1]   Past Medical History:  Diagnosis Date    Anemia     Iron and B-12 anemia    Anxiety and depression     Bleeding ulcer     Disease of thyroid gland     Eczema     GERD (gastroesophageal reflux disease)     Groin pain, right 02/03/2021    Sacroiliitis, not elsewhere classified (HCC)     Scoliosis     Skin lesion 01/05/2024     Spinal meningioma (HCC)    [2]   Past Surgical History:  Procedure Laterality Date    SPINAL CORD STIMULATOR IMPLANT      SPINE SURGERY  1996     Bibiana pepper, [facility]   [3]   Family History  Problem Relation Name Age of Onset    Heart disease Mother      Skin cancer Mother          unsure of type    Esophageal cancer Father      Kidney cancer Father      Thyroid disease Father      Diabetes Son      Skin cancer Sister          unsure of type    Cervical cancer Maternal Grandmother      No Known Problems Maternal Grandfather      No Known Problems Paternal Grandmother      No Known Problems Paternal Grandfather      No Known Problems Sister      Skin cancer Maternal Aunt          unsure of type    No Known Problems Paternal Aunt      No Known Problems Paternal Aunt     [4]   Allergies  Allergen Reactions    Cephalosporins Other (See Comments)     GI Upset (vomit/diarrhea)    Medical Tape Rash    Metformin Vomiting     Other reaction(s): Nausea and/or vomiting    Nabumetone GI Intolerance    Other GI Intolerance and Other (See Comments)    Celecoxib Other (See Comments)    Cephalexin     Choline Magnesium Trisalicylate     Diclofenac Other (See Comments)    Diclofenac Potassium(Migraine) GI Intolerance    Diclofenac Sodium GI Intolerance    Fentanyl Other (See Comments)    Propoxyphene Other (See Comments)     unknown    Sulfa Antibiotics     Sulfacetamide     Zolpidem Other (See Comments)   [5]   Current Outpatient Medications on File Prior to Visit   Medication Sig Dispense Refill    amLODIPine (NORVASC) 5 mg tablet TAKE 1 TABLET (5 MG TOTAL) BY MOUTH DAILY. 90 tablet 1    atorvastatin (LIPITOR) 40 mg tablet Take 1 tablet (40 mg total) by mouth daily 100 tablet 3    Calcium Carbonate-Vitamin D (CALCIUM CREAMIES PO) Take 600 mg by mouth in the morning.      Cholecalciferol 25 MCG (1000 UT) capsule Take 2,000 Units by mouth      clonazePAM (KlonoPIN) 1 mg tablet Take half a tablet twice a day as needed  for anxiety 30 tablet 5    ferrous sulfate 325 (65 Fe) mg tablet Per patient once/twice weekly      HYDROcodone-acetaminophen (Norco) 5-325 mg per tablet Take 1 tablet by mouth 2 (two) times a day as needed (low back pain) Max Daily Amount: 2 tablets 60 tablet 0    levothyroxine 112 mcg tablet Take 1 tablet (112 mcg total) by mouth daily 90 tablet 1    Lidocaine (HM Lidocaine Patch) 4 % PTCH Apply topically      methylPREDNISolone 4 MG tablet therapy pack Use as directed on package 21 each 0    naloxone (NARCAN) 4 mg/0.1 mL nasal spray Administer 1 spray into a nostril. If no response after 2-3 minutes, give another dose in the other nostril using a new spray. 1 each 1    nebivolol (BYSTOLIC) 10 mg tablet TAKE 1 TABLET BY MOUTH EVERY DAY 90 tablet 1    sertraline (ZOLOFT) 100 mg tablet TAKE 2 TABLETS BY MOUTH EVERY  tablet 1    tiZANidine (ZANAFLEX) 4 mg tablet TAKE 1 TABLET (4 MG TOTAL) BY MOUTH DAILY AT BEDTIME AS NEEDED FOR MUSCLE SPASMS 30 tablet 5    [DISCONTINUED] dicyclomine (BENTYL) 20 mg tablet TAKE 1 TABLET BY MOUTH 2 TIMES A DAY AS NEEDED (ABDOMINAL BLOATING/PAIN) 180 tablet 2    [DISCONTINUED] omeprazole (PriLOSEC) 40 MG capsule Take 1 capsule (40 mg total) by mouth daily 90 capsule 3    fluocinonide (LIDEX) 0.05 % cream Apply 1 Application topically 2 (two) times a day To affected area (Patient not taking: Reported on 6/12/2025) 60 g 1     Current Facility-Administered Medications on File Prior to Visit   Medication Dose Route Frequency Provider Last Rate Last Admin    cyanocobalamin injection 1,000 mcg  1,000 mcg Intramuscular Q30 Days Florence Barragan MD   1,000 mcg at 02/21/18 1534    cyanocobalamin injection 1,000 mcg  1,000 mcg Intramuscular Q30 Days Florence Barragan MD   1,000 mcg at 03/29/18 0717    cyanocobalamin injection 1,000 mcg  1,000 mcg Intramuscular Q30 Days Florence Barragan MD   1,000 mcg at 04/30/18 1737    cyanocobalamin injection 1,000 mcg  1,000 mcg Intramuscular Q30  Days Jun Canela MD   1,000 mcg at 08/10/18 1017    cyanocobalamin injection 1,000 mcg  1,000 mcg Intramuscular Q30 Days Florence Barragan MD   1,000 mcg at 09/10/18 1127    cyanocobalamin injection 1,000 mcg  1,000 mcg Intramuscular Q30 Days Florence Barragan MD   1,000 mcg at 02/11/19 0958    cyanocobalamin injection 1,000 mcg  1,000 mcg Intramuscular Q30 Days Florence Barragan MD   1,000 mcg at 09/23/19 1739    cyanocobalamin injection 1,000 mcg  1,000 mcg Intramuscular Q30 Days Florence Barragan MD   1,000 mcg at 02/25/25 1713    cyanocobalamin injection 1,000 mcg  1,000 mcg Intramuscular Q30 Days Nataliya Townsend MD   1,000 mcg at 12/06/19 1513    cyanocobalamin injection 1,000 mcg  1,000 mcg Intramuscular Q30 Days Florence Barragan MD   1,000 mcg at 07/02/20 1412    cyanocobalamin injection 1,000 mcg  1,000 mcg Intramuscular Q30 Days Florence Barragan MD   1,000 mcg at 06/09/25 1108    cyanocobalamin injection 1,000 mcg  1,000 mcg Intramuscular Q30 Days Florence Barragan MD   1,000 mcg at 02/17/25 1426    cyanocobalamin injection 1,000 mcg  1,000 mcg Intramuscular Q30 Days    1,000 mcg at 04/07/25 1400   [6]   Social History  Tobacco Use    Smoking status: Never     Passive exposure: Past    Smokeless tobacco: Never   Vaping Use    Vaping status: Never Used   Substance and Sexual Activity    Alcohol use: Yes     Comment: rare    Drug use: No    Sexual activity: Not Currently     Partners: Male

## 2025-06-12 ENCOUNTER — TELEPHONE (OUTPATIENT)
Age: 70
End: 2025-06-12

## 2025-06-12 ENCOUNTER — OFFICE VISIT (OUTPATIENT)
Dept: GASTROENTEROLOGY | Facility: CLINIC | Age: 70
End: 2025-06-12
Payer: MEDICARE

## 2025-06-12 VITALS
BODY MASS INDEX: 27.48 KG/M2 | HEIGHT: 60 IN | WEIGHT: 140 LBS | DIASTOLIC BLOOD PRESSURE: 74 MMHG | SYSTOLIC BLOOD PRESSURE: 114 MMHG | TEMPERATURE: 98.2 F

## 2025-06-12 DIAGNOSIS — K29.40 ATROPHIC GASTRITIS WITHOUT HEMORRHAGE: Primary | ICD-10-CM

## 2025-06-12 DIAGNOSIS — K58.0 IRRITABLE BOWEL SYNDROME WITH DIARRHEA: ICD-10-CM

## 2025-06-12 DIAGNOSIS — K21.9 GASTROESOPHAGEAL REFLUX DISEASE WITHOUT ESOPHAGITIS: ICD-10-CM

## 2025-06-12 DIAGNOSIS — Z12.11 SCREENING FOR COLON CANCER: ICD-10-CM

## 2025-06-12 DIAGNOSIS — K21.9 HIATAL HERNIA WITH GERD: ICD-10-CM

## 2025-06-12 DIAGNOSIS — K31.7 GASTRIC POLYP: ICD-10-CM

## 2025-06-12 DIAGNOSIS — K44.9 HIATAL HERNIA WITH GERD: ICD-10-CM

## 2025-06-12 PROCEDURE — 99214 OFFICE O/P EST MOD 30 MIN: CPT | Performed by: PHYSICIAN ASSISTANT

## 2025-06-12 RX ORDER — DICYCLOMINE HCL 20 MG
20 TABLET ORAL EVERY 6 HOURS
Qty: 180 TABLET | Refills: 2 | Status: SHIPPED | OUTPATIENT
Start: 2025-06-12

## 2025-06-12 RX ORDER — DICYCLOMINE HCL 20 MG
20 TABLET ORAL EVERY 6 HOURS
Qty: 180 TABLET | Refills: 2 | Status: SHIPPED | OUTPATIENT
Start: 2025-06-12 | End: 2025-06-12

## 2025-06-12 RX ORDER — OMEPRAZOLE 40 MG/1
40 CAPSULE, DELAYED RELEASE ORAL DAILY
Qty: 90 CAPSULE | Refills: 3 | Status: SHIPPED | OUTPATIENT
Start: 2025-06-12

## 2025-06-12 RX ORDER — DICYCLOMINE HCL 20 MG
20 TABLET ORAL EVERY 6 HOURS
Qty: 180 TABLET | Refills: 2 | Status: SHIPPED | OUTPATIENT
Start: 2025-06-12 | End: 2025-06-12 | Stop reason: SDUPTHER

## 2025-06-12 NOTE — ASSESSMENT & PLAN NOTE
She says that she did run out of her omeprazole.  She says that she feels this was helping as there has been times since then where she feels some reflux however she says this usually occurs when she is bending over after eating.  -Please stay upright for at least 2 hours after eating  -Restart omeprazole 40 mg daily.  Orders:    omeprazole (PriLOSEC) 40 MG capsule; Take 1 capsule (40 mg total) by mouth daily 30 minutes before breakfast or dinner

## 2025-06-12 NOTE — ASSESSMENT & PLAN NOTE
She says that she has actually been taking Bentyl once a day and since doing that she has not had any further abdominal cramping and also has noticed her diarrhea has lessened.  She said she used to have issues with incontinence of feces every once in a while but this also stopped since being on the Bentyl once a day.    -Continue Bentyl 20 mg once daily; Can be taken up to 4 times a day as needed for abdominal pain  Orders:    dicyclomine (BENTYL) 20 mg tablet; Take 1 tablet (20 mg total) by mouth every 6 (six) hours As needed for abdominal pain

## 2025-06-12 NOTE — TELEPHONE ENCOUNTER
Bentyl E-Prescribing Status: Transmission to pharmacy failed (6/12/2025  1:30 PM EDT)     Rx resent per [facility] refill protocol. Receipt confirmed by pharmacy (6/12/2025  1:46 PM EDT)

## 2025-07-09 ENCOUNTER — CLINICAL SUPPORT (OUTPATIENT)
Dept: FAMILY MEDICINE CLINIC | Facility: CLINIC | Age: 70
End: 2025-07-09
Payer: MEDICARE

## 2025-07-09 DIAGNOSIS — E53.8 VITAMIN B 12 DEFICIENCY: Primary | Chronic | ICD-10-CM

## 2025-07-09 PROCEDURE — 96372 THER/PROPH/DIAG INJ SC/IM: CPT

## 2025-07-09 RX ADMIN — CYANOCOBALAMIN 1000 MCG: 1000 INJECTION, SOLUTION INTRAMUSCULAR; SUBCUTANEOUS at 13:47

## 2025-07-28 DIAGNOSIS — L30.9 ECZEMA OF BOTH HANDS: ICD-10-CM

## 2025-07-28 RX ORDER — FLUOCINONIDE 0.5 MG/G
1 CREAM TOPICAL 2 TIMES DAILY
Qty: 60 G | Refills: 1 | Status: SHIPPED | OUTPATIENT
Start: 2025-07-28

## 2025-08-07 ENCOUNTER — HOSPITAL ENCOUNTER (OUTPATIENT)
Dept: MAMMOGRAPHY | Facility: CLINIC | Age: 70
Discharge: HOME | End: 2025-08-07
Attending: FAMILY MEDICINE
Payer: MEDICARE

## 2025-08-07 DIAGNOSIS — Z12.31 SCREENING MAMMOGRAM FOR BREAST CANCER: ICD-10-CM

## 2025-08-07 DIAGNOSIS — R92.1 BREAST CALCIFICATIONS ON MAMMOGRAM: ICD-10-CM

## 2025-08-07 PROCEDURE — G0279 TOMOSYNTHESIS, MAMMO: HCPCS

## 2025-08-07 PROCEDURE — 77066 DX MAMMO INCL CAD BI: CPT

## 2025-08-11 ENCOUNTER — CLINICAL SUPPORT (OUTPATIENT)
Dept: FAMILY MEDICINE CLINIC | Facility: CLINIC | Age: 70
End: 2025-08-11
Payer: MEDICARE

## 2025-08-19 DIAGNOSIS — I10 BENIGN ESSENTIAL HYPERTENSION: ICD-10-CM

## 2025-08-19 DIAGNOSIS — E78.2 MIXED HYPERLIPIDEMIA: ICD-10-CM

## 2025-08-19 DIAGNOSIS — I47.10 PSVT (PAROXYSMAL SUPRAVENTRICULAR TACHYCARDIA) (HCC): ICD-10-CM

## 2025-08-20 RX ORDER — ATORVASTATIN CALCIUM 40 MG/1
40 TABLET, FILM COATED ORAL DAILY
Qty: 100 TABLET | Refills: 1 | Status: SHIPPED | OUTPATIENT
Start: 2025-08-20

## 2025-08-20 RX ORDER — NEBIVOLOL 10 MG/1
10 TABLET ORAL DAILY
Qty: 90 TABLET | Refills: 0 | Status: SHIPPED | OUTPATIENT
Start: 2025-08-20

## 2025-08-21 DIAGNOSIS — M13.0 POLYARTICULAR ARTHRITIS: ICD-10-CM

## 2025-08-21 DIAGNOSIS — M51.9 DISC DISORDER OF LUMBAR REGION: ICD-10-CM

## 2025-08-21 RX ORDER — HYDROCODONE BITARTRATE AND ACETAMINOPHEN 5; 325 MG/1; MG/1
1 TABLET ORAL 2 TIMES DAILY PRN
Qty: 60 TABLET | Refills: 0 | Status: SHIPPED | OUTPATIENT
Start: 2025-08-21

## 2025-08-22 ENCOUNTER — TELEPHONE (OUTPATIENT)
Dept: FAMILY MEDICINE CLINIC | Facility: CLINIC | Age: 70
End: 2025-08-22

## 2025-08-22 DIAGNOSIS — M54.17 LUMBOSACRAL RADICULITIS: Primary | ICD-10-CM

## 2025-08-22 RX ORDER — METHYLPREDNISOLONE 4 MG/1
TABLET ORAL
Qty: 21 EACH | Refills: 0 | Status: SHIPPED | OUTPATIENT
Start: 2025-08-22